# Patient Record
Sex: FEMALE | Race: WHITE | NOT HISPANIC OR LATINO | Employment: OTHER | URBAN - METROPOLITAN AREA
[De-identification: names, ages, dates, MRNs, and addresses within clinical notes are randomized per-mention and may not be internally consistent; named-entity substitution may affect disease eponyms.]

---

## 2017-01-10 ENCOUNTER — HOSPITAL ENCOUNTER (OUTPATIENT)
Dept: RADIOLOGY | Facility: MEDICAL CENTER | Age: 62
End: 2017-01-10
Attending: FAMILY MEDICINE
Payer: COMMERCIAL

## 2017-01-10 DIAGNOSIS — R92.8 ABNORMAL MAMMOGRAM OF LEFT BREAST: ICD-10-CM

## 2017-01-10 PROCEDURE — G0206 DX MAMMO INCL CAD UNI: HCPCS | Mod: LT

## 2017-01-11 ENCOUNTER — OFFICE VISIT (OUTPATIENT)
Dept: OTHER | Facility: IMAGING CENTER | Age: 62
End: 2017-01-11
Payer: COMMERCIAL

## 2017-01-11 DIAGNOSIS — G47.00 INSOMNIA, UNSPECIFIED TYPE: ICD-10-CM

## 2017-01-11 DIAGNOSIS — M54.31 RIGHT SIDED SCIATICA: ICD-10-CM

## 2017-01-11 PROCEDURE — 97811 ACUP 1/> W/O ESTIM EA ADD 15: CPT | Performed by: FAMILY MEDICINE

## 2017-01-11 PROCEDURE — 97813 ACUP 1/> W/ESTIM 1ST 15 MIN: CPT | Performed by: FAMILY MEDICINE

## 2017-01-11 PROCEDURE — 99213 OFFICE O/P EST LOW 20 MIN: CPT | Mod: 25 | Performed by: FAMILY MEDICINE

## 2017-01-11 NOTE — PROGRESS NOTES
Subjective:      Adrianne Martinez is a 60 y.o. female who presents with No chief complaint on file.        [facility] Medical Acupuncture Progress Note  [address] [place] [address]  Dept: [phone]      Patient Name: Adrianne Martinez   MRN: [id number]  YOB: 1955  PCP: Cyrus Mcgowan M.D.  Date of Service: 1/11/2017  9:26 AM    CC Adrianne - Right IT band and hip pain.   Bilateral R>L Cervicalgia (improved)  Insomnia.       HPI L sided breast biopsy is pending for a mass.  She has had a biopsy of the region in the past.  R hip -posterior lateral hip painful    Insomnia.    Has been travelling recently, and this has been tiring to her.   She's seen Dr. Kirk the neurologist recently for her falling / brief LOC at the end of the summer.  No significant diagnoses.           ROS Birthplace: [place], [place], TIme Unknown - ? Born in the early evening per family  Color: Blue  Pain Management - [facility]  Season:Spring and fall for the activities she can do.  Right-handed  Scars: Left breast, L elbow  Has a dog - emotional support dog.  2 dogs.    She had a large weight loss  Doesn't like water  Sees Dr. Monet   [facility] Past Medical History   Diagnosis Date   • Postmenopausal HRT (hormone replacement therapy)    • Insomnia    • Chronic pelvic pain in female    • ENDOMETRIOSIS    • Recurrent UTI    • Family history of colon cancer      Past Surgical History   Procedure Laterality Date   • Abdominal hysterectomy total  1999     for endometriosis   • Lumpectomy  2007     Left - benign   • Other orthopedic surgery  1964     ORIF L elbow compound fx      SH Social History     Social History   • Marital Status:      Spouse Name: N/A   • Number of Children: 2   • Years of Education: N/A     Social History Main Topics   • Smoking status: Never Smoker    • Smokeless tobacco: Never Used   • Alcohol Use: 1.5 oz/week     3 Glasses of wine per week   • Drug Use: No   • Sexual Activity:     Partners: Male      Other Topics Concern   • Not on file     Social History Narrative    Was previously an  - didn't like her job - didn't like conflict.        MEDS Current Outpatient Prescriptions on File Prior to Visit   Medication Sig Dispense Refill   • zolpidem (AMBIEN) 10 MG Tab TAKE 1 TABLET BY MOUTH AT BEDTIME 15 Tab 2   • tizanidine (ZANAFLEX) 4 MG Tab Take 4 mg by mouth at bedtime as needed.     • duloxetine (CYMBALTA) 30 MG Cap DR Particles Take 20 mg by mouth every day.  0   • pregabalin (LYRICA) 50 MG capsule Take 1 Cap by mouth 2 times a day. (Patient taking differently: Take 25 mg by mouth every day.) 90 Cap 11   • hydrocodone-acetaminophen (NORCO) 5-325 MG TABS per tablet Take 1-2 Tabs by mouth every four hours as needed. Indications: Moderate to Moderately Severe Pain     • esterified estrogens-methyltest (ESTRATEST HS) 0.625-1.25 MG TABS Take 1 Tab by mouth every day.       No current facility-administered medications on file prior to visit.      ALLERGIES Allergies   Allergen Reactions   • Contrast Media With Iodine [Iodine]      Malaise, weakness, inability to get up off table, mental status changes   • Cherry Rash   • Oxycontin [Oxycodone Hcl] Vomiting     Able to take vicodin.   • Demerol Vomiting   • Nsaids Rash      PE Tongue and pulse not examined today       Assessment Eastern [facility] - Yang Wood ([place]), ?surrender case  Stagnation Qi to BL/GB, Nasrin sifuentes axis    Western No diagnosis found.               Plan Set 1: RLE Bl59-->+++Bl40 / GB39-->+++GB34 (non-alphastim)  Set 2: Ear Jiménez Men, Yin Parson, GV20, Flori Jiménez [place]  Set 3: Nasrin Sifuentes 3:6  Set 4: LLE SP6-->SP9     15 min face to face time, not including procedure.     David Vieyra MD

## 2017-01-11 NOTE — MR AVS SNAPSHOT
Adrianne Martinez   2017 9:30 AM   Office Visit   MRN: [id number]    Department:  Acupuncture Med   Dept Phone:  [phone]    Description:  Female : 1955   Provider:  David Vieyra M.D.           Allergies as of 2017     Allergen Noted Reactions    Contrast Media With Iodine [Iodine] 2013       Malaise, weakness, inability to get up off table, mental status changes    Cherry 10/29/2013   Rash    Oxycontin [Oxycodone Hcl] 2014   Vomiting    Able to take vicodin.    Demerol 2010   Vomiting    Nsaids 2013   Rash      Vital Signs     Smoking Status                   Never Smoker            Basic Information     Date Of Birth Sex Race Ethnicity Preferred Language    1955 Female  Non- English      Your appointments     2017  1:00 PM   STEREOBIOPSY with [facility] BX 1   [facility] ([address])    [address] [place] [address] [address]  [facility] [id number] [phone]              Problem List              ICD-10-CM Priority Class Noted - Resolved    Postmenopausal HRT (hormone replacement therapy) Z79.890   Unknown - Present    Insomnia G47.00   Unknown - Present    Chronic pelvic pain in female R10.2, G89.29   Unknown - Present    ENDOMETRIOSIS    Unknown - Present    Recurrent UTI N39.0   Unknown - Present    Stress reaction F43.0   2013 - Present    Chronic pain G89.29   2013 - Present    Weight loss R63.4   2013 - Present    Disturbance in sleep behavior G47.9   2013 - Present    Family history of colon cancer Z80.0   Unknown - Present    Right sided sciatica M54.31   2015 - Present    Craving for particular food R63.8   2015 - Present    Irritable bowel syndrome (IBS) K58.9   2016 - Present    Dermatitis L30.9   2016 - Present    Symptomatic menopausal or female climacteric states N95.1   3/8/2016 - Present    Frozen shoulder M75.00   3/8/2016 - Present    Headache syndrome G44.89   2016 -  Present    History of opthalmic migraine Z86.69   7/12/2016 - Present    Spells R68.89   12/14/2016 - Present    Atherosclerosis of right carotid artery, mild I65.21   12/15/2016 - Present      Health Maintenance        Date Due Completion Dates    MAMMOGRAM 1/10/2018 1/10/2017, 12/12/2016, 4/20/2015, 4/9/2014, 3/6/2012, 9/15/2010, 9/14/2009, 9/14/2009, 8/29/2008, 8/29/2008, 12/22/2006, 12/22/2006, 12/22/2006, 12/6/2006, 11/21/2006, 9/8/2005    COLONOSCOPY 12/23/2021 12/23/2016    IMM DTaP/Tdap/Td Vaccine (2 - Td) 10/29/2023 10/29/2013            Current Immunizations     Hep A/HEP B Combined Vaccine (TwinRix) 2/8/2016    Influenza TIV (IM) 10/29/2013    Influenza Vaccine Quad Inj (Pf) 12/21/2015, 11/12/2014    Influenza Vaccine Quad Inj (Preserved) 10/24/2016    SHINGLES VACCINE 7/31/2015    Tdap Vaccine 10/29/2013      Below and/or attached are the medications your provider expects you to take. Review all of your home medications and newly ordered medications with your provider and/or pharmacist. Follow medication instructions as directed by your provider and/or pharmacist. Please keep your medication list with you and share with your provider. Update the information when medications are discontinued, doses are changed, or new medications (including over-the-counter products) are added; and carry medication information at all times in the event of emergency situations     Allergies:  CONTRAST MEDIA WITH IODINE - (reactions not documented)     CHERRY - Rash     OXYCONTIN - Vomiting     DEMEROL - Vomiting     NSAIDS - Rash               Medications  Valid as of: January 11, 2017 - 10:50 AM    Generic Name Brand Name Tablet Size Instructions for use    DULoxetine HCl (Cap DR Particles) CYMBALTA 30 MG Take 20 mg by mouth every day.        Est Estrogens-Methyltest (Tab) ESTRATEST HS 0.625-1.25 MG Take 1 Tab by mouth every day.        Hydrocodone-Acetaminophen (Tab) NORCO 5-325 MG Take 1-2 Tabs by mouth every four hours  as needed. Indications: Moderate to Moderately Severe Pain        Pregabalin (Cap) LYRICA 50 MG Take 1 Cap by mouth 2 times a day.        TiZANidine HCl (Tab) ZANAFLEX 4 MG Take 4 mg by mouth at bedtime as needed.        Zolpidem Tartrate (Tab) AMBIEN 10 MG TAKE 1 TABLET BY MOUTH AT BEDTIME        .                 Medicines prescribed today were sent to:     [facility][id number] - [place], [place] - [address]    [address] [place] [address]    Phone: [phone] Fax: [phone]    Open 24 Hours?: No    [id number] - [place], [place] - [address] AT [address] [facility] [address]    [address] [place] [address]    Phone: [phone] Fax: [phone]    Open 24 Hours?: No      Medication refill instructions:       If your prescription bottle indicates you have medication refills left, it is not necessary to call your provider’s office. Please contact your pharmacy and they will refill your medication.    If your prescription bottle indicates you do not have any refills left, you may request refills at any time through one of the following ways: The online CertiRx system (except Urgent Care), by calling your provider’s office, or by asking your pharmacy to contact your provider’s office with a refill request. Medication refills are processed only during regular business hours and may not be available until the next business day. Your provider may request additional information or to have a follow-up visit with you prior to refilling your medication.   *Please Note: Medication refills are assigned a new Rx number when refilled electronically. Your pharmacy may indicate that no refills were authorized even though a new prescription for the same medication is available at the pharmacy. Please request the medicine by name with the pharmacy before contacting your provider for a refill.        Other Notes About Your Plan     4/14 Labs [facility] scanned    Tdap 2013  Mammogram- 4/14               Colonoscopy-done 2010 (need every 5 years)        Pap-overdue!           MyChart Access Code: Activation code not generated  Current [facility] Status: Active

## 2017-01-11 NOTE — PATIENT INSTRUCTIONS
The side effects of Acupuncture needle insertion include: minor bruising, bleeding, or pain at the site of needle insertion.  If more worrisome symptoms, such as continued bleeding, severe bruising, or continue pain or altered sensation persist, please contact [facility] office @ [phone]

## 2017-01-14 PROBLEM — D12.6 ADENOMATOUS POLYP OF COLON: Status: ACTIVE | Noted: 2017-01-14

## 2017-01-14 PROBLEM — K57.30 DIVERTICULOSIS OF LARGE INTESTINE WITHOUT HEMORRHAGE: Status: ACTIVE | Noted: 2017-01-14

## 2017-01-17 ENCOUNTER — TELEPHONE (OUTPATIENT)
Dept: RADIOLOGY | Facility: MEDICAL CENTER | Age: 62
End: 2017-01-17

## 2017-01-18 ENCOUNTER — HOSPITAL ENCOUNTER (OUTPATIENT)
Dept: RADIOLOGY | Facility: MEDICAL CENTER | Age: 62
End: 2017-01-18
Attending: FAMILY MEDICINE
Payer: COMMERCIAL

## 2017-01-18 DIAGNOSIS — R92.1 BREAST CALCIFICATION, LEFT: ICD-10-CM

## 2017-01-18 PROCEDURE — 88360 TUMOR IMMUNOHISTOCHEM/MANUAL: CPT | Mod: 91

## 2017-01-18 PROCEDURE — 88305 TISSUE EXAM BY PATHOLOGIST: CPT

## 2017-01-18 PROCEDURE — 19081 BX BREAST 1ST LESION STRTCTC: CPT

## 2017-01-18 PROCEDURE — 88342 IMHCHEM/IMCYTCHM 1ST ANTB: CPT

## 2017-01-19 ENCOUNTER — TELEPHONE (OUTPATIENT)
Dept: RADIOLOGY | Facility: MEDICAL CENTER | Age: 62
End: 2017-01-19

## 2017-01-19 DIAGNOSIS — D05.12 DUCTAL CARCINOMA IN SITU (DCIS) OF LEFT BREAST: ICD-10-CM

## 2017-01-19 DIAGNOSIS — C50.912 INFILTRATING DUCTAL CARCINOMA OF LEFT BREAST (HCC): Primary | ICD-10-CM

## 2017-01-19 NOTE — TELEPHONE ENCOUNTER
Adrianne was contacted by phone to receive her biopsy results, then transferred to the radiologist

## 2017-01-30 ENCOUNTER — TELEPHONE (OUTPATIENT)
Dept: RADIATION ONCOLOGY | Facility: MEDICAL CENTER | Age: 62
End: 2017-01-30

## 2017-01-31 ENCOUNTER — TELEPHONE (OUTPATIENT)
Dept: OTHER | Facility: MEDICAL CENTER | Age: 62
End: 2017-01-31

## 2017-01-31 NOTE — TELEPHONE ENCOUNTER
Referral [facility].  FU call to pt re: POC.  Pt is scheduled to have surgery  On 2/8/17.  She is also scheduled to have a consult with a Radiation Oncologist on 1/31/17.    Pt states she has reliable transportation.  Her  will accompany her to surgery and will be available for support post op.  Pt is retired.  Pt had to hang up before contact information could be provided.

## 2017-02-02 ENCOUNTER — HOSPITAL ENCOUNTER (OUTPATIENT)
Dept: RADIATION ONCOLOGY | Facility: MEDICAL CENTER | Age: 62
End: 2017-02-28
Attending: RADIOLOGY
Payer: COMMERCIAL

## 2017-02-02 VITALS
WEIGHT: 107.5 LBS | SYSTOLIC BLOOD PRESSURE: 113 MMHG | DIASTOLIC BLOOD PRESSURE: 56 MMHG | TEMPERATURE: 97.2 F | OXYGEN SATURATION: 97 % | HEIGHT: 62 IN | RESPIRATION RATE: 15 BRPM | BODY MASS INDEX: 19.78 KG/M2

## 2017-02-02 PROCEDURE — 99205 OFFICE O/P NEW HI 60 MIN: CPT | Performed by: RADIOLOGY

## 2017-02-02 PROCEDURE — 99214 OFFICE O/P EST MOD 30 MIN: CPT | Performed by: RADIOLOGY

## 2017-02-02 RX ORDER — CHLORAL HYDRATE 500 MG
1000 CAPSULE ORAL EVERY MORNING
COMMUNITY

## 2017-02-02 RX ORDER — ANTIARTHRITIC COMBINATION NO.2 900 MG
25 TABLET ORAL DAILY
COMMUNITY
End: 2018-02-01

## 2017-02-02 RX ORDER — UBIDECARENONE 75 MG
100 CAPSULE ORAL DAILY
COMMUNITY
End: 2017-05-30

## 2017-02-02 NOTE — PROGRESS NOTES
RADIATION ONCOLOGY CONSULT    DATE OF SERVICE: 2017    IDENTIFICATION: A 61 y.o. female with grade 1, invasive ductal carcinoma, left breast, receptor positive, HER-2 negative, status post stereo core biopsy.  She is here at the kind request of Dr. Baird to discuss adjuvant radiotherapy.      HISTORY OF PRESENT ILLNESS: Underwent routine screening mammography January 10, 2017 that demonstrated new cluster of microcalcifications left breast central aspect. Patient completely asymptomatic. She's had a prior breast biopsy on the left in . Stereo core biopsy of the breast was performed on 2017. Pathology demonstrated grade 1 invasive ductal carcinoma less than 2 mm in size. Receptor positive, Ki-67 less than 10%, and HER-2 negative. She is scheduled for partial mastectomy . She seen Dr. Dutta in medical oncology consultation.    PAST MEDICAL HISTORY:   Past Medical History   Diagnosis Date   • Postmenopausal HRT (hormone replacement therapy)    • Insomnia    • Chronic pelvic pain in female    • ENDOMETRIOSIS    • Recurrent UTI    • Family history of colon cancer    • Breast cancer (CMS-HCC)      left   • Endometriosis    • Colon polyp        PAST SURGICAL HISTORY:  Past Surgical History   Procedure Laterality Date   • Abdominal hysterectomy total       for endometriosis   • Lumpectomy       Left - benign   • Other orthopedic surgery       ORIF L elbow compound fx   • Breast biopsy       left breast   • Breast biopsy       left breast biopsy  (benign)   • Ovarian cystectomy     • Laparoscopy       multiple for endometriosis       PAIN SCALE: 0-10  Pain Assessement: Initial  Pain Location, Orientation and Scale: Hip: Right : Chronic  : 4      GYNECOLOGICAL STATUS:  : 2, Para: 2, Number of Interrupted Pregnancies: 0 and Age at first birth: 32    HORMONE USE:  Contraceptive hormone use 3 years and Post-menopause use 17 years    ECOG PERFORMANCE STATUS:  0= Fully active,  able to carry on all pre-disease performance without restriction.    CURRENT MEDICATIONS:  Current Outpatient Prescriptions   Medication Sig Dispense Refill   • Omega-3 Fatty Acids (FISH OIL) 1000 MG Cap capsule Take 1,000 mg by mouth 3 times a day, with meals.     • CALCIUM & MAGNESIUM CARBONATES PO Take  by mouth.     • Cholecalciferol (VITAMIN D-3 PO) Take  by mouth.     • DHEA 25 MG Tab Take 25 mg by mouth every day.     • cyanocobalamin (VITAMIN B-12) 100 MCG Tab Take 100 mcg by mouth every day.     • Multiple Vitamins-Minerals (WOMENS MULTI PO) Take  by mouth.     • Probiotic Product (PROBIOTIC DAILY PO) Take  by mouth.     • CO ENZYME Q-10 PO Take  by mouth.     • non-formulary med Indications: phystisone     • RESVERATROL PO Take  by mouth.     • zolpidem (AMBIEN) 10 MG Tab TAKE 1 TABLET BY MOUTH AT BEDTIME 15 Tab 2   • tizanidine (ZANAFLEX) 4 MG Tab Take 4 mg by mouth at bedtime as needed.     • duloxetine (CYMBALTA) 30 MG Cap DR Particles Take 20 mg by mouth every day.  0   • pregabalin (LYRICA) 50 MG capsule Take 1 Cap by mouth 2 times a day. (Patient taking differently: Take 25 mg by mouth every day.) 90 Cap 11   • hydrocodone-acetaminophen (NORCO) 5-325 MG TABS per tablet Take 1-2 Tabs by mouth every four hours as needed. Indications: Moderate to Moderately Severe Pain       No current facility-administered medications for this encounter.       ALLERGIES:    Contrast media with iodine; Cherry; Oxycontin; Demerol; and Nsaids    FAMILY HISTORY:    family history includes Cancer in her father and mother; Heart Attack (age of onset: 50) in her brother. Mother diagnosed with breast cancer at 79. Father  at 82 had a history of colon cancer, skin cancer and head and neck cancer. He was a smoker.    SOCIAL HISTORY:     reports that she has never smoked. She has never used smokeless tobacco. She reports that she drinks about 0.6 oz of alcohol per week. She reports that she does not use illicit  "drugs.    SOCIOECONOMIC HISTORY:   lives with  [employer] in [place]. Retired     REVIEW OF SYSTEMS:  Review of systems for today's date of service was reviewed in the electronic medical record.  Positives to follow:  ROS: Constitutional ROS: Positive for fatigue , sweats   Neck ROS: Positive for pain with motion   Musculoskeletal/Extremities ROS: Positive for pain in joints, muscle aches  Skin/Integumentary ROS: Positive for itching  Neurologic ROS: Positive for headaches, seizures is being followed by Dr. Kirk  Psychiatric ROS: Positive for depression    PHYSICAL EXAM:    /56 mmHg  Temp(Src) 36.2 °C (97.2 °F)  Resp 15  Ht 1.575 m (5' 2\")  Wt 48.762 kg (107 lb 8 oz)  BMI 19.66 kg/m2  SpO2 97%  GENERAL: Alert oriented female appearing stated age in no acute distress.  HEENT:  Pupils are equal, round, and reactive to light.  Extraocular muscles   are intact. Sclerae nonicteric.  Conjunctivae pink.  Oral cavity, tongue   protrudes midline.   NECK:  Supple without evidence of thyromegaly.  NODES:  No peripheral adenopathy of the neck, supraclavicular fossa or axillae   bilaterally.  LUNGS:  Clear to ascultation and resonant to percussion.  HEART:  Regular rate and rhythm.  No murmur appreciated  BREAST: Right breast without palpable abnormality, left breast ecchymosis upper aspect. Small stereo core site noted centrally. No palpable abnormality.  ABDOMEN:  Soft. No evidence of hepatosplenomegaly.  Positive bowel sounds.  EXTREMITIES:  Without Edema.  NEUROLOGIC:  Cranial nerves II through XII were intact.  Strength is 5/5 in   lower extremities bilaterally.  DTRs were symmetrical.  There was no focal   sensory deficit appreciated.    LABORATORY DATA:   Lab Results   Component Value Date/Time    SODIUM 135 10/26/2016 08:00 AM    POTASSIUM 3.8 10/26/2016 08:00 AM    CHLORIDE 100 10/26/2016 08:00 AM    CO2 29 10/26/2016 08:00 AM    GLUCOSE 76 10/26/2016 08:00 AM    BUN 15 10/26/2016 " 08:00 AM    CREATININE 0.60 10/26/2016 08:00 AM    BUN-CREATININE RATIO 26* 04/14/2014 03:00 PM     Lab Results   Component Value Date/Time    ALKALINE PHOSPHATASE 42 10/26/2016 08:00 AM    AST(SGOT) 18 10/26/2016 08:00 AM    ALT(SGPT) 13 10/26/2016 08:00 AM    TOTAL BILIRUBIN 0.6 10/26/2016 08:00 AM      Lab Results   Component Value Date/Time    WBC 4.6* 10/26/2016 08:00 AM    RBC 4.40 10/26/2016 08:00 AM    HEMOGLOBIN 13.4 10/26/2016 08:00 AM    HEMATOCRIT 41.7 10/26/2016 08:00 AM    MCV 94.8 10/26/2016 08:00 AM    MCH 30.5 10/26/2016 08:00 AM    MCHC 32.1* 10/26/2016 08:00 AM    MPV 9.8 10/26/2016 08:00 AM    NEUTROPHILS-POLYS 52.10 10/26/2016 08:00 AM    LYMPHOCYTES 32.70 10/26/2016 08:00 AM    MONOCYTES 7.80 10/26/2016 08:00 AM    EOSINOPHILS 6.10 10/26/2016 08:00 AM    BASOPHILS 1.10 10/26/2016 08:00 AM        RADIOLOGY DATA:  MA DIAGNOSTIC DIGITAL MAMMO-UNILAT LEFT  1/10/2017  New indeterminate appearing microcalcifications central left breast. Biopsy is recommended. The calcifications are minimal to stereotactic guided vacuum-assisted percutaneous biopsy. The patient has been given an appointment for stereo guided biopsy procedure. These results were given to the patient at the time of visit. These results were discussed with  by telephone at 1450 hours on 1/10/2017. R4 - Category 4:  Suspicious Abnormality - Biopsy Should Be Considered    SB STEREOTACTIC BIOPSY  1/21/2017  Addendum dictated on 1/19/2017 by Dr. Clark as follows: Addendum: Pathology demonstrates invasive ductal carcinoma. I discussed these findings with the patient. She plans to contact her referring clinician. There are no symptoms referrable to the chest wall.    1/21/2017  Stereotactic guided vacuum assisted needle biopsy of microcalcifications from the central portion of the LEFT breast.      IMPRESSION:  A 61 y.o. with early stage breast cancer status post stereo core biopsy.    RECOMMENDATIONS: Patient is desiring partial  mastectomy which is reasonable considering what appears to be very early stage low-grade breast cancer. We discussed adjuvant radiotherapy post partial mastectomy. Radiotherapy can be given either externally or 3 week course or internally over 1 week given in a twice a day manner. Patient is wanting the internal accelerated partial breast brachytherapy. We discussed the pathologic constraints that need to be met including: Clear margins, and no evidence of lymphadenopathy. After detailed technical aspects benefits risks she would like to proceed with accelerated partial breast irradiation if candidate. Patient is also aware that if she does not meet pathologic constraints that she'll receive external beam therapy adjuvantly.    Spoke with Dr. Baird. She is scheduled to have her partial mastectomy on February 8, a pre-implant scan of the breast will be performed on February 14, implant will be placed on February 15, she'll return on February 16 for planning, and treatment will start on the 17th completed by the 24th.    One hour was spent face-to-face with patient in the office and more than half of that time was spent counseling patient or coordinating care as described above.    Thank you for the opportunity to participate in her care.  If any questions or comments, please do not hesitate in calling.    Shannon PILLAI M.D.  Electronically signed by: Shannon Rincon V, 2/2/2017 10:14 AM  [phone]

## 2017-02-03 DIAGNOSIS — Z01.812 PRE-OPERATIVE LABORATORY EXAMINATION: ICD-10-CM

## 2017-02-03 LAB
ERYTHROCYTE [DISTWIDTH] IN BLOOD BY AUTOMATED COUNT: 41 FL (ref 35.9–50)
HCT VFR BLD AUTO: 42.4 % (ref 37–47)
HGB BLD-MCNC: 13.8 G/DL (ref 12–16)
MCH RBC QN AUTO: 29.9 PG (ref 27–33)
MCHC RBC AUTO-ENTMCNC: 32.5 G/DL (ref 33.6–35)
MCV RBC AUTO: 91.8 FL (ref 81.4–97.8)
PLATELET # BLD AUTO: 268 K/UL (ref 164–446)
PMV BLD AUTO: 9.7 FL (ref 9–12.9)
RBC # BLD AUTO: 4.62 M/UL (ref 4.2–5.4)
WBC # BLD AUTO: 3.9 K/UL (ref 4.8–10.8)

## 2017-02-03 PROCEDURE — 36415 COLL VENOUS BLD VENIPUNCTURE: CPT

## 2017-02-03 PROCEDURE — 85027 COMPLETE CBC AUTOMATED: CPT

## 2017-02-08 ENCOUNTER — APPOINTMENT (OUTPATIENT)
Dept: RADIOLOGY | Facility: MEDICAL CENTER | Age: 62
End: 2017-02-08
Attending: SURGERY
Payer: COMMERCIAL

## 2017-02-08 ENCOUNTER — HOSPITAL ENCOUNTER (OUTPATIENT)
Facility: MEDICAL CENTER | Age: 62
End: 2017-02-08
Attending: SURGERY | Admitting: SURGERY
Payer: COMMERCIAL

## 2017-02-08 ENCOUNTER — HOSPITAL ENCOUNTER (EMERGENCY)
Facility: MEDICAL CENTER | Age: 62
End: 2017-02-08
Payer: COMMERCIAL

## 2017-02-08 VITALS
DIASTOLIC BLOOD PRESSURE: 67 MMHG | OXYGEN SATURATION: 96 % | RESPIRATION RATE: 14 BRPM | SYSTOLIC BLOOD PRESSURE: 92 MMHG | WEIGHT: 110.89 LBS | BODY MASS INDEX: 20.41 KG/M2 | HEART RATE: 88 BPM | HEIGHT: 62 IN | TEMPERATURE: 97.2 F

## 2017-02-08 VITALS
RESPIRATION RATE: 16 BRPM | WEIGHT: 108.69 LBS | HEIGHT: 62 IN | SYSTOLIC BLOOD PRESSURE: 103 MMHG | BODY MASS INDEX: 20 KG/M2 | OXYGEN SATURATION: 94 % | TEMPERATURE: 97.1 F | DIASTOLIC BLOOD PRESSURE: 62 MMHG | HEART RATE: 67 BPM

## 2017-02-08 DIAGNOSIS — C50.112 MALIGNANT NEOPLASM OF CENTRAL PORTION OF LEFT FEMALE BREAST (HCC): ICD-10-CM

## 2017-02-08 PROCEDURE — A4606 OXYGEN PROBE USED W OXIMETER: HCPCS | Performed by: SURGERY

## 2017-02-08 PROCEDURE — 160048 HCHG OR STATISTICAL LEVEL 1-5: Performed by: SURGERY

## 2017-02-08 PROCEDURE — 502240 HCHG MISC OR SUPPLY RC 0272: Performed by: SURGERY

## 2017-02-08 PROCEDURE — 160036 HCHG PACU - EA ADDL 30 MINS PHASE I: Performed by: SURGERY

## 2017-02-08 PROCEDURE — 160029 HCHG SURGERY MINUTES - 1ST 30 MINS LEVEL 4: Performed by: SURGERY

## 2017-02-08 PROCEDURE — 160041 HCHG SURGERY MINUTES - EA ADDL 1 MIN LEVEL 4: Performed by: SURGERY

## 2017-02-08 PROCEDURE — 76098 X-RAY EXAM SURGICAL SPECIMEN: CPT | Mod: LT

## 2017-02-08 PROCEDURE — 110382 HCHG SHELL REV 271: Performed by: SURGERY

## 2017-02-08 PROCEDURE — 19281 PERQ DEVICE BREAST 1ST IMAG: CPT | Mod: LT

## 2017-02-08 PROCEDURE — 500887 HCHG PACK, MAJOR BASIN: Performed by: SURGERY

## 2017-02-08 PROCEDURE — 88307 TISSUE EXAM BY PATHOLOGIST: CPT

## 2017-02-08 PROCEDURE — 501838 HCHG SUTURE GENERAL: Performed by: SURGERY

## 2017-02-08 PROCEDURE — 160047 HCHG PACU  - EA ADDL 30 MINS PHASE II: Performed by: SURGERY

## 2017-02-08 PROCEDURE — 38792 RA TRACER ID OF SENTINL NODE: CPT | Mod: LT

## 2017-02-08 PROCEDURE — 500700 HCHG HEMOCLIP, SMALL (RED): Performed by: SURGERY

## 2017-02-08 PROCEDURE — 700101 HCHG RX REV CODE 250

## 2017-02-08 PROCEDURE — 160002 HCHG RECOVERY MINUTES (STAT): Performed by: SURGERY

## 2017-02-08 PROCEDURE — 160046 HCHG PACU - 1ST 60 MINS PHASE II: Performed by: SURGERY

## 2017-02-08 PROCEDURE — 700111 HCHG RX REV CODE 636 W/ 250 OVERRIDE (IP)

## 2017-02-08 PROCEDURE — 110371 HCHG SHELL REV 272: Performed by: SURGERY

## 2017-02-08 PROCEDURE — 500368 HCHG DRAIN, 7MM FLAT-FLUTED: Performed by: SURGERY

## 2017-02-08 PROCEDURE — 160009 HCHG ANES TIME/MIN: Performed by: SURGERY

## 2017-02-08 PROCEDURE — 700111 HCHG RX REV CODE 636 W/ 250 OVERRIDE (IP): Performed by: SURGERY

## 2017-02-08 PROCEDURE — 160035 HCHG PACU - 1ST 60 MINS PHASE I: Performed by: SURGERY

## 2017-02-08 PROCEDURE — 160025 RECOVERY II MINUTES (STATS): Performed by: SURGERY

## 2017-02-08 PROCEDURE — 302449 STATCHG TRIAGE ONLY (STATISTIC)

## 2017-02-08 PROCEDURE — 500389 HCHG DRAIN, RESERVOIR SUCT JP 100CC: Performed by: SURGERY

## 2017-02-08 PROCEDURE — 500698 HCHG HEMOCLIP, MEDIUM: Performed by: SURGERY

## 2017-02-08 RX ORDER — HYDROCODONE BITARTRATE AND ACETAMINOPHEN 5; 325 MG/1; MG/1
1-2 TABLET ORAL EVERY 4 HOURS PRN
Status: DISCONTINUED | OUTPATIENT
Start: 2017-02-08 | End: 2017-02-08 | Stop reason: HOSPADM

## 2017-02-08 RX ORDER — HYDROCODONE BITARTRATE AND ACETAMINOPHEN 5; 325 MG/1; MG/1
1-2 TABLET ORAL EVERY 4 HOURS PRN
Qty: 30 TAB | Refills: 0 | Status: SHIPPED | OUTPATIENT
Start: 2017-02-08 | End: 2017-02-15

## 2017-02-08 RX ORDER — BUPIVACAINE HYDROCHLORIDE 5 MG/ML
INJECTION, SOLUTION EPIDURAL; INTRACAUDAL
Status: DISCONTINUED | OUTPATIENT
Start: 2017-02-08 | End: 2017-02-08 | Stop reason: HOSPADM

## 2017-02-08 RX ORDER — ZOLPIDEM TARTRATE 10 MG/1
5-10 TABLET ORAL NIGHTLY PRN
COMMUNITY
End: 2017-03-06 | Stop reason: SDUPTHER

## 2017-02-08 RX ORDER — ISOSULFAN BLUE 50 MG/5ML
5 INJECTION, SOLUTION SUBCUTANEOUS ONCE
Status: DISCONTINUED | OUTPATIENT
Start: 2017-02-08 | End: 2017-02-08 | Stop reason: HOSPADM

## 2017-02-08 RX ORDER — DULOXETIN HYDROCHLORIDE 20 MG/1
20 CAPSULE, DELAYED RELEASE ORAL DAILY
COMMUNITY
End: 2017-05-30

## 2017-02-08 RX ORDER — ISOSULFAN BLUE 50 MG/5ML
INJECTION, SOLUTION SUBCUTANEOUS
Status: DISCONTINUED | OUTPATIENT
Start: 2017-02-08 | End: 2017-02-08 | Stop reason: HOSPADM

## 2017-02-08 RX ORDER — SODIUM CHLORIDE, SODIUM LACTATE, POTASSIUM CHLORIDE, CALCIUM CHLORIDE 600; 310; 30; 20 MG/100ML; MG/100ML; MG/100ML; MG/100ML
1000 INJECTION, SOLUTION INTRAVENOUS
Status: COMPLETED | OUTPATIENT
Start: 2017-02-08 | End: 2017-02-08

## 2017-02-08 RX ORDER — MORPHINE SULFATE 4 MG/ML
1-4 INJECTION, SOLUTION INTRAMUSCULAR; INTRAVENOUS
Status: DISCONTINUED | OUTPATIENT
Start: 2017-02-08 | End: 2017-02-08 | Stop reason: HOSPADM

## 2017-02-08 RX ORDER — PREGABALIN 25 MG/1
25 CAPSULE ORAL DAILY
COMMUNITY
End: 2017-05-30

## 2017-02-08 RX ORDER — LIDOCAINE HYDROCHLORIDE 10 MG/ML
INJECTION, SOLUTION INFILTRATION; PERINEURAL
Status: COMPLETED
Start: 2017-02-08 | End: 2017-02-08

## 2017-02-08 RX ORDER — LIDOCAINE AND PRILOCAINE 25; 25 MG/G; MG/G
CREAM TOPICAL
Status: COMPLETED
Start: 2017-02-08 | End: 2017-02-08

## 2017-02-08 RX ORDER — ONDANSETRON 2 MG/ML
2-4 INJECTION INTRAMUSCULAR; INTRAVENOUS
Status: DISCONTINUED | OUTPATIENT
Start: 2017-02-08 | End: 2017-02-08 | Stop reason: HOSPADM

## 2017-02-08 RX ADMIN — LIDOCAINE HYDROCHLORIDE: 10 INJECTION, SOLUTION INFILTRATION; PERINEURAL at 08:00

## 2017-02-08 RX ADMIN — SODIUM CHLORIDE, SODIUM LACTATE, POTASSIUM CHLORIDE, CALCIUM CHLORIDE 1000 ML: 600; 310; 30; 20 INJECTION, SOLUTION INTRAVENOUS at 08:26

## 2017-02-08 RX ADMIN — LIDOCAINE AND PRILOCAINE 2.5 %: 25; 25 CREAM TOPICAL at 08:00

## 2017-02-08 ASSESSMENT — PAIN SCALES - GENERAL
PAINLEVEL_OUTOF10: 4
PAINLEVEL_OUTOF10: 3
PAINLEVEL_OUTOF10: 3
PAINLEVEL_OUTOF10: 4
PAINLEVEL_OUTOF10: 4

## 2017-02-08 ASSESSMENT — PAIN SCALES - WONG BAKER: WONGBAKER_NUMERICALRESPONSE: DOESN'T HURT AT ALL

## 2017-02-08 NOTE — IP AVS SNAPSHOT
" Home Care Instructions                                                                                                                Name:Adrianne Martinez  Medical Record Number:[id number]  CSN: [id number]    YOB: 1955   Age: 61 y.o.  Sex: female  HT:1.575 m (5' 2\") WT: 49.3 kg (108 lb 11 oz)          Admit Date: 2/8/2017     Discharge Date:   Today's Date: 2/8/2017  Attending Doctor:  Adriano Baird M.D.                  Allergies:  Contrast media with iodine; Cherry; Oxycontin; Demerol; Nsaids; and Tape                Discharge Instructions         ACTIVITY: Rest and take it easy for the first 24 hours.  A responsible adult is recommended to remain with you during that time.  It is normal to feel sleepy.  We encourage you to not do anything that requires balance, judgment or coordination.    MILD FLU-LIKE SYMPTOMS ARE NORMAL. YOU MAY EXPERIENCE GENERALIZED MUSCLE ACHES, THROAT IRRITATION, HEADACHE AND/OR SOME NAUSEA.    FOR 24 HOURS DO NOT:  Drive, operate machinery or run household appliances.  Drink beer or alcoholic beverages.   Make important decisions or sign legal documents.    SPECIAL INSTRUCTIONS: *call tomorrow for follow up appointment.   May shower beginning Friday.**  Bulb Drain Home Care  A bulb drain consists of a thin rubber tube and a soft, round bulb that creates a gentle suction. The rubber tube is placed in the area where you had surgery. A bulb is attached to the end of the tube that is outside the body. The bulb drain removes excess fluid that normally builds up in a surgical wound after surgery. The color and amount of fluid will vary. Immediately after surgery, the fluid is bright red and is a little thicker than water. It may gradually change to a yellow or pink color and become more thin and water-like. When the amount decreases to about 1 or 2 tbsp in 24 hours, your health care provider will usually remove it.  DAILY CARE  · Keep the bulb flat (compressed) at all times, except " while emptying it. The flatness creates suction. You can flatten the bulb by squeezing it firmly in the middle and then closing the cap.  · Keep sites where the tube enters the skin dry and covered with a bandage (dressing).  · Secure the tube 1-2 in (2.5-5.1 cm) below the insertion sites to keep it from pulling on your stitches. The tube is stitched in place and will not slip out.  · Secure the bulb as directed by your health care provider.  · For the first 3 days after surgery, there usually is more fluid in the bulb. Empty the bulb whenever it becomes half full because the bulb does not create enough suction if it is too full. The bulb could also overflow. Write down how much fluid you remove each time you empty your drain. Add up the amount removed in 24 hours.  · Empty the bulb at the same time every day once the amount of fluid decreases and you only need to empty it once a day. Write down the amounts and the 24-hour totals to give to your health care provider. This helps your health care provider know when the tubes can be removed.  EMPTYING THE BULB DRAIN  Before emptying the bulb, get a measuring cup, a piece of paper and a pen, and wash your hands.  · Gently run your fingers down the tube (stripping) to empty any drainage from the tubing into the bulb. This may need to be done several times a day to clear the tubing of clots and tissue.  · Open the bulb cap to release suction, which causes it to inflate. Do not touch the inside of the cap.  · Gently run your fingers down the tube (stripping) to empty any drainage from the tubing into the bulb.  · Hold the cap out of the way, and pour fluid into the measuring cup.    · Squeeze the bulb to provide suction.   · Replace the cap.    · Check the tape that holds the tube to your skin. If it is becoming loose, you can remove the loose piece of tape and apply a new one. Then, pin the bulb to your shirt.    · Write down the amount of fluid you emptied out. Write  down the date and each time you emptied your bulb drain. (If there are 2 bulbs, note the amount of drainage from each bulb and keep the totals separate. Your health care provider will want to know the total amounts for each drain and which tube is draining more.)    · Flush the fluid down the toilet and wash your hands.    · Call your health care provider once you have less than 2 tbsp of fluid collecting in the bulb drain every 24 hours.  If there is drainage around the tube site, change dressings and keep the area dry. Cleanse around tube with sterile saline and place dry gauze around site. This gauze should be changed when it is soiled. If it stays clean and unsoiled, it should still be changed daily.   SEEK MEDICAL CARE IF:  · Your drainage has a bad smell or is cloudy.    · You have a fever.    · Your drainage is increasing instead of decreasing.    · Your tube fell out.    · You have redness or swelling around the tube site.    · You have drainage from a surgical wound.    · Your bulb drain will not stay flat after you empty it.      DIET: To avoid nausea, slowly advance diet as tolerated, avoiding spicy or greasy foods for the first day.  Add more substantial food to your diet according to your physician's instructions. INCREASE FLUIDS AND FIBER TO AVOID CONSTIPATION.    SURGICAL DRESSING/BATHING: **no tub baths or soaking of incision area until surgeon gives approval.*    FOLLOW-UP APPOINTMENT:  A follow-up appointment should be arranged with your doctor.  Call to schedule.    You should CALL YOUR PHYSICIAN if you develop:  Fever greater than 101 degrees F.  Pain not relieved by medication, or persistent nausea or vomiting.  Excessive bleeding (blood soaking through dressing) or unexpected drainage from the wound.  Extreme redness or swelling around the incision site, drainage of pus or foul smelling drainage.  Inability to urinate or empty your bladder within 8 hours.  Problems with breathing or chest  pain.    You should call 911 if you develop problems with breathing or chest pain.  If you are unable to contact your doctor or surgical center, you should go to the nearest emergency room or urgent care center.  Physician's telephone #: **Dr. Baird and Dr. Logan*[phone]    If any questions arise, call your doctor.  If your doctor is not available, please feel free to call the Surgical Center at [phone].  The Center is open Monday through Friday from 7AM to 7PM.  You can also call the [facility] HOTLINE open 24 hours/day, 7 days/week and speak to a nurse at [phone], or toll free at [phone].    A registered nurse may call you a few days after your surgery to see how you are doing after your procedure.    MEDICATIONS: Resume taking daily medication.  Take prescribed pain medication with food.  If no medication is prescribed, you may take non-aspirin pain medication if needed.  PAIN MEDICATION CAN BE VERY CONSTIPATING.  Take a stool softener or laxative such as senokot, pericolace, or milk of magnesia if needed.    Prescription given for Norco. No oral pain medication given in recovery.    If your physician has prescribed pain medication that includes Acetaminophen (Tylenol), do not take additional Acetaminophen (Tylenol) while taking the prescribed medication.    Depression / Suicide Risk    As you are discharged from this [facility] facility, it is important to learn how to keep safe from harming yourself.    Recognize the warning signs:  · Abrupt changes in personality, positive or negative- including increase in energy   · Giving away possessions  · Change in eating patterns- significant weight changes-  positive or negative  · Change in sleeping patterns- unable to sleep or sleeping all the time   · Unwillingness or inability to communicate  · Depression  · Unusual sadness, discouragement and loneliness  · Talk of wanting to die  · Neglect of personal appearance   · Rebelliousness-  reckless behavior  · Withdrawal from people/activities they love  · Confusion- inability to concentrate     If you or a loved one observes any of these behaviors or has concerns about self-harm, here's what you can do:  · Talk about it- your feelings and reasons for harming yourself  · Remove any means that you might use to hurt yourself (examples: pills, rope, extension cords, firearm)  · Get professional help from the community (Mental Health, Substance Abuse, psychological counseling)  · Do not be alone:Call your Safe Contact- someone whom you trust who will be there for you.  · Call your local CRISIS HOTLINE 201-8928 or 299-844-3761  · Call your local Children's Mobile Crisis Response Team [place] (244) 893-6866 or www.[employer]  · Call the toll free National Suicide Prevention Hotlines   · National Suicide Prevention Lifeline 569-622-KVAP (4687)  · Wiper Line Network 800-MXKWNSZ (661-7094)       Medication List      CHANGE how you take these medications        Instructions    * hydrocodone-acetaminophen 5-325 MG Tabs per tablet   What changed:  You were already taking a medication with the same name, and this prescription was added. Make sure you understand how and when to take each.   Commonly known as:  NORCO    Take 1-2 Tabs by mouth every four hours as needed.   Dose:  1-2 Tab       * hydrocodone-acetaminophen 5-325 MG Tabs per tablet   What changed:  Another medication with the same name was added. Make sure you understand how and when to take each.   Commonly known as:  NORCO    Take 1-2 Tabs by mouth every four hours as needed. Indications: Moderate to Moderately Severe Pain   Dose:  1-2 Tab       * Notice:  This list has 2 medication(s) that are the same as other medications prescribed for you. Read the directions carefully, and ask your doctor or other care provider to review them with you.      CONTINUE taking these medications        Instructions    AMBIEN 10 MG Tabs   Generic  drug:  zolpidem    Take 5-10 mg by mouth at bedtime as needed for Sleep.   Dose:  5-10 mg       CALCIUM & MAGNESIUM CARBONATES PO    Take 1 Tab by mouth every day.   Dose:  1 Tab       cyanocobalamin 100 MCG Tabs   Commonly known as:  VITAMIN B-12    Take 100 mcg by mouth every day.   Dose:  100 mcg       DHEA 25 MG Tabs    Take 25 mg by mouth every day.   Dose:  25 mg       duloxetine 20 MG Cpep   Commonly known as:  CYMBALTA    Take 20 mg by mouth every day.   Dose:  20 mg       fish oil 1000 MG Caps capsule    Take 1,000 mg by mouth 3 times a day, with meals.   Dose:  1000 mg       pregabalin 25 MG Caps   Commonly known as:  LYRICA    Take 25 mg by mouth every day.   Dose:  25 mg       tizanidine 4 MG Tabs   Commonly known as:  ZANAFLEX    Take 4 mg by mouth at bedtime as needed.   Dose:  4 mg       VITAMIN D-3 PO    Take 1 Tab by mouth every day.   Dose:  1 Tab               Medication Information     Above and/or attached are the medications your physician expects you to take upon discharge. Review all of your home medications and newly ordered medications with your doctor and/or pharmacist. Follow medication instructions as directed by your doctor and/or pharmacist. Please keep your medication list with you and share with your physician. Update the information when medications are discontinued, doses are changed, or new medications (including over-the-counter products) are added; and carry medication information at all times in the event of emergency situations.        Resources     Quit Smoking / Tobacco Use:    I understand the use of any tobacco products increases my chance of suffering from future heart disease or stroke and could cause other illnesses which may shorten my life. Quitting the use of tobacco products is the single most important thing I can do to improve my health. For further information on smoking / tobacco cessation call a Toll Free Quit Line at 1-155.360.9862 (*[employer] [place])  or 1-285.605.7189 (American Lung Association) or you can access the web based program at www.lungusa.org.    [place] Tobacco Users Help Line:  (443) 732-9707       Toll Free: 1-656.619.8030  Quit Tobacco Program [facility] (727)427-3107    Crisis Hotline:    [place] Crisis Hotline:  4-667-RIBBILH or 1-232.953.1835    [place] Crisis Hotline:    1-969.371.6941 or 222-711-3962    Discharge Survey:   Thank you for choosing [facility]. We hope we did everything we could to make your hospital stay a pleasant one. You may be receiving a survey and we would appreciate your time and participation in answering the questions. Your input is very valuable to us in our efforts to improve our service to our patients and their families.            Signatures     My signature on this form indicates that:    1. I acknowledge receipt and understanding of these Home Care Instruction.  2. My questions regarding this information have been answered to my satisfaction.  3. I have formulated a plan with my discharge nurse to obtain my prescribed medications for home.    __________________________________      __________________________________                   Patient Signature                                 Guardian/Responsible Adult Signature      __________________________________                 __________       ________                       Nurse Signature                                               Date                 Time

## 2017-02-08 NOTE — OR SURGEON
Immediate Post-Operative Note      PreOp Diagnosis: left breast cancer    PostOp Diagnosis: left breast cancer    Procedure(s):  MASTECTOMY - PARTIAL PLACEMENT OF CAVITY EVALUATION DEVICE - Wound Class: Clean with Drain  NODE BIOPSY SENTINEL - Wound Class: Clean with Drain    Surgeon(s):  FABIAN Woodson M.D.    Anesthesiologist/Type of Anesthesia:  Anesthesiologist: Amandeep Daugherty M.D./General    Surgical Staff:  Circulator: Huma Coles R.N.; Debra Estrada R.N.  Relief Circulator: Kristen Resendez R.N.  Scrub Person: Abdias Barraza R.N.; Crispin Harrison [place]: Quiana Hernandez R.N.    Specimen: left partial mastectomy and left sentinel node    Estimated Blood Loss: 50 cc    Findings: marker in specimen    Complications: none        2/8/2017 12:50 PM Adriano Baird

## 2017-02-08 NOTE — DISCHARGE INSTRUCTIONS
ACTIVITY: Rest and take it easy for the first 24 hours.  A responsible adult is recommended to remain with you during that time.  It is normal to feel sleepy.  We encourage you to not do anything that requires balance, judgment or coordination.    MILD FLU-LIKE SYMPTOMS ARE NORMAL. YOU MAY EXPERIENCE GENERALIZED MUSCLE ACHES, THROAT IRRITATION, HEADACHE AND/OR SOME NAUSEA.    FOR 24 HOURS DO NOT:  Drive, operate machinery or run household appliances.  Drink beer or alcoholic beverages.   Make important decisions or sign legal documents.    SPECIAL INSTRUCTIONS: *call tomorrow for follow up appointment.   May shower beginning Friday.**  Bulb Drain Home Care  A bulb drain consists of a thin rubber tube and a soft, round bulb that creates a gentle suction. The rubber tube is placed in the area where you had surgery. A bulb is attached to the end of the tube that is outside the body. The bulb drain removes excess fluid that normally builds up in a surgical wound after surgery. The color and amount of fluid will vary. Immediately after surgery, the fluid is bright red and is a little thicker than water. It may gradually change to a yellow or pink color and become more thin and water-like. When the amount decreases to about 1 or 2 tbsp in 24 hours, your health care provider will usually remove it.  DAILY CARE  · Keep the bulb flat (compressed) at all times, except while emptying it. The flatness creates suction. You can flatten the bulb by squeezing it firmly in the middle and then closing the cap.  · Keep sites where the tube enters the skin dry and covered with a bandage (dressing).  · Secure the tube 1-2 in (2.5-5.1 cm) below the insertion sites to keep it from pulling on your stitches. The tube is stitched in place and will not slip out.  · Secure the bulb as directed by your health care provider.  · For the first 3 days after surgery, there usually is more fluid in the bulb. Empty the bulb whenever it becomes half  full because the bulb does not create enough suction if it is too full. The bulb could also overflow. Write down how much fluid you remove each time you empty your drain. Add up the amount removed in 24 hours.  · Empty the bulb at the same time every day once the amount of fluid decreases and you only need to empty it once a day. Write down the amounts and the 24-hour totals to give to your health care provider. This helps your health care provider know when the tubes can be removed.  EMPTYING THE BULB DRAIN  Before emptying the bulb, get a measuring cup, a piece of paper and a pen, and wash your hands.  · Gently run your fingers down the tube (stripping) to empty any drainage from the tubing into the bulb. This may need to be done several times a day to clear the tubing of clots and tissue.  · Open the bulb cap to release suction, which causes it to inflate. Do not touch the inside of the cap.  · Gently run your fingers down the tube (stripping) to empty any drainage from the tubing into the bulb.  · Hold the cap out of the way, and pour fluid into the measuring cup.    · Squeeze the bulb to provide suction.   · Replace the cap.    · Check the tape that holds the tube to your skin. If it is becoming loose, you can remove the loose piece of tape and apply a new one. Then, pin the bulb to your shirt.    · Write down the amount of fluid you emptied out. Write down the date and each time you emptied your bulb drain. (If there are 2 bulbs, note the amount of drainage from each bulb and keep the totals separate. Your health care provider will want to know the total amounts for each drain and which tube is draining more.)    · Flush the fluid down the toilet and wash your hands.    · Call your health care provider once you have less than 2 tbsp of fluid collecting in the bulb drain every 24 hours.  If there is drainage around the tube site, change dressings and keep the area dry. Cleanse around tube with sterile saline  and place dry gauze around site. This gauze should be changed when it is soiled. If it stays clean and unsoiled, it should still be changed daily.   SEEK MEDICAL CARE IF:  · Your drainage has a bad smell or is cloudy.    · You have a fever.    · Your drainage is increasing instead of decreasing.    · Your tube fell out.    · You have redness or swelling around the tube site.    · You have drainage from a surgical wound.    · Your bulb drain will not stay flat after you empty it.      DIET: To avoid nausea, slowly advance diet as tolerated, avoiding spicy or greasy foods for the first day.  Add more substantial food to your diet according to your physician's instructions. INCREASE FLUIDS AND FIBER TO AVOID CONSTIPATION.    SURGICAL DRESSING/BATHING: **no tub baths or soaking of incision area until surgeon gives approval.*    FOLLOW-UP APPOINTMENT:  A follow-up appointment should be arranged with your doctor.  Call to schedule.    You should CALL YOUR PHYSICIAN if you develop:  Fever greater than 101 degrees F.  Pain not relieved by medication, or persistent nausea or vomiting.  Excessive bleeding (blood soaking through dressing) or unexpected drainage from the wound.  Extreme redness or swelling around the incision site, drainage of pus or foul smelling drainage.  Inability to urinate or empty your bladder within 8 hours.  Problems with breathing or chest pain.    You should call 911 if you develop problems with breathing or chest pain.  If you are unable to contact your doctor or surgical center, you should go to the nearest emergency room or urgent care center.  Physician's telephone #: **Dr. Baird and Dr. Logan*[phone]    If any questions arise, call your doctor.  If your doctor is not available, please feel free to call the Surgical Center at [phone].  The Center is open Monday through Friday from 7AM to 7PM.  You can also call the HEALTH HOTLINE open 24 hours/day, 7 days/week and speak to a nurse at  [phone], or toll free at [phone].    A registered nurse may call you a few days after your surgery to see how you are doing after your procedure.    MEDICATIONS: Resume taking daily medication.  Take prescribed pain medication with food.  If no medication is prescribed, you may take non-aspirin pain medication if needed.  PAIN MEDICATION CAN BE VERY CONSTIPATING.  Take a stool softener or laxative such as senokot, pericolace, or milk of magnesia if needed.    Prescription given for Norco. No oral pain medication given in recovery.    If your physician has prescribed pain medication that includes Acetaminophen (Tylenol), do not take additional Acetaminophen (Tylenol) while taking the prescribed medication.    Depression / Suicide Risk    As you are discharged from this [facility] facility, it is important to learn how to keep safe from harming yourself.    Recognize the warning signs:  · Abrupt changes in personality, positive or negative- including increase in energy   · Giving away possessions  · Change in eating patterns- significant weight changes-  positive or negative  · Change in sleeping patterns- unable to sleep or sleeping all the time   · Unwillingness or inability to communicate  · Depression  · Unusual sadness, discouragement and loneliness  · Talk of wanting to die  · Neglect of personal appearance   · Rebelliousness- reckless behavior  · Withdrawal from people/activities they love  · Confusion- inability to concentrate     If you or a loved one observes any of these behaviors or has concerns about self-harm, here's what you can do:  · Talk about it- your feelings and reasons for harming yourself  · Remove any means that you might use to hurt yourself (examples: pills, rope, extension cords, firearm)  · Get professional help from the community (Mental Health, Substance Abuse, psychological counseling)  · Do not be alone:Call your Safe Contact- someone whom you trust who will be there  for you.  · Call your local CRISIS HOTLINE 555-7459 or 675-275-0744  · Call your local Children's Mobile Crisis Response Team [place] (179) 074-4740 or www.[employer]  · Call the toll free National Suicide Prevention Hotlines   · National Suicide Prevention Lifeline 039-158-EZCA (9779)  · National Induct[place] Line Network 800-SUICIDE (569-8233)

## 2017-02-08 NOTE — OP REPORT
DATE OF SERVICE:  02/08/2017    SURGEON:  Adriano Baird MD    ASSISTANT:  Rickey Logan MD    PREOPERATIVE DIAGNOSIS:  Cancer of the left breast.    POSTOPERATIVE DIAGNOSIS:  Cancer of the left breast.    PROCEDURE PERFORMED:  Partial mastectomy of the left breast with wire   localization.  Excision of sentinel lymph node from the left axilla and   placement of a SHANTA space holding device.    ANESTHESIA:  General endotracheal anesthesia.    ANESTHESIOLOGIST:  Amandeep Daugherty MD    INDICATIONS:  A 61-year-old woman with a biopsy proven infiltrating ductal   cancer of the left breast.  Options for definitive management were discussed with   her in detail.  She ultimately chose a partial mastectomy, sentinel lymph node   technique, and brachytherapy.  Arrangements were made.    DESCRIPTION OF PROCEDURE:  Procedure discussed in detail with the patient   including the risk of bleeding, infection, abscess, and hematoma.  The   brachytherapy was discussed with her in detail by both myself and Dr. Rincon   of radiation oncology.  The fact that drain and a space holding device would   be left in place were also discussed.  She underwent wire localization without   incident and was brought to the operating room.  She was placed under   anesthesia by Dr. Daugherty.  Her left breast and axilla were prepped with   chlorhexidine prep and sterile drapes.  A gamma counter was used to identify   the area of the sentinel node.  Incision was made over this area.  Dissection   proceeded and the sentinel node was readily identified and removed.  It was   grossly normal in appearance and was sent for permanent pathology slides.    Hemostasis was assured.  A 7 flat drain was placed and this incision was   closed in 2 layers with 3-0 Vicryl and 4-0 Monocryl.  Attention was placed to   the breast.  Using the previously placed wire localization as a guide, a   periareolar incision was made, but the wire was brought into the operative   field.   Dissection proceeded.  Wire and surrounding tissue was removed.  A   specimen radiograph confirmed that the clip placed at the time of the initial   biopsy was present; however, it was at the margin of the specimen.  I was   concerned that perhaps is believed to positive margins and I excised further   tissue along this margin near the clip.  Once this was complete, hemostasis   was assured.  A SHANTA space shoulder was brought out through a separate stab   incision.  The cavity was closed in 2 layers with 3-0 Vicryls and 4-0   Monocryl.  The space holding device for the SHANTA catheter was then filled with   35 mL of fluid.  Sterile dressings were placed.  Patient tolerated the   procedure well without apparent complication.  Final counts were reported as   correct.      Of note, the patient did have Lymphazurin injected by myself prior to   initiating the procedure and this was used in identifying the sentinel node    as well as the gamma radiation.       ____________________________________     MD DEBRA ACOSTA / ISSA    DD:  02/08/2017 12:55:23  DT:  02/08/2017 15:20:08    D#:  [id number]  Job#:  [id number]    cc: Rickey Logan MD, ANN MENEZES MD

## 2017-02-08 NOTE — IP AVS SNAPSHOT
2/8/2017          Adrianne Nunu Juan  [address] [address]    Dear Adrianne:    [facility] wants to ensure your discharge home is safe and you or your loved ones have had all your questions answered regarding your care after you leave the hospital.    You may receive a telephone call within two days of your discharge.  This call is to make certain you understand your discharge instructions as well as ensure we provided you with the best care possible during your stay with us.     The call will only last approximately 3-5 minutes and will be done by a nurse.    Once again, we want to ensure your discharge home is safe and that you have a clear understanding of any next steps in your care.  If you have any questions or concerns, please do not hesitate to contact us, we are here for you.  Thank you for choosing [facility] for your healthcare needs.    Sincerely,    Todd London    [facility]

## 2017-02-09 NOTE — ED NOTES
Chief Complaint   Patient presents with   • Post-Op Complications     bleeding from surgical site.  had lumpectomy today at 1230.  noticed bleeding 45 min ago.  reports had one dressing change since surgery.     Bleeding appears controlled at this time.  Triage process explained to patient.  Pt back to waiting room.  Pt instructed to inform RN if any changes or questions arise.

## 2017-02-15 DIAGNOSIS — Z01.812 PRE-PROCEDURAL LABORATORY EXAMINATION: ICD-10-CM

## 2017-02-15 LAB
ERYTHROCYTE [DISTWIDTH] IN BLOOD BY AUTOMATED COUNT: 40.1 FL (ref 35.9–50)
HCT VFR BLD AUTO: 40.5 % (ref 37–47)
HGB BLD-MCNC: 13.4 G/DL (ref 12–16)
MCH RBC QN AUTO: 30.2 PG (ref 27–33)
MCHC RBC AUTO-ENTMCNC: 33.1 G/DL (ref 33.6–35)
MCV RBC AUTO: 91.2 FL (ref 81.4–97.8)
PLATELET # BLD AUTO: 258 K/UL (ref 164–446)
PMV BLD AUTO: 9.5 FL (ref 9–12.9)
RBC # BLD AUTO: 4.44 M/UL (ref 4.2–5.4)
WBC # BLD AUTO: 4.4 K/UL (ref 4.8–10.8)

## 2017-02-15 PROCEDURE — 36415 COLL VENOUS BLD VENIPUNCTURE: CPT

## 2017-02-15 PROCEDURE — 85027 COMPLETE CBC AUTOMATED: CPT

## 2017-02-15 RX ORDER — CEPHALEXIN 250 MG/1
250 CAPSULE ORAL 3 TIMES DAILY
COMMUNITY
Start: 2017-02-10 | End: 2017-05-05

## 2017-02-16 ENCOUNTER — HOSPITAL ENCOUNTER (OUTPATIENT)
Facility: MEDICAL CENTER | Age: 62
End: 2017-02-16
Attending: SURGERY | Admitting: SURGERY
Payer: COMMERCIAL

## 2017-02-16 VITALS
SYSTOLIC BLOOD PRESSURE: 109 MMHG | WEIGHT: 106.04 LBS | DIASTOLIC BLOOD PRESSURE: 82 MMHG | OXYGEN SATURATION: 97 % | RESPIRATION RATE: 16 BRPM | HEIGHT: 62 IN | TEMPERATURE: 97.7 F | BODY MASS INDEX: 19.51 KG/M2 | HEART RATE: 78 BPM

## 2017-02-16 PROBLEM — Z85.3 HISTORY OF LEFT BREAST CANCER: Status: ACTIVE | Noted: 2017-02-16

## 2017-02-16 PROCEDURE — 160036 HCHG PACU - EA ADDL 30 MINS PHASE I: Performed by: SURGERY

## 2017-02-16 PROCEDURE — 700102 HCHG RX REV CODE 250 W/ 637 OVERRIDE(OP)

## 2017-02-16 PROCEDURE — 700111 HCHG RX REV CODE 636 W/ 250 OVERRIDE (IP)

## 2017-02-16 PROCEDURE — 110371 HCHG SHELL REV 272: Performed by: SURGERY

## 2017-02-16 PROCEDURE — A9270 NON-COVERED ITEM OR SERVICE: HCPCS

## 2017-02-16 PROCEDURE — 160048 HCHG OR STATISTICAL LEVEL 1-5: Performed by: SURGERY

## 2017-02-16 PROCEDURE — 160041 HCHG SURGERY MINUTES - EA ADDL 1 MIN LEVEL 4: Performed by: SURGERY

## 2017-02-16 PROCEDURE — 160047 HCHG PACU  - EA ADDL 30 MINS PHASE II: Performed by: SURGERY

## 2017-02-16 PROCEDURE — 110382 HCHG SHELL REV 271: Performed by: SURGERY

## 2017-02-16 PROCEDURE — 160002 HCHG RECOVERY MINUTES (STAT): Performed by: SURGERY

## 2017-02-16 PROCEDURE — 160025 RECOVERY II MINUTES (STATS): Performed by: SURGERY

## 2017-02-16 PROCEDURE — 88307 TISSUE EXAM BY PATHOLOGIST: CPT

## 2017-02-16 PROCEDURE — 160046 HCHG PACU - 1ST 60 MINS PHASE II: Performed by: SURGERY

## 2017-02-16 PROCEDURE — 500888 HCHG PACK, MINOR BASIN: Performed by: SURGERY

## 2017-02-16 PROCEDURE — 160029 HCHG SURGERY MINUTES - 1ST 30 MINS LEVEL 4: Performed by: SURGERY

## 2017-02-16 PROCEDURE — A4606 OXYGEN PROBE USED W OXIMETER: HCPCS | Performed by: SURGERY

## 2017-02-16 PROCEDURE — 502240 HCHG MISC OR SUPPLY RC 0272: Performed by: SURGERY

## 2017-02-16 PROCEDURE — 160035 HCHG PACU - 1ST 60 MINS PHASE I: Performed by: SURGERY

## 2017-02-16 PROCEDURE — 700101 HCHG RX REV CODE 250

## 2017-02-16 PROCEDURE — 501838 HCHG SUTURE GENERAL: Performed by: SURGERY

## 2017-02-16 PROCEDURE — 160009 HCHG ANES TIME/MIN: Performed by: SURGERY

## 2017-02-16 PROCEDURE — C1728 CATH, BRACHYTX SEED ADM: HCPCS | Performed by: SURGERY

## 2017-02-16 DEVICE — IMPLANTABLE DEVICE: Type: IMPLANTABLE DEVICE | Status: FUNCTIONAL

## 2017-02-16 RX ORDER — DIPHENHYDRAMINE HYDROCHLORIDE 50 MG/ML
INJECTION INTRAMUSCULAR; INTRAVENOUS
Status: COMPLETED
Start: 2017-02-16 | End: 2017-02-16

## 2017-02-16 RX ORDER — CHOLECALCIFEROL (VITAMIN D3) 125 MCG
5000 CAPSULE ORAL
COMMUNITY
End: 2018-03-31

## 2017-02-16 RX ORDER — HYDROCODONE BITARTRATE AND ACETAMINOPHEN 5; 325 MG/1; MG/1
1-2 TABLET ORAL EVERY 4 HOURS PRN
Qty: 20 TAB | Refills: 0 | Status: SHIPPED | OUTPATIENT
Start: 2017-02-16 | End: 2017-05-30

## 2017-02-16 RX ORDER — ONDANSETRON 2 MG/ML
2-4 INJECTION INTRAMUSCULAR; INTRAVENOUS
Status: DISCONTINUED | OUTPATIENT
Start: 2017-02-16 | End: 2017-02-16 | Stop reason: HOSPADM

## 2017-02-16 RX ORDER — SODIUM CHLORIDE, SODIUM LACTATE, POTASSIUM CHLORIDE, CALCIUM CHLORIDE 600; 310; 30; 20 MG/100ML; MG/100ML; MG/100ML; MG/100ML
1000 INJECTION, SOLUTION INTRAVENOUS
Status: DISCONTINUED | OUTPATIENT
Start: 2017-02-16 | End: 2017-02-16 | Stop reason: HOSPADM

## 2017-02-16 RX ORDER — BUPIVACAINE HYDROCHLORIDE AND EPINEPHRINE 5; 5 MG/ML; UG/ML
INJECTION, SOLUTION EPIDURAL; INTRACAUDAL; PERINEURAL
Status: DISCONTINUED | OUTPATIENT
Start: 2017-02-16 | End: 2017-02-16 | Stop reason: HOSPADM

## 2017-02-16 RX ORDER — HYDROCODONE BITARTRATE AND ACETAMINOPHEN 5; 325 MG/1; MG/1
1-2 TABLET ORAL EVERY 4 HOURS PRN
Status: DISCONTINUED | OUTPATIENT
Start: 2017-02-16 | End: 2017-02-16 | Stop reason: HOSPADM

## 2017-02-16 RX ADMIN — FENTANYL CITRATE 50 MCG: 50 INJECTION, SOLUTION INTRAMUSCULAR; INTRAVENOUS at 13:35

## 2017-02-16 RX ADMIN — HYDROCODONE BITARTRATE AND ACETAMINOPHEN 30 ML: 2.5; 108 SOLUTION ORAL at 13:45

## 2017-02-16 RX ADMIN — DIPHENHYDRAMINE HYDROCHLORIDE 12.5 MG: 50 INJECTION INTRAMUSCULAR; INTRAVENOUS at 14:15

## 2017-02-16 RX ADMIN — FENTANYL CITRATE 50 MCG: 50 INJECTION, SOLUTION INTRAMUSCULAR; INTRAVENOUS at 14:00

## 2017-02-16 ASSESSMENT — PAIN SCALES - GENERAL
PAINLEVEL_OUTOF10: 2
PAINLEVEL_OUTOF10: 3
PAINLEVEL_OUTOF10: 5
PAINLEVEL_OUTOF10: 5
PAINLEVEL_OUTOF10: 4

## 2017-02-16 NOTE — IP AVS SNAPSHOT
" Home Care Instructions                                                                                                                Name:Adrianne Martinez  Medical Record Number:[id number]  CSN: [id number]    YOB: 1955   Age: 61 y.o.  Sex: female  HT:1.575 m (5' 2\") WT: 48.1 kg (106 lb 0.7 oz)          Admit Date: 2/16/2017     Discharge Date:   Today's Date: 2/16/2017  Attending Doctor:  Adriano Baird M.D.                  Allergies:  Contrast media with iodine; Cherry; Latex; Oxycontin; Demerol; Nsaids; and Tape                Discharge Instructions       ExitCare® Patient Information ©2014 [employer].    ACTIVITY: Rest and take it easy for the first 24 hours.  A responsible adult is recommended to remain with you during that time.  It is normal to feel sleepy.  We encourage you to not do anything that requires balance, judgment or coordination.    MILD FLU-LIKE SYMPTOMS ARE NORMAL. YOU MAY EXPERIENCE GENERALIZED MUSCLE ACHES, THROAT IRRITATION, HEADACHE AND/OR SOME NAUSEA.    FOR 24 HOURS DO NOT:  Drive, operate machinery or run household appliances.  Drink beer or alcoholic beverages.   Make important decisions or sign legal documents.    SPECIAL INSTRUCTIONS:     Biopsy  Care After  These instructions give you information on caring for yourself after your procedure. Your doctor may also give you more specific instructions. Call your doctor if you have any problems or questions after your procedure.  HOME CARE   · Return to your normal diet and activities as told by your doctor.  · Change your bandages (dressings) as told by your doctor. If skin glue (adhesive) was used, it will peel off in 7 days.  · Only take medicines as told by your doctor.  · Ask your doctor when you can bathe and get your wound wet.  GET HELP RIGHT AWAY IF:  · You see more than a small spot of blood coming from the wound.  · You have redness, puffiness (swelling), or pain.  · You see yellowish-white fluid (pus) coming from " the wound.  · You have a fever.  · You notice a bad smell coming from the wound or bandage.  · You have a rash, trouble breathing, or any allergy problems.  MAKE SURE YOU:   · Understand these instructions.  · Will watch your condition.  · Will get help right away if you are not doing well or get worse.    DIET: To avoid nausea, slowly advance diet as tolerated, avoiding spicy or greasy foods for the first day.  Add more substantial food to your diet according to your physician's instructions.  Babies can be fed formula or breast milk as soon as they are hungry.  INCREASE FLUIDS AND FIBER TO AVOID CONSTIPATION.    SURGICAL DRESSING/BATHING: *no tub baths or soaking of incision area until your surgeon gives approval.  Follow surgeon instructions regarding dressing removal.    FOLLOW-UP APPOINTMENT:  A follow-up appointment should be arranged with your doctor.  Please call to make an appointment tomorrow, Friday.    You should CALL YOUR PHYSICIAN if you develop:  Fever greater than 101 degrees F.  Pain not relieved by medication, or persistent nausea or vomiting.  Excessive bleeding (blood soaking through dressing) or unexpected drainage from the wound.  Extreme redness or swelling around the incision site, drainage of pus or foul smelling drainage.  Inability to urinate or empty your bladder within 8 hours.  Problems with breathing or chest pain.    You should call 911 if you develop problems with breathing or chest pain.  If you are unable to contact your doctor or surgical center, you should go to the nearest emergency room or urgent care center.  Physician's telephone #: **Dr. Baird [phone]    If any questions arise, call your doctor.  If your doctor is not available, please feel free to call the Surgical Center at [phone].  The Center is open Monday through Friday from 7AM to 7PM.  You can also call the "[employer]" HOTLINE open 24 hours/day, 7 days/week and speak to a nurse at [phone], or toll free  at [phone].    A registered nurse may call you a few days after your surgery to see how you are doing after your procedure.    MEDICATIONS: Resume taking daily medication.  Take prescribed pain medication with food.  If no medication is prescribed, you may take non-aspirin pain medication if needed.  PAIN MEDICATION CAN BE VERY CONSTIPATING.  Take a stool softener or laxative such as senokot, pericolace, or milk of magnesia if needed.    Prescription given to  [facility].  Last pain medication given at 1345.    If your physician has prescribed pain medication that includes Acetaminophen (Tylenol), do not take additional Acetaminophen (Tylenol) while taking the prescribed medication.    Depression / Suicide Risk    As you are discharged from this [facility] facility, it is important to learn how to keep safe from harming yourself.    Recognize the warning signs:  · Abrupt changes in personality, positive or negative- including increase in energy   · Giving away possessions  · Change in eating patterns- significant weight changes-  positive or negative  · Change in sleeping patterns- unable to sleep or sleeping all the time   · Unwillingness or inability to communicate  · Depression  · Unusual sadness, discouragement and loneliness  · Talk of wanting to die  · Neglect of personal appearance   · Rebelliousness- reckless behavior  · Withdrawal from people/activities they love  · Confusion- inability to concentrate     If you or a loved one observes any of these behaviors or has concerns about self-harm, here's what you can do:  · Talk about it- your feelings and reasons for harming yourself  · Remove any means that you might use to hurt yourself (examples: pills, rope, extension cords, firearm)  · Get professional help from the community (Mental Health, Substance Abuse, psychological counseling)  · Do not be alone:Call your Safe Contact- someone whom you trust who will be there for you.  · Call your local  CRISIS HOTLINE 978-8341 or 630-414-2990  · Call your local Children's Mobile Crisis Response Team [place] (071) 444-5016 or www.[employer]  · Call the toll free National Suicide Prevention Hotlines   · National Suicide Prevention Lifeline 739-112-BXEG (1926)  · [facility] Line Network 800-SUICIDE (193-4610)       Medication List      CHANGE how you take these medications        Instructions    * hydrocodone-acetaminophen 5-325 MG Tabs per tablet   What changed:  You were already taking a medication with the same name, and this prescription was added. Make sure you understand how and when to take each.   Commonly known as:  NORCO    Take 1-2 Tabs by mouth every four hours as needed.   Dose:  1-2 Tab       * hydrocodone-acetaminophen 5-325 MG Tabs per tablet   What changed:  Another medication with the same name was added. Make sure you understand how and when to take each.   Commonly known as:  NORCO    Take 1-2 Tabs by mouth every four hours as needed. Indications: Moderate to Moderately Severe Pain   Dose:  1-2 Tab       * Notice:  This list has 2 medication(s) that are the same as other medications prescribed for you. Read the directions carefully, and ask your doctor or other care provider to review them with you.      CONTINUE taking these medications        Instructions    AMBIEN 10 MG Tabs   Generic drug:  zolpidem    Take 5-10 mg by mouth at bedtime as needed for Sleep.   Dose:  5-10 mg       CALCIUM & MAGNESIUM CARBONATES PO    Take 1 Tab by mouth every day.   Dose:  1 Tab       cephALEXin 250 MG Caps   Commonly known as:  KEFLEX    Take 250 mg by mouth 3 times a day. 14 day course started 2/8/17   Dose:  250 mg       cyanocobalamin 100 MCG Tabs   Commonly known as:  VITAMIN B-12    Take 100 mcg by mouth every day.   Dose:  100 mcg       DHEA 25 MG Tabs    Take 25 mg by mouth every day.   Dose:  25 mg       duloxetine 20 MG Cpep   Commonly known as:  CYMBALTA    Take 20 mg by mouth every day.     Dose:  20 mg       fish oil 1000 MG Caps capsule    Take 1,000 mg by mouth every day.   Dose:  1000 mg       pregabalin 25 MG Caps   Commonly known as:  LYRICA    Take 25 mg by mouth every day.   Dose:  25 mg       tizanidine 4 MG Tabs   Commonly known as:  ZANAFLEX    Take 4 mg by mouth at bedtime as needed.   Dose:  4 mg       Vitamin D3 2000 UNITS Tabs    Take 1 Tab by mouth 2 Times a Day.   Dose:  1 Tab               Medication Information     Above and/or attached are the medications your physician expects you to take upon discharge. Review all of your home medications and newly ordered medications with your doctor and/or pharmacist. Follow medication instructions as directed by your doctor and/or pharmacist. Please keep your medication list with you and share with your physician. Update the information when medications are discontinued, doses are changed, or new medications (including over-the-counter products) are added; and carry medication information at all times in the event of emergency situations.        Resources     Quit Smoking / Tobacco Use:    I understand the use of any tobacco products increases my chance of suffering from future heart disease or stroke and could cause other illnesses which may shorten my life. Quitting the use of tobacco products is the single most important thing I can do to improve my health. For further information on smoking / tobacco cessation call a Toll Free Quit Line at 1-363.165.8630 (*[employer] [place]) or 1-431.599.4570 (American Lung Association) or you can access the web based program at www.lungusa.org.    [place] Tobacco Users Help Line:  (480) 161-9911       Toll Free: 1-203.985.5695  Quit Tobacco Program [facility] (667)866-9285    Crisis Hotline:    National Crisis Hotline:  9-132-OYMFSGF or 1-814.607.3030    [place] Crisis Hotline:    1-308.157.9458 or 784-604-6062    Discharge Survey:   Thank you for choosing [facility]. We  hope we did everything we could to make your hospital stay a pleasant one. You may be receiving a survey and we would appreciate your time and participation in answering the questions. Your input is very valuable to us in our efforts to improve our service to our patients and their families.            Signatures     My signature on this form indicates that:    1. I acknowledge receipt and understanding of these Home Care Instruction.  2. My questions regarding this information have been answered to my satisfaction.  3. I have formulated a plan with my discharge nurse to obtain my prescribed medications for home.    __________________________________      __________________________________                   Patient Signature                                 Guardian/Responsible Adult Signature      __________________________________                 __________       ________                       Nurse Signature                                               Date                 Time

## 2017-02-16 NOTE — OP REPORT
DATE OF SERVICE:  02/16/2017    SURGEON:  Adriano Baird MD    Assist: Korin WILLIAMSON    PREOPERATIVE DIAGNOSIS:  Positive margins after partial mastectomy.    POSTOPERATIVE DIAGNOSIS:  Positive margins after partial mastectomy.    PROCEDURE PERFORMED:  Reexcision for margins.    ANESTHESIA:  General LMA anesthesia.    ANESTHESIOLOGIST:  Amandeep Summers MD    INDICATIONS:  A 61-year-old woman, who underwent a partial mastectomy,   sentinel lymph node biopsy for an infiltrating cancer.  Unfortunately, she did   have a positive margin reexcision was indicated.    DESCRIPTION OF PROCEDURE:  Procedure was discussed in detail with the patient,   including the risk of bleeding, infection, and hematoma.  She understood all   the above and wished to proceed.  She was placed under anesthesia by Dr. Summers.  Left breast was prepped with chlorhexidine prep sterile drapes.    After a timeout, the previous incision was opened.  The entire biopsy cavity   was reexcised.  The new margins were marked with stitches and hemostasis was   assured.  A Mammosite space holding device was placed as there was not a   Savory device available and filled with 30 mL of fluid.  Sterile dressings   were placed.  The patient tolerated the procedure well without apparent   complication.  Final counts reported as correct.       ____________________________________     MD DEBRA ACOSTA / NTS    DD:  02/16/2017 13:14:48  DT:  02/16/2017 15:40:01    D#:  [id number]  Job#:  [id number]

## 2017-02-16 NOTE — OR NURSING
Discharge instructions discussed with both pt and  [facility].both verbalized understanding.  Dressing was re taped for safety. Pt was able to ambulate to the restroom and void without difficulty. Pt was dressed. IV removed and dressed. Pt taken to car via wheelchair.

## 2017-02-16 NOTE — DISCHARGE INSTRUCTIONS
ExitCare® Patient Information ©2014 [employer].    ACTIVITY: Rest and take it easy for the first 24 hours.  A responsible adult is recommended to remain with you during that time.  It is normal to feel sleepy.  We encourage you to not do anything that requires balance, judgment or coordination.    MILD FLU-LIKE SYMPTOMS ARE NORMAL. YOU MAY EXPERIENCE GENERALIZED MUSCLE ACHES, THROAT IRRITATION, HEADACHE AND/OR SOME NAUSEA.    FOR 24 HOURS DO NOT:  Drive, operate machinery or run household appliances.  Drink beer or alcoholic beverages.   Make important decisions or sign legal documents.    SPECIAL INSTRUCTIONS:     Biopsy  Care After  These instructions give you information on caring for yourself after your procedure. Your doctor may also give you more specific instructions. Call your doctor if you have any problems or questions after your procedure.  HOME CARE   · Return to your normal diet and activities as told by your doctor.  · Change your bandages (dressings) as told by your doctor. If skin glue (adhesive) was used, it will peel off in 7 days.  · Only take medicines as told by your doctor.  · Ask your doctor when you can bathe and get your wound wet.  GET HELP RIGHT AWAY IF:  · You see more than a small spot of blood coming from the wound.  · You have redness, puffiness (swelling), or pain.  · You see yellowish-white fluid (pus) coming from the wound.  · You have a fever.  · You notice a bad smell coming from the wound or bandage.  · You have a rash, trouble breathing, or any allergy problems.  MAKE SURE YOU:   · Understand these instructions.  · Will watch your condition.  · Will get help right away if you are not doing well or get worse.    DIET: To avoid nausea, slowly advance diet as tolerated, avoiding spicy or greasy foods for the first day.  Add more substantial food to your diet according to your physician's instructions.  Babies can be fed formula or breast milk as soon as they are hungry.   INCREASE FLUIDS AND FIBER TO AVOID CONSTIPATION.    SURGICAL DRESSING/BATHING: *no tub baths or soaking of incision area until your surgeon gives approval.  Follow surgeon instructions regarding dressing removal.    FOLLOW-UP APPOINTMENT:  A follow-up appointment should be arranged with your doctor.  Please call to make an appointment tomorrow, Friday.    You should CALL YOUR PHYSICIAN if you develop:  Fever greater than 101 degrees F.  Pain not relieved by medication, or persistent nausea or vomiting.  Excessive bleeding (blood soaking through dressing) or unexpected drainage from the wound.  Extreme redness or swelling around the incision site, drainage of pus or foul smelling drainage.  Inability to urinate or empty your bladder within 8 hours.  Problems with breathing or chest pain.    You should call 911 if you develop problems with breathing or chest pain.  If you are unable to contact your doctor or surgical center, you should go to the nearest emergency room or urgent care center.  Physician's telephone #: **Dr. Baird [phone]    If any questions arise, call your doctor.  If your doctor is not available, please feel free to call the Surgical Center at [phone].  The Center is open Monday through Friday from 7AM to 7PM.  You can also call the [employer] HOTLINE open 24 hours/day, 7 days/week and speak to a nurse at [phone], or toll free at [phone].    A registered nurse may call you a few days after your surgery to see how you are doing after your procedure.    MEDICATIONS: Resume taking daily medication.  Take prescribed pain medication with food.  If no medication is prescribed, you may take non-aspirin pain medication if needed.  PAIN MEDICATION CAN BE VERY CONSTIPATING.  Take a stool softener or laxative such as senokot, pericolace, or milk of magnesia if needed.    Prescription given to  [facility].  Last pain medication given at 1345.    If your physician has prescribed pain  medication that includes Acetaminophen (Tylenol), do not take additional Acetaminophen (Tylenol) while taking the prescribed medication.    Depression / Suicide Risk    As you are discharged from this [facility] facility, it is important to learn how to keep safe from harming yourself.    Recognize the warning signs:  · Abrupt changes in personality, positive or negative- including increase in energy   · Giving away possessions  · Change in eating patterns- significant weight changes-  positive or negative  · Change in sleeping patterns- unable to sleep or sleeping all the time   · Unwillingness or inability to communicate  · Depression  · Unusual sadness, discouragement and loneliness  · Talk of wanting to die  · Neglect of personal appearance   · Rebelliousness- reckless behavior  · Withdrawal from people/activities they love  · Confusion- inability to concentrate     If you or a loved one observes any of these behaviors or has concerns about self-harm, here's what you can do:  · Talk about it- your feelings and reasons for harming yourself  · Remove any means that you might use to hurt yourself (examples: pills, rope, extension cords, firearm)  · Get professional help from the community (Mental Health, Substance Abuse, psychological counseling)  · Do not be alone:Call your Safe Contact- someone whom you trust who will be there for you.  · Call your local CRISIS HOTLINE 725-1687 or 550-707-6053  · Call your local Children's Mobile Crisis Response Team [place] (140) 863-5374 or www.[employer]  · Call the toll free National Suicide Prevention Hotlines   · National Suicide Prevention Lifeline 862-254-ZRTK (3364)  · National Hope Line Network 800-SUICIDE (126-0325)

## 2017-02-16 NOTE — IP AVS SNAPSHOT
2/16/2017          Adriannematthew Eddy Juan  [address] [address]    Dear Adrianne:    [facility] wants to ensure your discharge home is safe and you or your loved ones have had all your questions answered regarding your care after you leave the hospital.    You may receive a telephone call within two days of your discharge.  This call is to make certain you understand your discharge instructions as well as ensure we provided you with the best care possible during your stay with us.     The call will only last approximately 3-5 minutes and will be done by a nurse.    Once again, we want to ensure your discharge home is safe and that you have a clear understanding of any next steps in your care.  If you have any questions or concerns, please do not hesitate to contact us, we are here for you.  Thank you for choosing [facility] for your healthcare needs.    Sincerely,    Todd London    [facility]

## 2017-02-16 NOTE — PROGRESS NOTES
The Medication Reconciliation has been completed per patient  Allergies have been reviewed  Antibiotic use in 30 days - On a 10 day keflex started 2/10/17    Pharmacy:  [facility]

## 2017-02-21 PROCEDURE — 77334 RADIATION TREATMENT AID(S): CPT | Performed by: RADIOLOGY

## 2017-02-21 PROCEDURE — 77263 THER RADIOLOGY TX PLNG CPLX: CPT | Performed by: RADIOLOGY

## 2017-02-21 PROCEDURE — 77334 RADIATION TREATMENT AID(S): CPT | Mod: 26 | Performed by: RADIOLOGY

## 2017-02-21 PROCEDURE — 77470 SPECIAL RADIATION TREATMENT: CPT | Mod: 26 | Performed by: RADIOLOGY

## 2017-02-21 PROCEDURE — 77290 THER RAD SIMULAJ FIELD CPLX: CPT | Mod: 26 | Performed by: RADIOLOGY

## 2017-02-21 PROCEDURE — 77280 THER RAD SIMULAJ FIELD SMPL: CPT | Performed by: RADIOLOGY

## 2017-02-23 PROCEDURE — 77290 THER RAD SIMULAJ FIELD CPLX: CPT | Performed by: RADIOLOGY

## 2017-02-23 PROCEDURE — 77470 SPECIAL RADIATION TREATMENT: CPT | Performed by: RADIOLOGY

## 2017-02-24 ENCOUNTER — HOSPITAL ENCOUNTER (OUTPATIENT)
Dept: RADIATION ONCOLOGY | Facility: MEDICAL CENTER | Age: 62
End: 2017-02-24

## 2017-02-24 PROCEDURE — C1717 BRACHYTX, NON-STR,HDR IR-192: HCPCS | Performed by: RADIOLOGY

## 2017-02-24 PROCEDURE — 77771 HDR RDNCL NTRSTL/ICAV BRCHTX: CPT | Performed by: RADIOLOGY

## 2017-02-24 PROCEDURE — 77370 RADIATION PHYSICS CONSULT: CPT | Performed by: RADIOLOGY

## 2017-02-24 PROCEDURE — 77295 3-D RADIOTHERAPY PLAN: CPT | Mod: 26 | Performed by: RADIOLOGY

## 2017-02-24 PROCEDURE — 77295 3-D RADIOTHERAPY PLAN: CPT | Performed by: RADIOLOGY

## 2017-02-24 PROCEDURE — 77331 SPECIAL RADIATION DOSIMETRY: CPT | Mod: 26,XE | Performed by: RADIOLOGY

## 2017-02-24 PROCEDURE — 77771 HDR RDNCL NTRSTL/ICAV BRCHTX: CPT | Mod: 26 | Performed by: RADIOLOGY

## 2017-02-24 PROCEDURE — 77331 SPECIAL RADIATION DOSIMETRY: CPT | Performed by: RADIOLOGY

## 2017-02-24 PROCEDURE — 77300 RADIATION THERAPY DOSE PLAN: CPT | Performed by: RADIOLOGY

## 2017-02-27 ENCOUNTER — HOSPITAL ENCOUNTER (OUTPATIENT)
Dept: RADIATION ONCOLOGY | Facility: MEDICAL CENTER | Age: 62
End: 2017-02-27

## 2017-02-27 PROCEDURE — 77295 3-D RADIOTHERAPY PLAN: CPT | Mod: 26 | Performed by: RADIOLOGY

## 2017-02-27 PROCEDURE — 77771 HDR RDNCL NTRSTL/ICAV BRCHTX: CPT | Mod: 26,XE | Performed by: RADIOLOGY

## 2017-02-27 PROCEDURE — C1717 BRACHYTX, NON-STR,HDR IR-192: HCPCS | Performed by: RADIOLOGY

## 2017-02-27 PROCEDURE — 77280 THER RAD SIMULAJ FIELD SMPL: CPT | Performed by: RADIOLOGY

## 2017-02-27 PROCEDURE — 77295 3-D RADIOTHERAPY PLAN: CPT | Performed by: RADIOLOGY

## 2017-02-27 PROCEDURE — 77771 HDR RDNCL NTRSTL/ICAV BRCHTX: CPT | Performed by: RADIOLOGY

## 2017-02-27 PROCEDURE — 77336 RADIATION PHYSICS CONSULT: CPT | Performed by: RADIOLOGY

## 2017-02-27 PROCEDURE — 77300 RADIATION THERAPY DOSE PLAN: CPT | Performed by: RADIOLOGY

## 2017-02-28 PROCEDURE — C1717 BRACHYTX, NON-STR,HDR IR-192: HCPCS | Performed by: RADIOLOGY

## 2017-02-28 PROCEDURE — 77771 HDR RDNCL NTRSTL/ICAV BRCHTX: CPT | Mod: 26,XE | Performed by: RADIOLOGY

## 2017-02-28 PROCEDURE — 77280 THER RAD SIMULAJ FIELD SMPL: CPT | Performed by: RADIOLOGY

## 2017-02-28 PROCEDURE — 77280 THER RAD SIMULAJ FIELD SMPL: CPT | Mod: 26,XE | Performed by: RADIOLOGY

## 2017-02-28 PROCEDURE — 77771 HDR RDNCL NTRSTL/ICAV BRCHTX: CPT | Performed by: RADIOLOGY

## 2017-03-01 ENCOUNTER — HOSPITAL ENCOUNTER (OUTPATIENT)
Dept: RADIATION ONCOLOGY | Facility: MEDICAL CENTER | Age: 62
End: 2017-03-01

## 2017-03-01 ENCOUNTER — HOSPITAL ENCOUNTER (OUTPATIENT)
Dept: RADIATION ONCOLOGY | Facility: MEDICAL CENTER | Age: 62
End: 2017-03-31
Attending: RADIOLOGY
Payer: COMMERCIAL

## 2017-03-01 PROCEDURE — C1717 BRACHYTX, NON-STR,HDR IR-192: HCPCS | Performed by: RADIOLOGY

## 2017-03-01 PROCEDURE — 77771 HDR RDNCL NTRSTL/ICAV BRCHTX: CPT | Performed by: RADIOLOGY

## 2017-03-01 PROCEDURE — 77771 HDR RDNCL NTRSTL/ICAV BRCHTX: CPT | Mod: 26,XE | Performed by: RADIOLOGY

## 2017-03-01 PROCEDURE — 77280 THER RAD SIMULAJ FIELD SMPL: CPT | Performed by: RADIOLOGY

## 2017-03-01 PROCEDURE — 77280 THER RAD SIMULAJ FIELD SMPL: CPT | Mod: 26,XE | Performed by: RADIOLOGY

## 2017-03-01 PROCEDURE — 77336 RADIATION PHYSICS CONSULT: CPT | Performed by: RADIOLOGY

## 2017-03-02 ENCOUNTER — HOSPITAL ENCOUNTER (OUTPATIENT)
Dept: RADIATION ONCOLOGY | Facility: MEDICAL CENTER | Age: 62
End: 2017-03-02

## 2017-03-02 PROCEDURE — 77280 THER RAD SIMULAJ FIELD SMPL: CPT | Mod: 26,XE | Performed by: RADIOLOGY

## 2017-03-02 PROCEDURE — C1717 BRACHYTX, NON-STR,HDR IR-192: HCPCS | Performed by: RADIOLOGY

## 2017-03-02 PROCEDURE — 77280 THER RAD SIMULAJ FIELD SMPL: CPT | Performed by: RADIOLOGY

## 2017-03-02 PROCEDURE — 77771 HDR RDNCL NTRSTL/ICAV BRCHTX: CPT | Mod: 26,XE | Performed by: RADIOLOGY

## 2017-03-02 PROCEDURE — 77771 HDR RDNCL NTRSTL/ICAV BRCHTX: CPT | Mod: XE | Performed by: RADIOLOGY

## 2017-03-07 RX ORDER — ZOLPIDEM TARTRATE 10 MG/1
10 TABLET ORAL NIGHTLY PRN
Qty: 15 TAB | Refills: 2 | Status: SHIPPED | OUTPATIENT
Start: 2017-03-07 | End: 2017-05-16 | Stop reason: SDUPTHER

## 2017-03-20 ENCOUNTER — HOSPITAL ENCOUNTER (OUTPATIENT)
Dept: RADIOLOGY | Facility: MEDICAL CENTER | Age: 62
End: 2017-03-20
Attending: INTERNAL MEDICINE
Payer: COMMERCIAL

## 2017-03-20 DIAGNOSIS — D24.2 BENIGN NEOPLASM OF LEFT BREAST: ICD-10-CM

## 2017-03-20 PROCEDURE — 77080 DXA BONE DENSITY AXIAL: CPT

## 2017-04-08 ENCOUNTER — OFFICE VISIT (OUTPATIENT)
Dept: URGENT CARE | Facility: CLINIC | Age: 62
End: 2017-04-08
Payer: COMMERCIAL

## 2017-04-08 VITALS
OXYGEN SATURATION: 99 % | TEMPERATURE: 97.3 F | SYSTOLIC BLOOD PRESSURE: 102 MMHG | HEART RATE: 78 BPM | RESPIRATION RATE: 16 BRPM | DIASTOLIC BLOOD PRESSURE: 64 MMHG

## 2017-04-08 DIAGNOSIS — R21 RASH: ICD-10-CM

## 2017-04-08 PROCEDURE — 99214 OFFICE O/P EST MOD 30 MIN: CPT | Performed by: NURSE PRACTITIONER

## 2017-04-08 RX ORDER — PREDNISONE 20 MG/1
20 TABLET ORAL 2 TIMES DAILY
Qty: 10 TAB | Refills: 0 | Status: SHIPPED | OUTPATIENT
Start: 2017-04-08 | End: 2017-04-13

## 2017-04-08 RX ORDER — HYDROXYZINE HYDROCHLORIDE 25 MG/1
25 TABLET, FILM COATED ORAL 3 TIMES DAILY PRN
Qty: 15 TAB | Refills: 0 | Status: SHIPPED | OUTPATIENT
Start: 2017-04-08 | End: 2017-04-13

## 2017-04-08 NOTE — MR AVS SNAPSHOT
Adrianne Eddy Juan   2017 1:00 PM   Office Visit   MRN: [id number]    Department:  [facility]   Dept Phone:  [phone]    Description:  Female : 1955   Provider:  THUY Patterson           Allergies as of 2017     Allergen Noted Reactions    Contrast Media With Iodine [Iodine] 2013       Malaise, weakness, inability to get up off table, mental status changes    Cherry 10/29/2013   Rash    Latex 2017       rash    Oxycontin [Oxycodone Hcl] 2014   Vomiting    Able to take vicodin.    Demerol 2010   Vomiting    Nsaids 2013   Rash    Tape 2017       Rash, blister       You were diagnosed with     Rash   [[id number]]         Vital Signs     Blood Pressure Pulse Temperature Respirations Oxygen Saturation Smoking Status    102/64 mmHg 78 36.3 °C (97.3 °F) 16 99% Never Smoker       Basic Information     Date Of Birth Sex Race Ethnicity Preferred Language    1955 Female  Non- English      Your appointments     May 09, 2017  2:30 PM   Follow Up with Shannon PILLAI M.D.   [facility] Radiation Therapy (--)    [address] [place] [id number]   [phone]              Problem List              ICD-10-CM Priority Class Noted - Resolved    Postmenopausal HRT (hormone replacement therapy) Z79.890   Unknown - Present    Insomnia G47.00   Unknown - Present    Chronic pelvic pain in female R10.2, G89.29   Unknown - Present    ENDOMETRIOSIS    Unknown - Present    Recurrent UTI N39.0   Unknown - Present    Stress reaction F43.0   2013 - Present    Chronic pain G89.29   2013 - Present    Weight loss R63.4   2013 - Present    Disturbance in sleep behavior G47.9   2013 - Present    Family history of colon cancer Z80.0   Unknown - Present    Right sided sciatica M54.31   2015 - Present    Craving for particular food R63.8   2015 - Present    Irritable bowel syndrome (IBS) K58.9   2016 - Present    Dermatitis L30.9    1/19/2016 - Present    Symptomatic menopausal or female climacteric states N95.1   3/8/2016 - Present    Frozen shoulder M75.00   3/8/2016 - Present    Headache syndrome G44.89   7/12/2016 - Present    History of opthalmic migraine Z86.69   7/12/2016 - Present    Spells R68.89   12/14/2016 - Present    Atherosclerosis of right carotid artery, mild I65.21   12/15/2016 - Present    Diverticulosis of large intestine without hemorrhage, per 12/23/16 COLONOSCOPY K57.30   1/14/2017 - Present    Adenomatous polyp of colon, per 12/23/16 COLONOSCOPY D12.6   1/14/2017 - Present    Infiltrating ductal carcinoma of left breast (CMS-HCC) C50.912   1/19/2017 - Present    Ductal carcinoma in situ (DCIS) of left breast, low grade D05.12   1/19/2017 - Present    Malignant neoplasm of central portion of left female breast (CMS-HCC) C50.112   2/8/2017 - Present    History of left breast cancer Z85.3   2/16/2017 - Present      Health Maintenance        Date Due Completion Dates    MAMMOGRAM 1/10/2018 1/10/2017, 12/12/2016, 4/20/2015, 4/9/2014, 3/6/2012, 9/15/2010, 9/14/2009, 9/14/2009, 8/29/2008, 8/29/2008, 12/6/2006, 11/21/2006, 9/8/2005    COLONOSCOPY 12/23/2021 12/23/2016    IMM DTaP/Tdap/Td Vaccine (2 - Td) 10/29/2023 10/29/2013            Current Immunizations     Hep A/HEP B Combined Vaccine (TwinRix) 2/8/2016    Influenza TIV (IM) 10/29/2013    Influenza Vaccine Quad Inj (Pf) 12/21/2015, 11/12/2014    Influenza Vaccine Quad Inj (Preserved) 10/24/2016    SHINGLES VACCINE 7/31/2015    Tdap Vaccine 10/29/2013      Below and/or attached are the medications your provider expects you to take. Review all of your home medications and newly ordered medications with your provider and/or pharmacist. Follow medication instructions as directed by your provider and/or pharmacist. Please keep your medication list with you and share with your provider. Update the information when medications are discontinued, doses are changed, or new  medications (including over-the-counter products) are added; and carry medication information at all times in the event of emergency situations     Allergies:  CONTRAST MEDIA WITH IODINE - (reactions not documented)     CHERRY - Rash     LATEX - (reactions not documented)     OXYCONTIN - Vomiting     DEMEROL - Vomiting     NSAIDS - Rash     TAPE - (reactions not documented)               Medications  Valid as of: April 08, 2017 -  2:42 PM    Generic Name Brand Name Tablet Size Instructions for use    Calcium & Magnesium Carbonates   Take 1 Tab by mouth every day.        Cephalexin (Cap) KEFLEX 250 MG Take 250 mg by mouth 3 times a day. 14 day course started 2/8/17        Cholecalciferol (Tab) Vitamin D3 2000 UNITS Take 1 Tab by mouth 2 Times a Day.        Cyanocobalamin (Tab) VITAMIN B-12 100 MCG Take 100 mcg by mouth every day.        DULoxetine HCl (Cap DR Particles) CYMBALTA 20 MG Take 20 mg by mouth every day.        Hydrocodone-Acetaminophen (Tab) NORCO 5-325 MG Take 1-2 Tabs by mouth every four hours as needed. Indications: Moderate to Moderately Severe Pain        Hydrocodone-Acetaminophen (Tab) NORCO 5-325 MG Take 1-2 Tabs by mouth every four hours as needed.        HydrOXYzine HCl (Tab) ATARAX 25 MG Take 1 Tab by mouth 3 times a day as needed for Itching for up to 5 days.        Omega-3 Fatty Acids (Cap) fish oil 1000 MG Take 1,000 mg by mouth every day.        Prasterone (DHEA) (Tab) DHEA 25 MG Take 25 mg by mouth every day.        PredniSONE (Tab) DELTASONE 20 MG Take 1 Tab by mouth 2 times a day for 5 days.        Pregabalin (Cap) LYRICA 25 MG Take 25 mg by mouth every day.        TiZANidine HCl (Tab) ZANAFLEX 4 MG Take 4 mg by mouth at bedtime as needed.        Zolpidem Tartrate (Tab) AMBIEN 10 MG Take 1 Tab by mouth at bedtime as needed for Sleep.        .                 Medicines prescribed today were sent to:     [id number] [place] [facility] [address]    [address]  [address]    Phone: [phone] Fax: [phone]    Open 24 Hours?: No    [id number] - [place], [place] - [address] AT [facility]    [address] [place] [address]    Phone: [phone] Fax: [phone]    Open 24 Hours?: No      Medication refill instructions:       If your prescription bottle indicates you have medication refills left, it is not necessary to call your provider’s office. Please contact your pharmacy and they will refill your medication.    If your prescription bottle indicates you do not have any refills left, you may request refills at any time through one of the following ways: The online [employer] system (except Urgent Care), by calling your provider’s office, or by asking your pharmacy to contact your provider’s office with a refill request. Medication refills are processed only during regular business hours and may not be available until the next business day. Your provider may request additional information or to have a follow-up visit with you prior to refilling your medication.   *Please Note: Medication refills are assigned a new Rx number when refilled electronically. Your pharmacy may indicate that no refills were authorized even though a new prescription for the same medication is available at the pharmacy. Please request the medicine by name with the pharmacy before contacting your provider for a refill.        Other Notes About Your Plan     4/14 Labs [facility] scanned    Tdap 2013  Mammogram- 4/14              Colonoscopy-done 2010 (need every 5 years)        Pap-overdue!           [email or web address] Access Code: Activation code not generated  Current [employer] Status: Active

## 2017-04-11 ASSESSMENT — ENCOUNTER SYMPTOMS
DIAPHORESIS: 0
FEVER: 0
WEAKNESS: 0
MYALGIAS: 0
CHILLS: 0
SHORTNESS OF BREATH: 0

## 2017-04-11 NOTE — PROGRESS NOTES
Subjective:      Adrianne Martinez is a 61 y.o. female who presents with No chief complaint on file.            HPI  Patient comes in today with a pruritic rash on her posterior neck and back for the past 4 days.  She has taken Benadryl with minimal relief of symptoms.  She denies any new soaps, lotions, or detergents.  No suspected exposure to new foods.  No angioedema or difficulty breathing.  She reports the itching is particularly bothersome at all times and interferes with her sleep.    Review of Systems   Constitutional: Negative for fever, chills, malaise/fatigue and diaphoresis.   Respiratory: Negative for shortness of breath.    Cardiovascular: Negative for chest pain.   Musculoskeletal: Negative for myalgias.   Skin: Positive for itching and rash.   Neurological: Negative for weakness.     Medications, Allergies, and current problem list reviewed today in Epic     Objective:     /64 mmHg  Pulse 78  Temp(Src) 36.3 °C (97.3 °F)  Resp 16  SpO2 99%     Physical Exam   Constitutional: She is oriented to person, place, and time. She appears well-developed and well-nourished. No distress.   Cardiovascular: Normal rate, regular rhythm and normal heart sounds.    Pulmonary/Chest: Effort normal and breath sounds normal. No respiratory distress. She has no wheezes. She has no rales. She exhibits no tenderness.   Neurological: She is alert and oriented to person, place, and time.   Skin: Rash noted. Rash is macular. She is not diaphoretic.        Lightly erythematous macular rash on the posterior neck and back.  No vesicles or papules.  No purulence, crusting, or weeping.  No evidence of secondary skin infection.   Vitals reviewed.              Assessment/Plan:     1. Rash  - predniSONE (DELTASONE) 20 MG Tab; Take 1 Tab by mouth 2 times a day for 5 days.  Dispense: 10 Tab; Refill: 0  - hydrOXYzine (ATARAX) 25 MG Tab; Take 1 Tab by mouth 3 times a day as needed for Itching for up to 5 days.  Dispense: 15 Tab;  Refill: 0    Discussed exam findings with patient.  Use only dye-free, perfume-free soaps, lotions, and detergents.  Follow up in 1 week if symptoms persist, sooner if worse.  Patient verbalized understanding of and agreed with plan of care.

## 2017-05-05 ENCOUNTER — OFFICE VISIT (OUTPATIENT)
Dept: INTERNAL MEDICINE | Facility: IMAGING CENTER | Age: 62
End: 2017-05-05
Payer: COMMERCIAL

## 2017-05-05 VITALS
HEART RATE: 80 BPM | DIASTOLIC BLOOD PRESSURE: 60 MMHG | WEIGHT: 105 LBS | OXYGEN SATURATION: 94 % | SYSTOLIC BLOOD PRESSURE: 90 MMHG | HEIGHT: 62 IN | RESPIRATION RATE: 14 BRPM | TEMPERATURE: 99 F | BODY MASS INDEX: 19.32 KG/M2

## 2017-05-05 DIAGNOSIS — G89.29 OTHER CHRONIC PAIN: ICD-10-CM

## 2017-05-05 DIAGNOSIS — G44.209 ACUTE NON INTRACTABLE TENSION-TYPE HEADACHE: ICD-10-CM

## 2017-05-05 DIAGNOSIS — R21 SKIN RASH: ICD-10-CM

## 2017-05-05 DIAGNOSIS — C50.912 INFILTRATING DUCTAL CARCINOMA OF LEFT BREAST (HCC): ICD-10-CM

## 2017-05-05 DIAGNOSIS — F11.20 CHRONIC NARCOTIC DEPENDENCE (HCC): ICD-10-CM

## 2017-05-05 PROBLEM — D05.12 DUCTAL CARCINOMA IN SITU (DCIS) OF LEFT BREAST: Status: RESOLVED | Noted: 2017-01-19 | Resolved: 2017-05-05

## 2017-05-05 PROBLEM — C50.112 MALIGNANT NEOPLASM OF CENTRAL PORTION OF LEFT FEMALE BREAST (HCC): Status: RESOLVED | Noted: 2017-02-08 | Resolved: 2017-05-05

## 2017-05-05 PROBLEM — Z85.3 HISTORY OF LEFT BREAST CANCER: Status: RESOLVED | Noted: 2017-02-16 | Resolved: 2017-05-05

## 2017-05-05 PROCEDURE — 99214 OFFICE O/P EST MOD 30 MIN: CPT | Performed by: FAMILY MEDICINE

## 2017-05-05 RX ORDER — ANASTROZOLE 1 MG/1
1 TABLET ORAL DAILY
COMMUNITY
End: 2017-05-30

## 2017-05-05 RX ORDER — TRIAMCINOLONE ACETONIDE 0.25 MG/G
OINTMENT TOPICAL
Qty: 1 TUBE | Refills: 0 | Status: SHIPPED | OUTPATIENT
Start: 2017-05-05 | End: 2017-05-09

## 2017-05-05 NOTE — PROGRESS NOTES
Chief Complaint   Patient presents with   • Headache     left sided headache x 9 days   • Rash       HPI:  Patient is a 61 y.o. female established patient who presents today for evaluation of a new, nine day left sided headache that has not resolved. Pt reports that the headache is not affected by her chronic narcotics/ denies associated visual changes/ no N/V/D and she chronically has tight shoulder muscles. She denies trauma or other inciting factors. She and her  traveled to [facility] and had a 36 hour travel time back to [place] with very little sleep. While on vacation, pt had an outbreak of a skin rash on her back that was treated with 60mg prednisone daily/H2 blocker/anti-itch medication - made her have diarrhea. Pt subsequently went to a local UC upon arrival back to [place] and further treatment oral steroid treatment provided. The pt reports that the visible rash has resolved but she feels that the rash may be reappearing on the tops of her ears and left side of her forehead. She recently started 5 year treatment course for anastrazole for history of L breast CA and finished radiation treatment in March.     Patient Active Problem List    Diagnosis Date Noted   • Infiltrating ductal carcinoma of left breast (CMS-HCC) 01/19/2017   • Diverticulosis of large intestine without hemorrhage, per 12/23/16 COLONOSCOPY 01/14/2017   • Adenomatous polyp of colon, per 12/23/16 COLONOSCOPY 01/14/2017   • Atherosclerosis of right carotid artery, mild 12/15/2016   • Spells 12/14/2016   • Headache syndrome 07/12/2016   • History of opthalmic migraine 07/12/2016   • Symptomatic menopausal or female climacteric states 03/08/2016   • Frozen shoulder 03/08/2016   • Irritable bowel syndrome (IBS) 01/19/2016   • Dermatitis 01/19/2016   • Craving for particular food 12/09/2015   • Right sided sciatica 08/21/2015   • Family history of colon cancer    • Stress reaction 08/13/2013   • Chronic pain 08/13/2013   • Weight loss  "08/13/2013   • Postmenopausal HRT (hormone replacement therapy)    • Insomnia    • Chronic pelvic pain in female    • ENDOMETRIOSIS    • Recurrent UTI      Past medical, surgical, family, and social history was reviewed and updated in Epic chart by me today.     Medications and allergies reviewed and updated in Epic chart by me today.     ROS:  Pertinent positives listed above in HPI. All other systems have been reviewed and are negative.    PE:   BP 90/60 mmHg  Pulse 80  Temp(Src) 37.2 °C (99 °F)  Resp 14  Ht 1.575 m (5' 2.01\")  Wt 47.628 kg (105 lb)  BMI 19.20 kg/m2  SpO2 94%  Vital signs reviewed with patient.     Gen: Well developed; well nourished; no acute distress; age appropriate appearance   HEENT: Normocephalic; atraumatic; PEERLA b/l; sclera clear b/l; b/l external auditory canals WNL; b/l TM WNL; oropharynx clear; oral mucosa moist; tongue midline; dentition adequate   Neck: No adenopathy; no thyromegaly; b/l SCM/trapezius muscle spasms noted with palpation and chin turning side to side/ no central spinal canal pain  CV: Regular rate and rhythm; S1/ S2 present; no murmur, gallop or rub noted  Pulm: No respiratory distress; clear to ascultation b/l; no wheezing or stridor noted b/l  Abd: Adequate bowel sounds noted; soft and nontender; no rebound, rigidity, nor distention; no hepatosplenogmegaly   Extremities: No peripheral edema b/l LE extremities/ no clubbing nor cyanosis noted  Skin: Warm and dry; very minor pinpoint raised bumps noted on L forehead area/ tops of ears  Neuro: No focal deficits noted   Psych: AAOx4; mood and affect are appropriate but flat    A/P:  1. Acute non intractable tension-type headache  Recommend supportive care/ pt can increase her tizanidine to TID PRN for spasm control/ continue heating pad PRN/ gentle stretching and massage. Pt does not want CT head.     2. Skin rash  Unclear etiology at this time. Will try kenalog ointment and follow response  - triamcinolone " (KENALOG) 0.025 % ointment; Apply small amount of ointment twice per day to affected skin areas until resolved.  Dispense: 1 Tube; Refill: 0    3. Infiltrating ductal carcinoma of left breast (CMS-HCC)  Stable/ pt recently started arimidex daily/ she is to continue f/u with Dr. Dutta as scheduled.    4. Other chronic pain/ chronic narcotic dependence (CMS-HCC)  Stable/ pt obtains her chronic narcotics and tizanidine from Dr. Mckay    Pt is to f/u with me in the fall for her annual exam/ PRN sooner if her current condition changes.

## 2017-05-05 NOTE — MR AVS SNAPSHOT
"        Adrianne Martinez   2017 8:00 AM   Office Visit   MRN: [id number]    Department:  [facility]   Dept Phone:  [phone]    Description:  Female : 1955   Provider:  Danielle Blas M.D.           Reason for Visit     Headache left sided headache x 9 days    Rash           Allergies as of 2017     Allergen Noted Reactions    Contrast Media With Iodine [Iodine] 2013       Malaise, weakness, inability to get up off table, mental status changes    Cherry 10/29/2013   Rash    Latex 2017       rash    Oxycontin [Oxycodone Hcl] 2014   Vomiting    Able to take vicodin.    Demerol 2010   Vomiting    Nsaids 2013   Rash    Tape 2017       Rash, blister       You were diagnosed with     Acute non intractable tension-type headache   [[id number]]       Skin rash   [[id number]]       Infiltrating ductal carcinoma of left breast (CMS-HCC)   [0841596]       Other chronic pain   [338.29.ICD-9-CM]         Vital Signs     Blood Pressure Pulse Temperature Respirations Height Weight    90/60 mmHg 80 37.2 °C (99 °F) 14 1.575 m (5' 2.01\") 47.628 kg (105 lb)    Body Mass Index Oxygen Saturation Smoking Status             19.20 kg/m2 94% Never Smoker          Basic Information     Date Of Birth Sex Race Ethnicity Preferred Language    1955 Female  Non- English      Your appointments     May 08, 2017  9:30 AM   ACUPUNCTURE 30 with David Vieyra M.D.   [facility] (--)    [address] [facility]  [facility] [id number] [phone]            May 09, 2017  2:30 PM   Follow Up with Shannon PILLAI M.D.   [facility] Radiation Therapy (--)    [address] [place] [address] [phone]              Problem List              ICD-10-CM Priority Class Noted - Resolved    Postmenopausal HRT (hormone replacement therapy) Z79.890   Unknown - Present    Insomnia G47.00   Unknown - Present    Chronic pelvic pain in female R10.2, " G89.29   Unknown - Present    ENDOMETRIOSIS    Unknown - Present    Recurrent UTI N39.0   Unknown - Present    Stress reaction F43.0   8/13/2013 - Present    Chronic pain G89.29   8/13/2013 - Present    Weight loss R63.4   8/13/2013 - Present    Family history of colon cancer Z80.0   Unknown - Present    Right sided sciatica M54.31   8/21/2015 - Present    Craving for particular food R63.8   12/9/2015 - Present    Irritable bowel syndrome (IBS) K58.9   1/19/2016 - Present    Dermatitis L30.9   1/19/2016 - Present    Symptomatic menopausal or female climacteric states N95.1   3/8/2016 - Present    Frozen shoulder M75.00   3/8/2016 - Present    Headache syndrome G44.89   7/12/2016 - Present    History of opthalmic migraine Z86.69   7/12/2016 - Present    Spells [id number]   12/14/2016 - Present    Atherosclerosis of right carotid artery, mild I65.21   12/15/2016 - Present    Diverticulosis of large intestine without hemorrhage, per 12/23/16 COLONOSCOPY K57.30   1/14/2017 - Present    Adenomatous polyp of colon, per 12/23/16 COLONOSCOPY D12.6   1/14/2017 - Present    Infiltrating ductal carcinoma of left breast (CMS-HCC) C50.912   1/19/2017 - Present      Health Maintenance        Date Due Completion Dates    MAMMOGRAM 1/10/2018 1/10/2017, 12/12/2016, 4/20/2015, 4/9/2014, 3/6/2012, 9/15/2010, 9/14/2009, 9/14/2009, 8/29/2008, 8/29/2008, 12/6/2006, 11/21/2006, 9/8/2005    COLONOSCOPY 12/23/2021 12/23/2016    IMM DTaP/Tdap/Td Vaccine (2 - Td) 10/29/2023 10/29/2013            Current Immunizations     Hep A/HEP B Combined Vaccine (TwinRix) 2/8/2016    Influenza TIV (IM) 10/29/2013    Influenza Vaccine Quad Inj (Pf) 12/21/2015, 11/12/2014    Influenza Vaccine Quad Inj (Preserved) 10/24/2016    SHINGLES VACCINE 7/31/2015    Tdap Vaccine 10/29/2013      Below and/or attached are the medications your provider expects you to take. Review all of your home medications and newly ordered medications with your provider and/or  pharmacist. Follow medication instructions as directed by your provider and/or pharmacist. Please keep your medication list with you and share with your provider. Update the information when medications are discontinued, doses are changed, or new medications (including over-the-counter products) are added; and carry medication information at all times in the event of emergency situations     Allergies:  CONTRAST MEDIA WITH IODINE - (reactions not documented)     CHERRY - Rash     LATEX - (reactions not documented)     OXYCONTIN - Vomiting     DEMEROL - Vomiting     NSAIDS - Rash     TAPE - (reactions not documented)               Medications  Valid as of: May 05, 2017 -  9:30 AM    Generic Name Brand Name Tablet Size Instructions for use    Anastrozole (Tab) ARIMIDEX 1 MG Take 1 mg by mouth every day.        Calcium & Magnesium Carbonates   Take 1 Tab by mouth every day.        Cholecalciferol (Tab) Vitamin D3 2000 UNITS Take 1 Tab by mouth 2 Times a Day.        Cyanocobalamin (Tab) VITAMIN B-12 100 MCG Take 100 mcg by mouth every day.        DULoxetine HCl (Cap DR Particles) CYMBALTA 20 MG Take 20 mg by mouth every day.        Hydrocodone-Acetaminophen (Tab) NORCO 5-325 MG Take 1-2 Tabs by mouth every four hours as needed.        Omega-3 Fatty Acids (Cap) fish oil 1000 MG Take 1,000 mg by mouth every day.        Prasterone (DHEA) (Tab) DHEA 25 MG Take 25 mg by mouth every day.        Pregabalin (Cap) LYRICA 25 MG Take 25 mg by mouth every day.        TiZANidine HCl (Tab) ZANAFLEX 4 MG Take 4 mg by mouth at bedtime as needed.        Triamcinolone Acetonide (Ointment) KENALOG 0.025 % Apply small amount of ointment twice per day to affected skin areas until resolved.        Zolpidem Tartrate (Tab) AMBIEN 10 MG Take 1 Tab by mouth at bedtime as needed for Sleep.        .                 Medicines prescribed today were sent to:     [id number] - [place], [place] - [address]    [address] [place] [address]      Phone: [phone] Fax: [phone]    Open 24 Hours?: No    [id number] - [place] - [address] [facility] [address]    [address] [facility] [place] [address]    Phone: [phone] Fax: [phone]    Open 24 Hours?: No      Medication refill instructions:       If your prescription bottle indicates you have medication refills left, it is not necessary to call your provider’s office. Please contact your pharmacy and they will refill your medication.    If your prescription bottle indicates you do not have any refills left, you may request refills at any time through one of the following ways: The online [email or web address] system (except Urgent Care), by calling your provider’s office, or by asking your pharmacy to contact your provider’s office with a refill request. Medication refills are processed only during regular business hours and may not be available until the next business day. Your provider may request additional information or to have a follow-up visit with you prior to refilling your medication.   *Please Note: Medication refills are assigned a new Rx number when refilled electronically. Your pharmacy may indicate that no refills were authorized even though a new prescription for the same medication is available at the pharmacy. Please request the medicine by name with the pharmacy before contacting your provider for a refill.        Other Notes About Your Plan     [facility]  Pain Management: Dr. Woody Gill Onc: Wan  Onc: Dr. Tra Haq Access Code: Activation code not generated  Current [email or web address] Status: Active

## 2017-05-08 ENCOUNTER — OFFICE VISIT (OUTPATIENT)
Dept: OTHER | Facility: IMAGING CENTER | Age: 62
End: 2017-05-08
Payer: COMMERCIAL

## 2017-05-08 DIAGNOSIS — M54.2 CERVICALGIA: ICD-10-CM

## 2017-05-08 DIAGNOSIS — M54.50 ACUTE LEFT-SIDED LOW BACK PAIN WITHOUT SCIATICA: ICD-10-CM

## 2017-05-08 DIAGNOSIS — F51.01 PRIMARY INSOMNIA: ICD-10-CM

## 2017-05-08 DIAGNOSIS — Z85.3 HISTORY OF BREAST CANCER: ICD-10-CM

## 2017-05-08 DIAGNOSIS — M54.31 RIGHT SIDED SCIATICA: ICD-10-CM

## 2017-05-08 PROCEDURE — 97813 ACUP 1/> W/ESTIM 1ST 15 MIN: CPT | Performed by: FAMILY MEDICINE

## 2017-05-08 PROCEDURE — 97811 ACUP 1/> W/O ESTIM EA ADD 15: CPT | Performed by: FAMILY MEDICINE

## 2017-05-08 PROCEDURE — 99213 OFFICE O/P EST LOW 20 MIN: CPT | Mod: 25 | Performed by: FAMILY MEDICINE

## 2017-05-08 NOTE — PATIENT INSTRUCTIONS
The side effects of Acupuncture needle insertion include: minor bruising, bleeding, or pain at the site of needle insertion.  If more worrisome symptoms, such as continued bleeding, severe bruising, or continue pain or altered sensation persist, please contact [facility] office @ [phone]

## 2017-05-08 NOTE — PROGRESS NOTES
"Subjective:      Adrianne Martinez is a 60 y.o. female who presents with No chief complaint on file.        [facility] Medical Acupuncture Progress Note  [address] [address]  Dept: [phone]      Patient Name: Adrianne Martinez   MRN: [id number]  YOB: 1955  PCP: Cyrus Mcgowan M.D.  Date of Service: 5/8/2017  9:33 AM    CC Adrianne - h/o L breast cancer.  Right IT band and hip pain.   L Lumbago.  Insomnia.    Cervicalgia R.     HPI She had a L breat lumpectomy and short course Radiation in March.  She's on an estrogen blocker.   No pain.      She's been travelling, just back from [facility] - she was there for 15 days.  She feels like the the R hip and R neck aren't doing well.  She had an allergic reaction to recent hair dye / shampoo     Whole neck and shoulder.  L lower back is tight as well.  R hip is bothering her.      Poor sleeping.    ROS Birthplace: [place], [place], TIme Unknown - ? Born in the early evening per family  Color: Blue  Pain Management - [employer]  Season:Spring and fall for the activities she can do.  Right-handed  Scars: Left breast, L elbow  Has a dog - emotional support dog.  2 dogs.    She had a large weight loss  Doesn't like water  Sees Dr. Monet   [facility] Past Medical History   Diagnosis Date   • Postmenopausal HRT (hormone replacement therapy)    • Insomnia    • Chronic pelvic pain in female    • ENDOMETRIOSIS    • Recurrent UTI    • Family history of colon cancer    • Breast cancer (CMS-HCC)      left   • Endometriosis    • Colon polyp    • Gynecological disorder    • Heart burn    • Indigestion    • Snoring    • Jaundice 1967      r/t hepatitis    • Bowel habit changes      constipation    • Pneumonia 2006   • Cataract      bilat IOL   • Pain      right hip, neck, back    • Psychiatric problem      depression    • Anesthesia      \"heart stopped during colonoscopy\"    • Urine frequency    • Seizure (CMS-HCC)      last seizure 8/2016 (only had 2 seizures did " "not follow up with neuro, no meds)    • Cancer (CMS-HCC) 2017     left breast   • Hepatitis      as a child, does not know what kind \"was very sick, then I recovered\"      Past Surgical History   Procedure Laterality Date   • Lumpectomy  2007     Left - benign   • Other orthopedic surgery  1964     ORIF L elbow compound fx   • Breast biopsy       left breast   • Breast biopsy       left breast biopsy 2008 (benign)   • Laparoscopy  [id number]     multiple for endometriosis   • Cataract extraction with iol Bilateral    • Abdominal hysterectomy total  1999     for endometriosis   • Ovarian cystectomy  1985   • Mastectomy Left 2/8/2017     Procedure: MASTECTOMY - PARTIAL PLACEMENT OF CAVITY EVALUATION DEVICE;  Surgeon: Adriano Baird M.D.;  Location: SURGERY [facility];  Service:    • Node biopsy sentinel  2/8/2017     Procedure: NODE BIOPSY SENTINEL;  Surgeon: Adriano Baird M.D.;  Location: SURGERY [facility];  Service:    • Breast biopsy Left 2/16/2017     Procedure: BREAST BIOPSY-RE EXCISION FOR MARGINS ;  Surgeon: Adriano Baird M.D.;  Location: SURGERY [facility];  Service:        Social History     Social History   • Marital Status:      Spouse Name: N/A   • Number of Children: 2   • Years of Education: N/A     Social History Main Topics   • Smoking status: Never Smoker    • Smokeless tobacco: Never Used   • Alcohol Use: 0.6 oz/week     1 Glasses of wine per week      Comment: 1-3 per week    • Drug Use: No   • Sexual Activity:     Partners: Male     Other Topics Concern   • Not on file     Social History Narrative    Was previously an  - didn't like her job - didn't like conflict.        MEDS Current Outpatient Prescriptions on File Prior to Visit   Medication Sig Dispense Refill   • anastrozole (ARIMIDEX) 1 MG Tab Take 1 mg by mouth every day.     • triamcinolone (KENALOG) 0.025 % ointment Apply small amount of ointment twice per day to affected skin areas until " resolved. 1 Tube 0   • zolpidem (AMBIEN) 10 MG Tab Take 1 Tab by mouth at bedtime as needed for Sleep. 15 Tab 2   • Cholecalciferol (VITAMIN D3) 2000 UNITS Tab Take 1 Tab by mouth 2 Times a Day.     • hydrocodone-acetaminophen (NORCO) 5-325 MG Tab per tablet Take 1-2 Tabs by mouth every four hours as needed. 20 Tab 0   • duloxetine (CYMBALTA) 20 MG Cap DR Particles Take 20 mg by mouth every day.     • pregabalin (LYRICA) 25 MG Cap Take 25 mg by mouth every day.     • Omega-3 Fatty Acids (FISH OIL) 1000 MG Cap capsule Take 1,000 mg by mouth every day.     • CALCIUM & MAGNESIUM CARBONATES PO Take 1 Tab by mouth every day.     • DHEA 25 MG Tab Take 25 mg by mouth every day.     • cyanocobalamin (VITAMIN B-12) 100 MCG Tab Take 100 mcg by mouth every day.     • tizanidine (ZANAFLEX) 4 MG Tab Take 4 mg by mouth at bedtime as needed.       No current facility-administered medications on file prior to visit.      ALLERGIES Allergies   Allergen Reactions   • Contrast Media With Iodine [Iodine]      Malaise, weakness, inability to get up off table, mental status changes   • Cherry Rash   • Latex      rash   • Oxycontin [Oxycodone Hcl] Vomiting     Able to take vicodin.   • Demerol Vomiting   • Nsaids Rash   • Tape      Rash, blister       PE Tongue and pulse not examined today       Assessment [facility]ia  Yang Hubbard ([place]), ?surrender case  Stagnation Qi to BL/GB/ST, Jue Yin Jacobo yang axis    Western Encounter Diagnoses   Name Primary?   • Right sided sciatica    • Acute left-sided low back pain without sciatica    • Cervicalgia    • Primary insomnia    • History of breast cancer                   Plan Set 1: RLE Bl59-->+++Bl40 / GB39-->+++GB34 (non-alphastim)  Set 2: LLE KI8-->+++Ki10 / Yin ankle x 4  Set 2: Ear Jiménez Men, Yin Parson, GV20, Flori Jiménez [place]  Set 3: Nasrin White 3:6     >15 min spent face to face, not including procedure.  >50% of the face to face time was spent in counseling and coordination. Topics  discussed included:  concequences of hormonal blockade, and whether or not there is evidence about non-pharmaceutical methods of hormonal blockade.  Benefits and detriments of hormonal blockade of ER+ breast cancer  Total acupuncture treatment time = 45 minutes      David Vieyra MD

## 2017-05-08 NOTE — MR AVS SNAPSHOT
Adrianne Martinez   2017 9:30 AM   Office Visit   MRN: [id number]    Department:  Acupuncture Med   Dept Phone:  [phone]    Description:  Female : 1955   Provider:  David Vieyra M.D.           Allergies as of 2017     Allergen Noted Reactions    Contrast Media With Iodine [Iodine] 2013       Malaise, weakness, inability to get up off table, mental status changes    Cherry 10/29/2013   Rash    Latex 2017       rash    Oxycontin [Oxycodone Hcl] 2014   Vomiting    Able to take vicodin.    Demerol 2010   Vomiting    Nsaids 2013   Rash    Tape 2017       Rash, blister       Vital Signs     Smoking Status                   Never Smoker            Basic Information     Date Of Birth Sex Race Ethnicity Preferred Language    1955 Female  Non- English      Your appointments     May 09, 2017  2:30 PM   Follow Up with Shannon PILLAI M.D.   [facility] Radiation Therapy (--)    [address]  [employer] [place] [address]   [phone]            May 15, 2017 11:00 AM   ACUPUNCTURE 30 with David Vieyra M.D.   [facility] Acupuncture and Integrative Medicine (--)    [address]  [place] [address]   [phone]            May 22, 2017  9:30 AM   ACUPUNCTURE 30 with David Vieyra M.D.   [facility]upuncture [facility] Integr[facility] (--)    [address]  [place] [address]   [phone]            May 30, 2017 11:00 AM   ACUPUNCTURE 30 with David Vieyra M.D.   [facility]upuncture [facility] (--)    [address]  [place] [address]   [phone]              Problem List              ICD-10-CM Priority Class Noted - Resolved    Postmenopausal HRT (hormone replacement therapy) Z79.890   Unknown - Present    Insomnia G47.00   Unknown - Present    Chronic pelvic pain in female R10.2, G89.29   Unknown - Present    ENDOMETRIOSIS    Unknown - Present    Recurrent UTI N39.0   Unknown - Present    Stress  reaction F43.0   8/13/2013 - Present    Chronic pain G89.29   8/13/2013 - Present    Weight loss R63.4   8/13/2013 - Present    Family history of colon cancer Z80.0   Unknown - Present    Right sided sciatica M54.31   8/21/2015 - Present    Irritable bowel syndrome (IBS) K58.9   1/19/2016 - Present    Dermatitis L30.9   1/19/2016 - Present    Symptomatic menopausal or female climacteric states N95.1   3/8/2016 - Present    Frozen shoulder M75.00   3/8/2016 - Present    Headache syndrome G44.89   7/12/2016 - Present    History of opthalmic migraine Z86.69   7/12/2016 - Present    Spells [id number]   12/14/2016 - Present    Atherosclerosis of right carotid artery, mild I65.21   12/15/2016 - Present    Diverticulosis of large intestine without hemorrhage, per 12/23/16 COLONOSCOPY K57.30   1/14/2017 - Present    Adenomatous polyp of colon, per 12/23/16 COLONOSCOPY D12.6   1/14/2017 - Present    Infiltrating ductal carcinoma of left breast (CMS-HCC) C50.912   1/19/2017 - Present    Chronic narcotic dependence (CMS-HCC) F11.20   5/5/2017 - Present      Health Maintenance        Date Due Completion Dates    MAMMOGRAM 1/10/2018 1/10/2017, 12/12/2016, 4/20/2015, 4/9/2014, 3/6/2012, 9/15/2010, 9/14/2009, 9/14/2009, 8/29/2008, 8/29/2008, 12/6/2006, 11/21/2006, 9/8/2005    COLONOSCOPY 12/23/2021 12/23/2016    IMM DTaP/Tdap/Td Vaccine (2 - Td) 10/29/2023 10/29/2013            Current Immunizations     Hep A/HEP B Combined Vaccine (TwinRix) 2/8/2016    Influenza TIV (IM) 10/29/2013    Influenza Vaccine Quad Inj (Pf) 12/21/2015, 11/12/2014    Influenza Vaccine Quad Inj (Preserved) 10/24/2016    SHINGLES VACCINE 7/31/2015    Tdap Vaccine 10/29/2013      Below and/or attached are the medications your provider expects you to take. Review all of your home medications and newly ordered medications with your provider and/or pharmacist. Follow medication instructions as directed by your provider and/or pharmacist. Please keep your  medication list with you and share with your provider. Update the information when medications are discontinued, doses are changed, or new medications (including over-the-counter products) are added; and carry medication information at all times in the event of emergency situations     Allergies:  CONTRAST MEDIA WITH IODINE - (reactions not documented)     CHERRY - Rash     LATEX - (reactions not documented)     OXYCONTIN - Vomiting     DEMEROL - Vomiting     NSAIDS - Rash     TAPE - (reactions not documented)               Medications  Valid as of: May 08, 2017 - 10:43 AM    Generic Name Brand Name Tablet Size Instructions for use    Anastrozole (Tab) ARIMIDEX 1 MG Take 1 mg by mouth every day.        Calcium & Magnesium Carbonates   Take 1 Tab by mouth every day.        Cholecalciferol (Tab) Vitamin D3 2000 UNITS Take 1 Tab by mouth 2 Times a Day.        Cyanocobalamin (Tab) VITAMIN B-12 100 MCG Take 100 mcg by mouth every day.        DULoxetine HCl (Cap DR Particles) CYMBALTA 20 MG Take 20 mg by mouth every day.        Hydrocodone-Acetaminophen (Tab) NORCO 5-325 MG Take 1-2 Tabs by mouth every four hours as needed.        Omega-3 Fatty Acids (Cap) fish oil 1000 MG Take 1,000 mg by mouth every day.        Prasterone (DHEA) (Tab) DHEA 25 MG Take 25 mg by mouth every day.        Pregabalin (Cap) LYRICA 25 MG Take 25 mg by mouth every day.        TiZANidine HCl (Tab) ZANAFLEX 4 MG Take 4 mg by mouth at bedtime as needed.        Triamcinolone Acetonide (Ointment) KENALOG 0.025 % Apply small amount of ointment twice per day to affected skin areas until resolved.        Zolpidem Tartrate (Tab) AMBIEN 10 MG Take 1 Tab by mouth at bedtime as needed for Sleep.        .                 Medicines prescribed today were sent to:     [facility]/PHARMACY #[id number] - [place] - [address]    [address] [place] [address] [address] [address]    Phone: [phone] Fax: [phone]    Open 24 Hours?: No    [facility] - [place] -  [address] [facility]    [address] [place] [address]    Phone: [phone] Fax: [phone]    Open 24 Hours?: No      Medication refill instructions:       If your prescription bottle indicates you have medication refills left, it is not necessary to call your provider’s office. Please contact your pharmacy and they will refill your medication.    If your prescription bottle indicates you do not have any refills left, you may request refills at any time through one of the following ways: The online [employer] system (except Urgent Care), by calling your provider’s office, or by asking your pharmacy to contact your provider’s office with a refill request. Medication refills are processed only during regular business hours and may not be available until the next business day. Your provider may request additional information or to have a follow-up visit with you prior to refilling your medication.   *Please Note: Medication refills are assigned a new Rx number when refilled electronically. Your pharmacy may indicate that no refills were authorized even though a new prescription for the same medication is available at the pharmacy. Please request the medicine by name with the pharmacy before contacting your provider for a refill.        Other Notes About Your Plan     [facility]  Pain Management: Dr. Woody Gill Onc: Wan  Onc: Dr. Tra Haq Access Code: Activation code not generated  Current [employer] Status: Active

## 2017-05-09 ENCOUNTER — HOSPITAL ENCOUNTER (OUTPATIENT)
Dept: RADIATION ONCOLOGY | Facility: MEDICAL CENTER | Age: 62
End: 2017-05-31
Attending: RADIOLOGY
Payer: COMMERCIAL

## 2017-05-09 VITALS — OXYGEN SATURATION: 93 % | SYSTOLIC BLOOD PRESSURE: 112 MMHG | TEMPERATURE: 97.5 F | DIASTOLIC BLOOD PRESSURE: 71 MMHG

## 2017-05-09 DIAGNOSIS — Z85.3 HISTORY OF BREAST CANCER: ICD-10-CM

## 2017-05-09 PROCEDURE — 99212 OFFICE O/P EST SF 10 MIN: CPT | Performed by: RADIOLOGY

## 2017-05-10 ENCOUNTER — PATIENT OUTREACH (OUTPATIENT)
Dept: OTHER | Facility: MEDICAL CENTER | Age: 62
End: 2017-05-10

## 2017-05-10 NOTE — PROGRESS NOTES
Met with patient to review breast cancer treatment summary and survivorship care plan.     Late or long term effects noted at this visit: rash, she stated she discussed with Dr. Rincon    NCCN Survivorship Assessment:   Cardiac:pt did not receive anthracycline therapy. Pt denied SOB at rest or persistent leg swelling.   Anxiety/depression:Pt stated she has been anxious but this is unrelated to her cancer or treatment, but rather related to family issues. Daughter attempted suicide recently. Referred to SW.    changes in memory or concentration: pt denied  Fatigue: pt denied  Pain: pt denied  Physical function: pt denied problems  Sexual function:not addressed   Fertility: not addressed  Sleep disorder:not addressed   Financial barriers: pt denied. She did request information on how to pay her bill, referred to .   Weight changes: pt denied     General [facility] breast cancer follow up guidelines reviewed with patient. Patient referred to treating physician for individualized follow up schedule and recommendations / questions.    Scanned to EPIC

## 2017-05-15 ENCOUNTER — OFFICE VISIT (OUTPATIENT)
Dept: OTHER | Facility: IMAGING CENTER | Age: 62
End: 2017-05-15
Payer: COMMERCIAL

## 2017-05-15 DIAGNOSIS — F51.01 PRIMARY INSOMNIA: ICD-10-CM

## 2017-05-15 DIAGNOSIS — R52 BODY ACHES: ICD-10-CM

## 2017-05-15 DIAGNOSIS — Z85.3 HISTORY OF BREAST CANCER: ICD-10-CM

## 2017-05-15 DIAGNOSIS — M54.31 RIGHT SIDED SCIATICA: ICD-10-CM

## 2017-05-15 DIAGNOSIS — E28.39 ESTROGEN DEFICIENCY: ICD-10-CM

## 2017-05-15 PROCEDURE — 97811 ACUP 1/> W/O ESTIM EA ADD 15: CPT | Performed by: FAMILY MEDICINE

## 2017-05-15 PROCEDURE — 99213 OFFICE O/P EST LOW 20 MIN: CPT | Mod: 25 | Performed by: FAMILY MEDICINE

## 2017-05-15 PROCEDURE — 97813 ACUP 1/> W/ESTIM 1ST 15 MIN: CPT | Performed by: FAMILY MEDICINE

## 2017-05-15 NOTE — MR AVS SNAPSHOT
Adrianne Martinez   5/15/2017 11:00 AM   Office Visit   MRN: [id number]    Department:  Acupuncture Med   Dept Phone:  [phone]    Description:  Female : 1955   Provider:  David Vieyra M.D.           Allergies as of 5/15/2017     Allergen Noted Reactions    Contrast Media With Iodine [Iodine] 2013       Malaise, weakness, inability to get up off table, mental status changes    Cherry 10/29/2013   Rash    Latex 2017       rash    Oxycontin [Oxycodone Hcl] 2014   Vomiting    Able to take vicodin.    Demerol 2010   Vomiting    Nsaids 2013   Rash    Tape 2017       Rash, blister       You were diagnosed with     Body aches   [378840]       History of breast cancer   [231494]       Primary insomnia   [454535]       Right sided sciatica   [253942]       Estrogen deficiency   [482831]         Vital Signs     Smoking Status                   Never Smoker            Basic Information     Date Of Birth Sex Race Ethnicity Preferred Language    1955 Female  Non- English      Your appointments     May 22, 2017  9:30 AM   ACUPUNCTURE 30 with David Vieyra M.D.   [facility] Acupuncture and Integrative Medicine (--)    [address]  [place] [address]   [phone]            May 30, 2017 11:00 AM   ACUPUNCTURE 30 with David Vieyra M.D.   [facility] Acupuncture and Integrative Medicine (--)    [address]  [place] [address]   [phone]            Sep 11, 2017 10:30 AM   MA DX30 with [facility] MG 1   [facility] ([address])    [address] [place] [address] [address]  [facility] [address]   [phone]              Problem List              ICD-10-CM Priority Class Noted - Resolved    Postmenopausal HRT (hormone replacement therapy) Z79.890   Unknown - Present    Insomnia G47.00   Unknown - Present    Chronic pelvic pain in female R10.2, G89.29   Unknown - Present    ENDOMETRIOSIS    Unknown - Present    Recurrent UTI  N39.0   Unknown - Present    Stress reaction F43.0   8/13/2013 - Present    Chronic pain G89.29   8/13/2013 - Present    Weight loss R63.4   8/13/2013 - Present    Family history of colon cancer Z80.0   Unknown - Present    Right sided sciatica M54.31   8/21/2015 - Present    Irritable bowel syndrome (IBS) K58.9   1/19/2016 - Present    Dermatitis L30.9   1/19/2016 - Present    Symptomatic menopausal or female climacteric states N95.1   3/8/2016 - Present    Frozen shoulder M75.00   3/8/2016 - Present    Headache syndrome G44.89   7/12/2016 - Present    History of opthalmic migraine Z86.69   7/12/2016 - Present    Spells [id number]   12/14/2016 - Present    Atherosclerosis of right carotid artery, mild I65.21   12/15/2016 - Present    Diverticulosis of large intestine without hemorrhage, per 12/23/16 COLONOSCOPY K57.30   1/14/2017 - Present    Adenomatous polyp of colon, per 12/23/16 COLONOSCOPY D12.6   1/14/2017 - Present    Infiltrating ductal carcinoma of left breast (CMS-HCC) C50.912   1/19/2017 - Present    Chronic narcotic dependence (CMS-HCC) F11.20   5/5/2017 - Present    History of breast cancer Z85.3   5/8/2017 - Present      Health Maintenance        Date Due Completion Dates    MAMMOGRAM 1/10/2018 1/10/2017, 12/12/2016, 4/20/2015, 4/9/2014, 3/6/2012, 9/15/2010, 9/14/2009, 9/14/2009, 8/29/2008, 8/29/2008, 12/6/2006, 11/21/2006, 9/8/2005    COLONOSCOPY 12/23/2021 12/23/2016    IMM DTaP/Tdap/Td Vaccine (2 - Td) 10/29/2023 10/29/2013            Current Immunizations     Hep A/HEP B Combined Vaccine (TwinRix) 2/8/2016    Influenza TIV (IM) 10/29/2013    Influenza Vaccine Quad Inj (Pf) 12/21/2015, 11/12/2014    Influenza Vaccine Quad Inj (Preserved) 10/24/2016    SHINGLES VACCINE 7/31/2015    Tdap Vaccine 10/29/2013      Below and/or attached are the medications your provider expects you to take. Review all of your home medications and newly ordered medications with your provider and/or pharmacist. Follow  medication instructions as directed by your provider and/or pharmacist. Please keep your medication list with you and share with your provider. Update the information when medications are discontinued, doses are changed, or new medications (including over-the-counter products) are added; and carry medication information at all times in the event of emergency situations     Allergies:  CONTRAST MEDIA WITH IODINE - (reactions not documented)     CHERRY - Rash     LATEX - (reactions not documented)     OXYCONTIN - Vomiting     DEMEROL - Vomiting     NSAIDS - Rash     TAPE - (reactions not documented)               Medications  Valid as of: May 15, 2017 - 12:12 PM    Generic Name Brand Name Tablet Size Instructions for use    Anastrozole (Tab) ARIMIDEX 1 MG Take 1 mg by mouth every day.        Calcium & Magnesium Carbonates   Take 1 Tab by mouth every day.        Cholecalciferol (Tab) Vitamin D3 2000 UNITS Take 1 Tab by mouth 2 Times a Day.        Cyanocobalamin (Tab) VITAMIN B-12 100 MCG Take 100 mcg by mouth every day.        DULoxetine HCl (Cap DR Particles) CYMBALTA 20 MG Take 20 mg by mouth every day.        Hydrocodone-Acetaminophen (Tab) NORCO 5-325 MG Take 1-2 Tabs by mouth every four hours as needed.        Omega-3 Fatty Acids (Cap) fish oil 1000 MG Take 1,000 mg by mouth every day.        Prasterone (DHEA) (Tab) DHEA 25 MG Take 25 mg by mouth every day.        Pregabalin (Cap) LYRICA 25 MG Take 25 mg by mouth every day.        TiZANidine HCl (Tab) ZANAFLEX 4 MG Take 4 mg by mouth at bedtime as needed.        Zolpidem Tartrate (Tab) AMBIEN 10 MG Take 1 Tab by mouth at bedtime as needed for Sleep.        .                 Medicines prescribed today were sent to:     [facility]/PHARMACY #[id number] - [place] - [address]    [address] [place] [address]    Phone: [phone] Fax: [phone]    Open 24 Hours?: No    [id number] - [place] - [address] [address] AT [facility] & [facility]    [address] [place] [address]    Phone: [phone] Fax: [phone]    Open 24 Hours?: No      Medication refill instructions:       If your prescription bottle indicates you have medication refills left, it is not necessary to call your provider’s office. Please contact your pharmacy and they will refill your medication.    If your prescription bottle indicates you do not have any refills left, you may request refills at any time through one of the following ways: The online [employer] system (except Urgent Care), by calling your provider’s office, or by asking your pharmacy to contact your provider’s office with a refill request. Medication refills are processed only during regular business hours and may not be available until the next business day. Your provider may request additional information or to have a follow-up visit with you prior to refilling your medication.   *Please Note: Medication refills are assigned a new Rx number when refilled electronically. Your pharmacy may indicate that no refills were authorized even though a new prescription for the same medication is available at the pharmacy. Please request the medicine by name with the pharmacy before contacting your provider for a refill.        Instructions    The side effects of Acupuncture needle insertion include: minor bruising, bleeding, or pain at the site of needle insertion.  If more worrisome symptoms, such as continued bleeding, severe bruising, or continue pain or altered sensation persist, please contact [facility] office @ [phone]         Other Notes About Your Plan     [facility]  Pain Management: Dr. Woody Gill Onc: Wan Rincon  Onc: Dr. Tra Haq Access Code: Activation code not generated  Current [employer] Status: Active

## 2017-05-15 NOTE — PATIENT INSTRUCTIONS
The side effects of Acupuncture needle insertion include: minor bruising, bleeding, or pain at the site of needle insertion.  If more worrisome symptoms, such as continued bleeding, severe bruising, or continue pain or altered sensation persist, please contact [facility] office @ [phone]

## 2017-05-15 NOTE — PROGRESS NOTES
"Subjective:      Adrianne Martinez is a 60 y.o. female who presents with No chief complaint on file.        [facility] Acupuncture Progress Note  [address] [address]  Dept: [phone]      Patient Name: Adrianne Martinez   MRN: [id number]  YOB: 1955  PCP: Cyrus Mcgowan M.D.  Date of Service: 5/15/2017 11:06 AM    CC Adrianne - h/o L breast cancer.  Right IT band and hip pain.   L Lumbago.  Insomnia.    Cervicalgia R.  Body aches   HPI She's achy throughout - She thinks that it's due to the estrogen blocker - Arimidex.  R hip hurts more than usual.      She'd like to speak to Dr. Dutta about whether the arimidex is causing body aches.      She slept better after acupuncture last week.      Neck and shoulders.  R hip.  Low back is achy as well.     R neck rash.  She's off the shampoo that was causing the issue in the first place.  Itchy.       ROS Birthplace: [place], [place], TIme Unknown - ? Born in the early evening per family  Color: Blue  Pain Management - [employer] [facility]  Season:Spring and fall for the activities she can do.  Right-handed  Scars: Left breast, L elbow  Has a dog - emotional support dog.  2 dogs.    She had a large weight loss  Doesn't like water  Sees Dr. Monet   [facility] Past Medical History   Diagnosis Date   • Postmenopausal HRT (hormone replacement therapy)    • Insomnia    • Chronic pelvic pain in female    • ENDOMETRIOSIS    • Recurrent UTI    • Family history of colon cancer    • Breast cancer (CMS-HCC)      left   • Endometriosis    • Colon polyp    • Gynecological disorder    • Heart burn    • Indigestion    • Snoring    • Jaundice 1967      r/t hepatitis    • Bowel habit changes      constipation    • Pneumonia 2006   • Cataract      bilat IOL   • Pain      right hip, neck, back    • Psychiatric problem      depression    • Anesthesia      \"heart stopped during colonoscopy\"    • Urine frequency    • Seizure (CMS-HCC)      last seizure 8/2016 (only had 2 " "seizures did not follow up with neuro, no meds)    • Cancer (CMS-HCC) 2017     left breast   • Hepatitis      as a child, does not know what kind \"was very sick, then I recovered\"      Past Surgical History   Procedure Laterality Date   • Lumpectomy  2007     Left - benign   • Other orthopedic surgery  1964     ORIF L elbow compound fx   • Breast biopsy       left breast   • Breast biopsy       left breast biopsy 2008 (benign)   • Laparoscopy  [id number]     multiple for endometriosis   • Cataract extraction with iol Bilateral    • Abdominal hysterectomy total  1999     for endometriosis   • Ovarian cystectomy  1985   • Mastectomy Left 2/8/2017     Procedure: MASTECTOMY - PARTIAL PLACEMENT OF CAVITY EVALUATION DEVICE;  Surgeon: Adriano Baird M.D.;  Location: SURGERY [facility];  Service:    • Node biopsy sentinel  2/8/2017     Procedure: NODE BIOPSY SENTINEL;  Surgeon: Adriano Baird M.D.;  Location: SURGERY [facility];  Service:    • Breast biopsy Left 2/16/2017     Procedure: BREAST BIOPSY-RE EXCISION FOR MARGINS ;  Surgeon: Adriano Baird M.D.;  Location: SURGERY [facility];  Service:       SH Social History     Social History   • Marital Status:      Spouse Name: N/A   • Number of Children: 2   • Years of Education: N/A     Social History Main Topics   • Smoking status: Never Smoker    • Smokeless tobacco: Never Used   • Alcohol Use: 0.6 oz/week     1 Glasses of wine per week      Comment: 1-3 per week    • Drug Use: No   • Sexual Activity:     Partners: Male     Other Topics Concern   • Not on file     Social History Narrative    Was previously an  - didn't like her job - didn't like conflict.        MEDS Current Outpatient Prescriptions on File Prior to Visit   Medication Sig Dispense Refill   • anastrozole (ARIMIDEX) 1 MG Tab Take 1 mg by mouth every day.     • zolpidem (AMBIEN) 10 MG Tab Take 1 Tab by mouth at bedtime as needed for Sleep. 15 Tab 2   • Cholecalciferol " (VITAMIN D3) 2000 UNITS Tab Take 1 Tab by mouth 2 Times a Day.     • hydrocodone-acetaminophen (NORCO) 5-325 MG Tab per tablet Take 1-2 Tabs by mouth every four hours as needed. 20 Tab 0   • duloxetine (CYMBALTA) 20 MG Cap DR Particles Take 20 mg by mouth every day.     • pregabalin (LYRICA) 25 MG Cap Take 25 mg by mouth every day.     • Omega-3 Fatty Acids (FISH OIL) 1000 MG Cap capsule Take 1,000 mg by mouth every day.     • CALCIUM & MAGNESIUM CARBONATES PO Take 1 Tab by mouth every day.     • DHEA 25 MG Tab Take 25 mg by mouth every day.     • cyanocobalamin (VITAMIN B-12) 100 MCG Tab Take 100 mcg by mouth every day.     • tizanidine (ZANAFLEX) 4 MG Tab Take 4 mg by mouth at bedtime as needed.       No current facility-administered medications on file prior to visit.      ALLERGIES Allergies   Allergen Reactions   • Contrast Media With Iodine [Iodine]      Malaise, weakness, inability to get up off table, mental status changes   • Cherry Rash   • Latex      rash   • Oxycontin [Oxycodone Hcl] Vomiting     Able to take vicodin.   • Demerol Vomiting   • Nsaids Rash   • Tape      Rash, blister       PE Tongue and pulse not examined today       Assessment [facility] Wood ([place]), ?surrender case  Stagnation Qi to BL/GB/ST, Jue Yin Jacobo yang axis    Western Encounter Diagnoses   Name Primary?   • Body aches    • History of breast cancer    • Primary insomnia    • Right sided sciatica    • Estrogen deficiency                   Plan Set 1: RLE Bl59-->+++Bl40 / GB39-->+++GB34 (non-alphastim)  Set 2: Ear Jiménez Men, Yin Parson, GV20,   Set 3: Nasrin White 3:6  Set 4: Hormone Tx 3:6     >15 min spent face to face, not including procedure.  >50% of the face to face time was spent in counseling and coordination. Topics discussed included:  Body ache consequences of Estrogen Receptor blockade - spoke to patient about the pros and cons of ER blockade.    Total acupuncture treatment time = 45 minutes      David Vieyra  MD

## 2017-05-21 ENCOUNTER — APPOINTMENT (OUTPATIENT)
Dept: RADIOLOGY | Facility: MEDICAL CENTER | Age: 62
DRG: 552 | End: 2017-05-21
Attending: EMERGENCY MEDICINE
Payer: COMMERCIAL

## 2017-05-21 ENCOUNTER — HOSPITAL ENCOUNTER (INPATIENT)
Facility: MEDICAL CENTER | Age: 62
LOS: 1 days | DRG: 552 | End: 2017-05-22
Attending: EMERGENCY MEDICINE | Admitting: SURGERY
Payer: COMMERCIAL

## 2017-05-21 ENCOUNTER — APPOINTMENT (OUTPATIENT)
Dept: RADIOLOGY | Facility: MEDICAL CENTER | Age: 62
DRG: 552 | End: 2017-05-21
Attending: NURSE PRACTITIONER
Payer: COMMERCIAL

## 2017-05-21 ENCOUNTER — APPOINTMENT (OUTPATIENT)
Dept: RADIOLOGY | Facility: MEDICAL CENTER | Age: 62
DRG: 552 | End: 2017-05-21
Attending: NEUROLOGICAL SURGERY
Payer: COMMERCIAL

## 2017-05-21 DIAGNOSIS — S32.001A LUMBAR BURST FRACTURE, CLOSED, INITIAL ENCOUNTER (HCC): ICD-10-CM

## 2017-05-21 DIAGNOSIS — J93.9 PNEUMOTHORAX, RIGHT: ICD-10-CM

## 2017-05-21 PROBLEM — G89.29 CHRONIC RIGHT HIP PAIN: Status: ACTIVE | Noted: 2017-05-21

## 2017-05-21 PROBLEM — M25.551 CHRONIC RIGHT HIP PAIN: Status: ACTIVE | Noted: 2017-05-21

## 2017-05-21 PROBLEM — S32.021A CLOSED STABLE BURST FRACTURE OF SECOND LUMBAR VERTEBRA (HCC): Status: ACTIVE | Noted: 2017-05-21

## 2017-05-21 LAB
ABO GROUP BLD: NORMAL
ALBUMIN SERPL BCP-MCNC: 4.2 G/DL (ref 3.2–4.9)
ALBUMIN/GLOB SERPL: 1.4 G/DL
ALP SERPL-CCNC: 65 U/L (ref 30–99)
ALT SERPL-CCNC: 25 U/L (ref 2–50)
ANION GAP SERPL CALC-SCNC: 8 MMOL/L (ref 0–11.9)
APTT PPP: 33.9 SEC (ref 24.7–36)
AST SERPL-CCNC: 41 U/L (ref 12–45)
BILIRUB SERPL-MCNC: 0.4 MG/DL (ref 0.1–1.5)
BLD GP AB SCN SERPL QL: NORMAL
BUN SERPL-MCNC: 12 MG/DL (ref 8–22)
CALCIUM SERPL-MCNC: 8.9 MG/DL (ref 8.5–10.5)
CHLORIDE SERPL-SCNC: 104 MMOL/L (ref 96–112)
CO2 SERPL-SCNC: 27 MMOL/L (ref 20–33)
CREAT SERPL-MCNC: 0.64 MG/DL (ref 0.5–1.4)
ERYTHROCYTE [DISTWIDTH] IN BLOOD BY AUTOMATED COUNT: 41.8 FL (ref 35.9–50)
ETHANOL BLD-MCNC: 0 G/DL
GFR SERPL CREATININE-BSD FRML MDRD: >60 ML/MIN/1.73 M 2
GLOBULIN SER CALC-MCNC: 3.1 G/DL (ref 1.9–3.5)
GLUCOSE BLD-MCNC: 114 MG/DL (ref 65–99)
GLUCOSE SERPL-MCNC: 107 MG/DL (ref 65–99)
HCG SERPL QL: NEGATIVE
HCT VFR BLD AUTO: 39.3 % (ref 37–47)
HGB BLD-MCNC: 12.9 G/DL (ref 12–16)
INR PPP: 0.92 (ref 0.87–1.13)
MCH RBC QN AUTO: 29.9 PG (ref 27–33)
MCHC RBC AUTO-ENTMCNC: 32.8 G/DL (ref 33.6–35)
MCV RBC AUTO: 91 FL (ref 81.4–97.8)
PLATELET # BLD AUTO: 244 K/UL (ref 164–446)
PMV BLD AUTO: 8.8 FL (ref 9–12.9)
POTASSIUM SERPL-SCNC: 4 MMOL/L (ref 3.6–5.5)
PROT SERPL-MCNC: 7.3 G/DL (ref 6–8.2)
PROTHROMBIN TIME: 12.6 SEC (ref 12–14.6)
RBC # BLD AUTO: 4.32 M/UL (ref 4.2–5.4)
RH BLD: NORMAL
SODIUM SERPL-SCNC: 139 MMOL/L (ref 135–145)
WBC # BLD AUTO: 5.2 K/UL (ref 4.8–10.8)

## 2017-05-21 PROCEDURE — 85027 COMPLETE CBC AUTOMATED: CPT

## 2017-05-21 PROCEDURE — 72100 X-RAY EXAM L-S SPINE 2/3 VWS: CPT

## 2017-05-21 PROCEDURE — 80053 COMPREHEN METABOLIC PANEL: CPT

## 2017-05-21 PROCEDURE — 86901 BLOOD TYPING SEROLOGIC RH(D): CPT

## 2017-05-21 PROCEDURE — 71010 DX-CHEST-PORTABLE (1 VIEW): CPT

## 2017-05-21 PROCEDURE — 700112 HCHG RX REV CODE 229: Performed by: NURSE PRACTITIONER

## 2017-05-21 PROCEDURE — 99285 EMERGENCY DEPT VISIT HI MDM: CPT

## 2017-05-21 PROCEDURE — 82962 GLUCOSE BLOOD TEST: CPT

## 2017-05-21 PROCEDURE — 72125 CT NECK SPINE W/O DYE: CPT

## 2017-05-21 PROCEDURE — 72128 CT CHEST SPINE W/O DYE: CPT

## 2017-05-21 PROCEDURE — A9270 NON-COVERED ITEM OR SERVICE: HCPCS | Performed by: NURSE PRACTITIONER

## 2017-05-21 PROCEDURE — 85730 THROMBOPLASTIN TIME PARTIAL: CPT

## 2017-05-21 PROCEDURE — 700105 HCHG RX REV CODE 258: Performed by: NURSE PRACTITIONER

## 2017-05-21 PROCEDURE — L0464 TLSO 4MOD SACRO-SCAP PRE: HCPCS

## 2017-05-21 PROCEDURE — 305948 HCHG GREEN TRAUMA ACT PRE-NOTIFY NO CC

## 2017-05-21 PROCEDURE — 700111 HCHG RX REV CODE 636 W/ 250 OVERRIDE (IP): Performed by: NURSE PRACTITIONER

## 2017-05-21 PROCEDURE — 71010 DX-CHEST-LIMITED (1 VIEW): CPT

## 2017-05-21 PROCEDURE — 700102 HCHG RX REV CODE 250 W/ 637 OVERRIDE(OP): Performed by: NURSE PRACTITIONER

## 2017-05-21 PROCEDURE — 700111 HCHG RX REV CODE 636 W/ 250 OVERRIDE (IP): Performed by: EMERGENCY MEDICINE

## 2017-05-21 PROCEDURE — 84703 CHORIONIC GONADOTROPIN ASSAY: CPT

## 2017-05-21 PROCEDURE — 70450 CT HEAD/BRAIN W/O DYE: CPT

## 2017-05-21 PROCEDURE — 80307 DRUG TEST PRSMV CHEM ANLYZR: CPT

## 2017-05-21 PROCEDURE — 86850 RBC ANTIBODY SCREEN: CPT

## 2017-05-21 PROCEDURE — L0458 TLSO 2MOD SYMPHIS-XIPHO PRE: HCPCS

## 2017-05-21 PROCEDURE — 86900 BLOOD TYPING SEROLOGIC ABO: CPT

## 2017-05-21 PROCEDURE — 72131 CT LUMBAR SPINE W/O DYE: CPT

## 2017-05-21 PROCEDURE — 96374 THER/PROPH/DIAG INJ IV PUSH: CPT

## 2017-05-21 PROCEDURE — 85610 PROTHROMBIN TIME: CPT

## 2017-05-21 PROCEDURE — 74176 CT ABD & PELVIS W/O CONTRAST: CPT

## 2017-05-21 PROCEDURE — 770001 HCHG ROOM/CARE - MED/SURG/GYN PRIV*

## 2017-05-21 RX ORDER — FAMOTIDINE 20 MG/1
20 TABLET, FILM COATED ORAL 2 TIMES DAILY
Status: DISCONTINUED | OUTPATIENT
Start: 2017-05-21 | End: 2017-05-22 | Stop reason: HOSPADM

## 2017-05-21 RX ORDER — HYDROCODONE BITARTRATE AND ACETAMINOPHEN 5; 325 MG/1; MG/1
1 TABLET ORAL 3 TIMES DAILY PRN
Status: ON HOLD | COMMUNITY
End: 2017-05-22

## 2017-05-21 RX ORDER — ZOLPIDEM TARTRATE 10 MG/1
10 TABLET ORAL NIGHTLY PRN
Status: ON HOLD | COMMUNITY
End: 2017-05-22

## 2017-05-21 RX ORDER — DEXTROSE MONOHYDRATE 25 G/50ML
25 INJECTION, SOLUTION INTRAVENOUS
Status: DISCONTINUED | OUTPATIENT
Start: 2017-05-21 | End: 2017-05-22 | Stop reason: HOSPADM

## 2017-05-21 RX ORDER — AMOXICILLIN 250 MG
1 CAPSULE ORAL NIGHTLY
Status: DISCONTINUED | OUTPATIENT
Start: 2017-05-21 | End: 2017-05-22 | Stop reason: HOSPADM

## 2017-05-21 RX ORDER — TIZANIDINE 4 MG/1
4 TABLET ORAL
Status: ON HOLD | COMMUNITY
End: 2017-05-22

## 2017-05-21 RX ORDER — PREGABALIN 25 MG/1
25 CAPSULE ORAL EVERY MORNING
Status: DISCONTINUED | OUTPATIENT
Start: 2017-05-22 | End: 2017-05-22 | Stop reason: HOSPADM

## 2017-05-21 RX ORDER — BISACODYL 10 MG
10 SUPPOSITORY, RECTAL RECTAL
Status: DISCONTINUED | OUTPATIENT
Start: 2017-05-21 | End: 2017-05-22 | Stop reason: HOSPADM

## 2017-05-21 RX ORDER — POLYETHYLENE GLYCOL 3350 17 G/17G
1 POWDER, FOR SOLUTION ORAL 2 TIMES DAILY
Status: DISCONTINUED | OUTPATIENT
Start: 2017-05-21 | End: 2017-05-22 | Stop reason: HOSPADM

## 2017-05-21 RX ORDER — DULOXETIN HYDROCHLORIDE 30 MG/1
30 CAPSULE, DELAYED RELEASE ORAL
Status: DISCONTINUED | OUTPATIENT
Start: 2017-05-21 | End: 2017-05-22 | Stop reason: HOSPADM

## 2017-05-21 RX ORDER — CYANOCOBALAMIN (VITAMIN B-12) 5000 MCG
5000 TABLET,DISINTEGRATING ORAL EVERY MORNING
COMMUNITY
End: 2021-06-28

## 2017-05-21 RX ORDER — AMOXICILLIN 250 MG
1 CAPSULE ORAL
Status: DISCONTINUED | OUTPATIENT
Start: 2017-05-21 | End: 2017-05-22 | Stop reason: HOSPADM

## 2017-05-21 RX ORDER — OXYCODONE HYDROCHLORIDE 10 MG/1
10 TABLET ORAL
Status: DISCONTINUED | OUTPATIENT
Start: 2017-05-21 | End: 2017-05-22 | Stop reason: HOSPADM

## 2017-05-21 RX ORDER — ACETAMINOPHEN 650 MG/1
650 SUPPOSITORY RECTAL EVERY 4 HOURS PRN
Status: DISCONTINUED | OUTPATIENT
Start: 2017-05-21 | End: 2017-05-22 | Stop reason: HOSPADM

## 2017-05-21 RX ORDER — SODIUM CHLORIDE 9 MG/ML
INJECTION, SOLUTION INTRAVENOUS CONTINUOUS
Status: DISCONTINUED | OUTPATIENT
Start: 2017-05-21 | End: 2017-05-22

## 2017-05-21 RX ORDER — M-VIT,TX,IRON,MINS/CALC/FOLIC 27MG-0.4MG
1 TABLET ORAL DAILY
Status: ON HOLD | COMMUNITY
End: 2017-05-22

## 2017-05-21 RX ORDER — MORPHINE SULFATE 4 MG/ML
1-4 INJECTION, SOLUTION INTRAMUSCULAR; INTRAVENOUS
Status: DISCONTINUED | OUTPATIENT
Start: 2017-05-21 | End: 2017-05-22 | Stop reason: HOSPADM

## 2017-05-21 RX ORDER — MULTIVIT WITH MINERALS/LUTEIN
1000 TABLET ORAL EVERY EVENING
COMMUNITY

## 2017-05-21 RX ORDER — MORPHINE SULFATE 4 MG/ML
4 INJECTION, SOLUTION INTRAMUSCULAR; INTRAVENOUS ONCE
Status: COMPLETED | OUTPATIENT
Start: 2017-05-21 | End: 2017-05-21

## 2017-05-21 RX ORDER — DOCUSATE SODIUM 100 MG/1
100 CAPSULE, LIQUID FILLED ORAL 2 TIMES DAILY
Status: DISCONTINUED | OUTPATIENT
Start: 2017-05-21 | End: 2017-05-22 | Stop reason: HOSPADM

## 2017-05-21 RX ORDER — DULOXETIN HYDROCHLORIDE 30 MG/1
30 CAPSULE, DELAYED RELEASE ORAL
COMMUNITY
End: 2021-06-25

## 2017-05-21 RX ORDER — OXYCODONE HYDROCHLORIDE 10 MG/1
5 TABLET ORAL
Status: DISCONTINUED | OUTPATIENT
Start: 2017-05-21 | End: 2017-05-22 | Stop reason: HOSPADM

## 2017-05-21 RX ORDER — ENEMA 19; 7 G/133ML; G/133ML
1 ENEMA RECTAL
Status: DISCONTINUED | OUTPATIENT
Start: 2017-05-21 | End: 2017-05-22 | Stop reason: HOSPADM

## 2017-05-21 RX ORDER — ONDANSETRON 2 MG/ML
4 INJECTION INTRAMUSCULAR; INTRAVENOUS EVERY 4 HOURS PRN
Status: DISCONTINUED | OUTPATIENT
Start: 2017-05-21 | End: 2017-05-22 | Stop reason: HOSPADM

## 2017-05-21 RX ORDER — ACETAMINOPHEN 325 MG/1
650 TABLET ORAL EVERY 4 HOURS PRN
Status: DISCONTINUED | OUTPATIENT
Start: 2017-05-21 | End: 2017-05-22 | Stop reason: HOSPADM

## 2017-05-21 RX ORDER — ANASTROZOLE 1 MG/1
1 TABLET ORAL DAILY
COMMUNITY
End: 2018-02-01

## 2017-05-21 RX ORDER — CHLORHEXIDINE GLUCONATE ORAL RINSE 1.2 MG/ML
15 SOLUTION DENTAL EVERY 12 HOURS
Status: DISCONTINUED | OUTPATIENT
Start: 2017-05-21 | End: 2017-05-21

## 2017-05-21 RX ORDER — PREGABALIN 25 MG/1
50 CAPSULE ORAL
COMMUNITY

## 2017-05-21 RX ORDER — CHLORAL HYDRATE 500 MG
1000 CAPSULE ORAL DAILY
Status: ON HOLD | COMMUNITY
End: 2017-05-22

## 2017-05-21 RX ADMIN — MORPHINE SULFATE 4 MG: 4 INJECTION INTRAVENOUS at 21:41

## 2017-05-21 RX ADMIN — DOCUSATE SODIUM 100 MG: 100 CAPSULE ORAL at 21:00

## 2017-05-21 RX ADMIN — STANDARDIZED SENNA CONCENTRATE AND DOCUSATE SODIUM 1 TABLET: 8.6; 5 TABLET, FILM COATED ORAL at 21:41

## 2017-05-21 RX ADMIN — SODIUM CHLORIDE: 9 INJECTION, SOLUTION INTRAVENOUS at 20:30

## 2017-05-21 RX ADMIN — FAMOTIDINE 20 MG: 20 TABLET, FILM COATED ORAL at 21:41

## 2017-05-21 RX ADMIN — DULOXETINE HYDROCHLORIDE 30 MG: 30 CAPSULE, DELAYED RELEASE ORAL at 21:41

## 2017-05-21 RX ADMIN — POLYETHYLENE GLYCOL 3350 1 PACKET: 17 POWDER, FOR SOLUTION ORAL at 21:42

## 2017-05-21 RX ADMIN — MORPHINE SULFATE 4 MG: 4 INJECTION INTRAVENOUS at 18:41

## 2017-05-21 RX ADMIN — OXYCODONE HYDROCHLORIDE 10 MG: 10 TABLET ORAL at 21:22

## 2017-05-21 ASSESSMENT — PAIN SCALES - GENERAL
PAINLEVEL_OUTOF10: 6
PAINLEVEL_OUTOF10: 6
PAINLEVEL_OUTOF10: 5

## 2017-05-21 NOTE — IP AVS SNAPSHOT
" <p align=\"LEFT\"><IMG SRC=\"//[email or web address]\" alt=\"Image\" WIDTH=\"50%\" HEIGHT=\"200\" BORDER=\"\"></p>                   Name:Adrianne Martinez  Medical Record Number:[id number]  CSN: [id number]    YOB: 1955   Age: 61 y.o.  Sex: female  HT:1.575 m (5' 2\") WT: 48.3 kg (106 lb 7.7 oz)          Admit Date: 5/21/2017     Discharge Date:   Today's Date: 5/22/2017  Attending Doctor:  Amandeep Hoff M.D.                  Allergies:  Cherry; Contrast media with iodine; Meperidine; and Tape          Follow-up Information     1. Follow up with Scott Mireles M.D.. Schedule an appointment as soon as possible for a visit in 3 months.    Specialty:  Neurosurgery    Why:  For follow up, With a CT scan of your lumbar without contrast.    Contact information    [address]  [address]  [facility] [id number] [phone]           Medication List      Take these Medications        Instructions    anastrozole 1 MG Tabs   Commonly known as:  ARIMIDEX    Take 1 mg by mouth every day.   Dose:  1 mg       ascorbic acid 500 MG Tabs   Commonly known as:  ascorbic acid    Take 500 mg by mouth every day.   Dose:  500 mg       cyanocobalamin 500 MCG Tabs   Commonly known as:  VITAMIN B-12    Take 500 mcg by mouth every day.   Dose:  500 mcg       duloxetine 30 MG Cpep   What changed:  Another medication with the same name was removed. Continue taking this medication, and follow the directions you see here.   Commonly known as:  CYMBALTA    Take 30 mg by mouth every bedtime.   Dose:  30 mg       oxycodone immediate-release 5 MG Tabs   Commonly known as:  ROXICODONE    Take 1 Tab by mouth every 3 hours as needed for Moderate Pain ((NRS Pain Scale 4-6; CPOT Pain Scale 3-5)).   Dose:  5 mg       pregabalin 25 MG Caps   Commonly known as:  LYRICA    Take 25 mg by mouth every morning.   Dose:  25 mg       vitamin D 1000 UNIT Tabs   Commonly known as:  cholecalciferol    Take 1,000 Units by mouth every day.   Dose:  1000 " Units

## 2017-05-21 NOTE — IP AVS SNAPSHOT
5/22/2017    Adrianne Martinez  [address]  [place] [address]    Dear Adrianne:    [facility] wants to ensure your discharge home is safe and you or your loved ones have had all of your questions answered regarding your care after you leave the hospital.    Below is a list of resources and contact information should you have any questions regarding your hospital stay, follow-up instructions, or active medical symptoms.    Questions or Concerns Regarding… Contact   Medical Questions Related to Your Discharge  (7 days a week, 8am-5pm) Contact a Nurse Care Coordinator   [phone]   Medical Questions Not Related to Your Discharge  (24 hours a day / 7 days a week)  Contact the Nurse Health Line   [phone]    Medications or Discharge Instructions Refer to your discharge packet   or contact your [facility] Primary Care Provider   [phone]   Follow-up Appointment(s) Schedule your appointment via [employer]   or contact Scheduling [phone]   Billing Review your statement via [employer]  or contact Billing [phone]   Medical Records Review your records via [employer]   or contact Medical Records [phone]     You may receive a telephone call within two days of discharge. This call is to make certain you understand your discharge instructions and have the opportunity to have any questions answered. You can also easily access your medical information, test results and upcoming appointments via the [employer] free online health management tool. You can learn more and sign up at [email or web address]. For assistance setting up your [employer] account, please call [phone].    Once again, we want to ensure your discharge home is safe and that you have a clear understanding of any next steps in your care. If you have any questions or concerns, please do not hesitate to contact us, we are here for you. Thank you for choosing [facility] for your healthcare needs.    Sincerely,    Your [facility] Healthcare Team

## 2017-05-21 NOTE — IP AVS SNAPSHOT
[employer] Access Code: [email or web address]  Expires: 6/21/2017  6:20 PM    [employer]  A secure, online tool to manage your health information     [employer]’s [employer]® is a secure, online tool that connects you to your personalized health information from the privacy of your home -- day or night - making it very easy for you to manage your healthcare. Once the activation process is completed, you can even access your medical information using the [employer] giancarlo, which is available for free in the Apple Giancarlo store or Google Play store.     [employer] provides the following levels of access (as shown below):   My Chart Features   [facility] Primary Care Doctor [facility]  Specialists [facility]  Urgent  Care Non-[facility]  Primary Care  Doctor   Email your healthcare team securely and privately 24/7 X X X X   Manage appointments: schedule your next appointment; view details of past/upcoming appointments X      Request prescription refills. X      View recent personal medical records, including lab and immunizations X X X X   View health record, including health history, allergies, medications X X X X   Read reports about your outpatient visits, procedures, consult and ER notes X X X X   See your discharge summary, which is a recap of your hospital and/or ER visit that includes your diagnosis, lab results, and care plan. X X       How to register for [employer]:  1. Go to  [email or web address].  2. Click on the Sign Up Now box, which takes you to the New Member Sign Up page. You will need to provide the following information:  a. Enter your [employer] Access Code exactly as it appears at the top of this page. (You will not need to use this code after you’ve completed the sign-up process. If you do not sign up before the expiration date, you must request a new code.)   b. Enter your date of birth.   c. Enter your home email address.   d. Click Submit, and follow the next screen’s instructions.  3. Create a [employer] ID. This will be your [employer]  login ID and cannot be changed, so think of one that is secure and easy to remember.  4. Create a [employer] password. You can change your password at any time.  5. Enter your Password Reset Question and Answer. This can be used at a later time if you forget your password.   6. Enter your e-mail address. This allows you to receive e-mail notifications when new information is available in [employer].  7. Click Sign Up. You can now view your health information.    For assistance activating your [employer] account, call [phone]

## 2017-05-21 NOTE — IP AVS SNAPSHOT
" Home Care Instructions                                                                                                                  Name:Adrianne Martinez  Medical Record Number:[id number]  CSN: [id number]    YOB: 1955   Age: 61 y.o.  Sex: female  HT:1.575 m (5' 2\") WT: 48.3 kg (106 lb 7.7 oz)          Admit Date: 5/21/2017     Discharge Date:   Today's Date: 5/22/2017  Attending Doctor:  Amandeep Hoff M.D.                  Allergies:  Cherry; Contrast media with iodine; Meperidine; and Tape            Discharge Instructions       Discharge Instructions    Discharged to home by car with relative. Discharged via wheelchair, hospital escort: Yes.  Special equipment needed: TLSO    Be sure to schedule a follow-up appointment with your primary care doctor or any specialists as instructed.     Discharge Plan:     Follow PT and OT instructions on restrictions.  Wear TLSO brace as directed.  Call Dr. Mireles's office to set up an appointment for 3 months with a CT scan of the lumbar with no contrast. [phone].    Diet Plan: Discussed  Activity Level: Discussed  Confirmed Follow up Appointment: Patient to Call and Schedule Appointment  Confirmed Symptoms Management: Discussed  Medication Reconciliation Updated: Yes  Influenza Vaccine Indication: Not indicated: Previously immunized this influenza season and > 8 years of age    I understand that a diet low in cholesterol, fat, and sodium is recommended for good health. Unless I have been given specific instructions below for another diet, I accept this instruction as my diet prescription.   Other diet: Regular    Special Instructions: None    · Is patient discharged on Warfarin / Coumadin?   No     · Is patient Post Blood Transfusion?  No    Depression / Suicide Risk    As you are discharged from this [facility] facility, it is important to learn how to keep safe from harming yourself.    Recognize the warning signs:  · Abrupt changes in personality, " positive or negative- including increase in energy   · Giving away possessions  · Change in eating patterns- significant weight changes-  positive or negative  · Change in sleeping patterns- unable to sleep or sleeping all the time   · Unwillingness or inability to communicate  · Depression  · Unusual sadness, discouragement and loneliness  · Talk of wanting to die  · Neglect of personal appearance   · Rebelliousness- reckless behavior  · Withdrawal from people/activities they love  · Confusion- inability to concentrate     If you or a loved one observes any of these behaviors or has concerns about self-harm, here's what you can do:  · Talk about it- your feelings and reasons for harming yourself  · Remove any means that you might use to hurt yourself (examples: pills, rope, extension cords, firearm)  · Get professional help from the community (Mental Health, Substance Abuse, psychological counseling)  · Do not be alone:Call your Safe Contact- someone whom you trust who will be there for you.  · Call your local CRISIS HOTLINE 544-8968 or 796-680-5414  · Call your local Children's Mobile Crisis Response Team [place] (912) 288-7833 or wwwL[employer]igent Technologies  · Call the toll free National Suicide Prevention Hotlines   · National Suicide Prevention Lifeline 665-560-UCLU (1545)  · National Hope Line Network 800-SUICIDE (885-3074)        Follow-up Information     1. Follow up with Scott Mireles M.D.. Schedule an appointment as soon as possible for a visit in 3 months.    Specialty:  Neurosurgery    Why:  For follow up, With a CT scan of your lumbar without contrast.    Contact information    [address] [facility]  [address]  [facility] [id number] [phone]           Discharge Medication Instructions:    Below are the medications your physician expects you to take upon discharge:    Review all your home medications and newly ordered medications with your doctor and/or pharmacist. Follow medication instructions as  directed by your doctor and/or pharmacist.    Please keep your medication list with you and share with your physician.               Medication List      START taking these medications        Instructions    Morning Afternoon Evening Bedtime    oxycodone immediate-release 5 MG Tabs   Last time this was given:  10 mg on 5/22/2017  2:28 PM   Commonly known as:  ROXICODONE        Take 1 Tab by mouth every 3 hours as needed for Moderate Pain ((NRS Pain Scale 4-6; CPOT Pain Scale 3-5)).   Dose:  5 mg                          CHANGE how you take these medications        Instructions    Morning Afternoon Evening Bedtime    duloxetine 30 MG Cpep   What changed:  Another medication with the same name was removed. Continue taking this medication, and follow the directions you see here.   Last time this was given:  30 mg on 5/21/2017  9:41 PM   Commonly known as:  CYMBALTA        Take 30 mg by mouth every bedtime.   Dose:  30 mg                          CONTINUE taking these medications        Instructions    Morning Afternoon Evening Bedtime    anastrozole 1 MG Tabs   Commonly known as:  ARIMIDEX        Take 1 mg by mouth every day.   Dose:  1 mg                        ascorbic acid 500 MG Tabs   Commonly known as:  ascorbic acid        Take 500 mg by mouth every day.   Dose:  500 mg                        cyanocobalamin 500 MCG Tabs   Commonly known as:  VITAMIN B-12        Take 500 mcg by mouth every day.   Dose:  500 mcg                        pregabalin 25 MG Caps   Last time this was given:  25 mg on 5/22/2017  8:48 AM   Commonly known as:  LYRICA        Take 25 mg by mouth every morning.   Dose:  25 mg                        vitamin D 1000 UNIT Tabs   Commonly known as:  cholecalciferol        Take 1,000 Units by mouth every day.   Dose:  1000 Units                          STOP taking these medications     fish oil 1000 MG Caps capsule               hydrocodone-acetaminophen 5-325 MG Tabs per tablet   Commonly known  as:  NORCO               therapeutic multivitamin-minerals Tabs               tizanidine 4 MG Tabs   Commonly known as:  ZANAFLEX               zolpidem 10 MG Tabs   Commonly known as:  AMBIEN                    Where to Get Your Medications      Information about where to get these medications is not yet available     ! Ask your nurse or doctor about these medications    - oxycodone immediate-release 5 MG Tabs            Instructions           Diet / Nutrition:    Follow any diet instructions given to you by your doctor or the dietician, including how much salt (sodium) you are allowed each day.    If you are overweight, talk to your doctor about a weight reduction plan.    Activity:    Remain physically active following your doctor's instructions about exercise and activity.    Rest often.     Any time you become even a little tired or short of breath, SIT DOWN and rest.    Worsening Symptoms:    Report any of the following signs and symptoms to the doctor's office immediately:    *Pain of jaw, arm, or neck  *Chest pain not relieved by medication                               *Dizziness or loss of consciousness  *Difficulty breathing even when at rest   *More tired than usual                                       *Bleeding drainage or swelling of surgical site  *Swelling of feet, ankles, legs or stomach                 *Fever (>100ºF)  *Pink or blood tinged sputum  *Weight gain (3lbs/day or 5lbs /week)           *Shock from internal defibrillator (if applicable)  *Palpitations or irregular heartbeats                *Cool and/or numb extremities    Stroke Awareness    Common Risk Factors for Stroke include:    Age  Atrial Fibrillation  Carotid Artery Stenosis  Diabetes Mellitus  Excessive alcohol consumption  High blood pressure  Overweight   Physical inactivity  Smoking    Warning signs and symptoms of a stroke include:    *Sudden numbness or weakness of the face, arm or leg (especially on one side of the  body).  *Sudden confusion, trouble speaking or understanding.  *Sudden trouble seeing in one or both eyes.  *Sudden trouble walking, dizziness, loss of balance or coordination.Sudden severe headache with no known cause.    It is very important to get treatment quickly when a stroke occurs. If you experience any of the above warning signs, call 911 immediately.                   Disclaimer         Quit Smoking / Tobacco Use:    I understand the use of any tobacco products increases my chance of suffering from future heart disease or stroke and could cause other illnesses which may shorten my life. Quitting the use of tobacco products is the single most important thing I can do to improve my health. For further information on smoking / tobacco cessation call a Toll Free Quit Line at 1-491.553.1015 (*National Cancer [place]) or 1-784.267.4085 (American Lung Association) or you can access the web based program at www.lungusa.org.    [place] Tobacco Users Help Line:  (743) 606-7864       Toll Free: 1-576.909.3518  Quit Tobacco Program [facility] (319)523-4451    Crisis Hotline:    [place] Crisis Hotline:  4-172-RYMZSPK or 1-987.238.8576    [place] Crisis Hotline:    1-517.717.5636 or 288-083-7355    Discharge Survey:   Thank you for choosing [facility]. We hope we did everything we could to make your hospital stay a pleasant one. You may be receiving a phone survey and we would appreciate your time and participation in answering the questions. Your input is very valuable to us in our efforts to improve our service to our patients and their families.        My signature on this form indicates that:    1. I have reviewed and understand the above information.  2. My questions regarding this information have been answered to my satisfaction.  3. I have formulated a plan with my discharge nurse to obtain my prescribed medications for home.                  Disclaimer          __________________________________                     __________       ________                       Patient Signature                                                 Date                    Time

## 2017-05-22 ENCOUNTER — APPOINTMENT (OUTPATIENT)
Dept: RADIOLOGY | Facility: MEDICAL CENTER | Age: 62
DRG: 552 | End: 2017-05-22
Attending: NEUROLOGICAL SURGERY
Payer: COMMERCIAL

## 2017-05-22 ENCOUNTER — APPOINTMENT (OUTPATIENT)
Dept: RADIOLOGY | Facility: MEDICAL CENTER | Age: 62
DRG: 552 | End: 2017-05-22
Attending: SURGERY
Payer: COMMERCIAL

## 2017-05-22 ENCOUNTER — APPOINTMENT (OUTPATIENT)
Dept: RADIOLOGY | Facility: MEDICAL CENTER | Age: 62
DRG: 552 | End: 2017-05-22
Attending: PHYSICIAN ASSISTANT
Payer: COMMERCIAL

## 2017-05-22 ENCOUNTER — APPOINTMENT (OUTPATIENT)
Dept: RADIOLOGY | Facility: MEDICAL CENTER | Age: 62
DRG: 552 | End: 2017-05-22
Attending: NURSE PRACTITIONER
Payer: COMMERCIAL

## 2017-05-22 VITALS
WEIGHT: 106.48 LBS | TEMPERATURE: 97.8 F | HEART RATE: 56 BPM | HEIGHT: 62 IN | DIASTOLIC BLOOD PRESSURE: 95 MMHG | SYSTOLIC BLOOD PRESSURE: 148 MMHG | RESPIRATION RATE: 16 BRPM | OXYGEN SATURATION: 98 % | BODY MASS INDEX: 19.6 KG/M2

## 2017-05-22 LAB
ABO GROUP BLD: NORMAL
GLUCOSE BLD-MCNC: 101 MG/DL (ref 65–99)
GLUCOSE BLD-MCNC: 110 MG/DL (ref 65–99)
GLUCOSE BLD-MCNC: 122 MG/DL (ref 65–99)

## 2017-05-22 PROCEDURE — G8988 SELF CARE GOAL STATUS: HCPCS | Mod: CI

## 2017-05-22 PROCEDURE — 94760 N-INVAS EAR/PLS OXIMETRY 1: CPT

## 2017-05-22 PROCEDURE — 72070 X-RAY EXAM THORAC SPINE 2VWS: CPT

## 2017-05-22 PROCEDURE — 51798 US URINE CAPACITY MEASURE: CPT

## 2017-05-22 PROCEDURE — 700111 HCHG RX REV CODE 636 W/ 250 OVERRIDE (IP): Performed by: NURSE PRACTITIONER

## 2017-05-22 PROCEDURE — G8978 MOBILITY CURRENT STATUS: HCPCS | Mod: CJ

## 2017-05-22 PROCEDURE — 700102 HCHG RX REV CODE 250 W/ 637 OVERRIDE(OP): Performed by: NURSE PRACTITIONER

## 2017-05-22 PROCEDURE — 97166 OT EVAL MOD COMPLEX 45 MIN: CPT

## 2017-05-22 PROCEDURE — 36415 COLL VENOUS BLD VENIPUNCTURE: CPT

## 2017-05-22 PROCEDURE — 97162 PT EVAL MOD COMPLEX 30 MIN: CPT

## 2017-05-22 PROCEDURE — 72100 X-RAY EXAM L-S SPINE 2/3 VWS: CPT

## 2017-05-22 PROCEDURE — G8979 MOBILITY GOAL STATUS: HCPCS | Mod: CI

## 2017-05-22 PROCEDURE — G8987 SELF CARE CURRENT STATUS: HCPCS | Mod: CJ

## 2017-05-22 PROCEDURE — 700112 HCHG RX REV CODE 229: Performed by: NURSE PRACTITIONER

## 2017-05-22 PROCEDURE — 82962 GLUCOSE BLOOD TEST: CPT

## 2017-05-22 PROCEDURE — 71010 DX-CHEST-PORTABLE (1 VIEW): CPT

## 2017-05-22 PROCEDURE — A9270 NON-COVERED ITEM OR SERVICE: HCPCS | Performed by: NURSE PRACTITIONER

## 2017-05-22 RX ORDER — OXYCODONE HYDROCHLORIDE 5 MG/1
5 TABLET ORAL
Qty: 30 TAB | Refills: 0 | Status: SHIPPED | OUTPATIENT
Start: 2017-05-22 | End: 2018-03-30

## 2017-05-22 RX ADMIN — OXYCODONE HYDROCHLORIDE 10 MG: 10 TABLET ORAL at 08:53

## 2017-05-22 RX ADMIN — OXYCODONE HYDROCHLORIDE 10 MG: 10 TABLET ORAL at 14:28

## 2017-05-22 RX ADMIN — DOCUSATE SODIUM 100 MG: 100 CAPSULE ORAL at 08:48

## 2017-05-22 RX ADMIN — ONDANSETRON 4 MG: 2 INJECTION INTRAMUSCULAR; INTRAVENOUS at 14:58

## 2017-05-22 RX ADMIN — ONDANSETRON 4 MG: 2 INJECTION INTRAMUSCULAR; INTRAVENOUS at 06:03

## 2017-05-22 RX ADMIN — MORPHINE SULFATE 4 MG: 4 INJECTION INTRAVENOUS at 06:03

## 2017-05-22 RX ADMIN — PREGABALIN 25 MG: 25 CAPSULE ORAL at 08:48

## 2017-05-22 RX ADMIN — FAMOTIDINE 20 MG: 20 TABLET, FILM COATED ORAL at 08:48

## 2017-05-22 RX ADMIN — POLYETHYLENE GLYCOL 3350 1 PACKET: 17 POWDER, FOR SOLUTION ORAL at 08:47

## 2017-05-22 RX ADMIN — OXYCODONE HYDROCHLORIDE 10 MG: 10 TABLET ORAL at 00:49

## 2017-05-22 RX ADMIN — MAGNESIUM HYDROXIDE 30 ML: 400 SUSPENSION ORAL at 08:47

## 2017-05-22 ASSESSMENT — LIFESTYLE VARIABLES
EVER_SMOKED: NEVER
EVER_SMOKED: NEVER
DO YOU DRINK ALCOHOL: NO

## 2017-05-22 ASSESSMENT — ENCOUNTER SYMPTOMS
BLURRED VISION: 0
HEADACHES: 0
DOUBLE VISION: 0
MYALGIAS: 1
COUGH: 0
SHORTNESS OF BREATH: 0
CHILLS: 0
ABDOMINAL PAIN: 0
FEVER: 0
NAUSEA: 0
TINGLING: 0
SENSORY CHANGE: 0
FOCAL WEAKNESS: 0
DIZZINESS: 0
VOMITING: 0
NECK PAIN: 1
BACK PAIN: 1

## 2017-05-22 ASSESSMENT — PAIN SCALES - GENERAL
PAINLEVEL_OUTOF10: 4

## 2017-05-22 ASSESSMENT — COPD QUESTIONNAIRES
COPD SCREENING SCORE: 0
DO YOU EVER COUGH UP ANY MUCUS OR PHLEGM?: NO/ONLY WITH OCCASIONAL COLDS OR INFECTIONS
DURING THE PAST 4 WEEKS HOW MUCH DID YOU FEEL SHORT OF BREATH: NONE/LITTLE OF THE TIME
HAVE YOU SMOKED AT LEAST 100 CIGARETTES IN YOUR ENTIRE LIFE: NO/DON'T KNOW

## 2017-05-22 ASSESSMENT — COGNITIVE AND FUNCTIONAL STATUS - GENERAL
DRESSING REGULAR UPPER BODY CLOTHING: A LITTLE
TURNING FROM BACK TO SIDE WHILE IN FLAT BAD: A LITTLE
STANDING UP FROM CHAIR USING ARMS: A LITTLE
DAILY ACTIVITIY SCORE: 21
DRESSING REGULAR LOWER BODY CLOTHING: A LITTLE
SUGGESTED CMS G CODE MODIFIER DAILY ACTIVITY: CJ
SUGGESTED CMS G CODE MODIFIER MOBILITY: CJ
CLIMB 3 TO 5 STEPS WITH RAILING: A LITTLE
MOBILITY SCORE: 20
MOVING TO AND FROM BED TO CHAIR: A LITTLE
HELP NEEDED FOR BATHING: A LITTLE

## 2017-05-22 ASSESSMENT — ACTIVITIES OF DAILY LIVING (ADL): TOILETING: INDEPENDENT

## 2017-05-22 ASSESSMENT — GAIT ASSESSMENTS
DISTANCE (FEET): 250
DEVIATION: BRADYKINETIC
GAIT LEVEL OF ASSIST: SUPERVISED

## 2017-05-22 NOTE — CARE PLAN
Problem: Communication  Goal: The ability to communicate needs accurately and effectively will improve  Outcome: MET Date Met:  05/22/17

## 2017-05-22 NOTE — THERAPY
"Physical Therapy Evaluation completed.   Bed Mobility:  Supine to Sit: Contact Guard Assist (heavy use of railing)  Transfers: Sit to Stand: Supervised  Gait: Level Of Assist: Supervised with No Equipment Needed       Plan of Care: Will benefit from Physical Therapy 2 times per week  Discharge Recommendations: Equipment: No Equipment Needed unless identified by OT; discussed obtaining a pulse oximeter   Pt presents with impaired activity tolerance, pain, nausea and hypoxia s/p L2 burst fracture and pneuomo. Pt is most limited by pain and nausea at this session but functionally mobilizing at a level okay for home d/c when medically appropriate to do so. Pt was hypoxic via peripheral read 86-90%, did report SOB but also has dark fingernail polish on, reads have been good on chart review. Will need 's assist for bed mobility. Discussed walking program/circulatory exercises. May benefit from output PT when appropriate in stage of recovery to return to higher level exercise. Will continue to follow while in house.   See \"Rehab Therapy-Acute\" Patient Summary Report for complete documentation.     "

## 2017-05-22 NOTE — ED NOTES
Med rec updated and  Complete  Allergies reviewed.  Met with pt at bedside and dicussed current medications  And last doses taken.  Pt recently started on ANASTROZOLE . Pt thought she was   Having a reaction to it and was told to stop the medication   For 2 weeks by her  Doctor.  Pt last took anastrozole on Friday.
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
## 2017-05-22 NOTE — PROGRESS NOTES
"  Trauma/Surgical Progress Note    Author: Ashwini Graham Date & Time created: 5/22/2017   10:22 AM     Interval Events:    New admit to neuro floor, L2 fracture and PTX  Neuro recs reviewed  Repeat standing x-rays ordered  PT/OT eval  Tertiary survey completed  Counseled    Review of Systems   Constitutional: Negative for fever and chills.   Eyes: Negative for blurred vision and double vision.   Respiratory: Negative for cough and shortness of breath.    Cardiovascular: Negative for chest pain.   Gastrointestinal: Negative for nausea, vomiting and abdominal pain.   Genitourinary:        Voiding    Musculoskeletal: Positive for myalgias, back pain and neck pain.   Skin: Negative for rash.   Neurological: Negative for dizziness, tingling, sensory change, focal weakness and headaches.     Hemodynamics:  Blood pressure 128/74, pulse 78, temperature 36.9 °C (98.5 °F), resp. rate 16, height 1.575 m (5' 2\"), weight 48.3 kg (106 lb 7.7 oz), SpO2 92 %, not currently breastfeeding.     Respiratory:    Respiration: 16, Pulse Oximetry: 92 %, O2 Daily Delivery Respiratory : Room Air with O2 Available     Work Of Breathing / Effort: Mild  RUL Breath Sounds: Clear, RML Breath Sounds: Clear, RLL Breath Sounds: Diminished, ELIZ Breath Sounds: Clear, LLL Breath Sounds: Diminished  Fluids:    Intake/Output Summary (Last 24 hours) at 05/22/17 1022  Last data filed at 05/22/17 0551   Gross per 24 hour   Intake      0 ml   Output      0 ml   Net      0 ml     Admit Weight: 47.628 kg (105 lb)  Current Weight: 48.3 kg (106 lb 7.7 oz)    Physical Exam   Constitutional: She is oriented to person, place, and time. She appears well-developed. No distress.   HENT:   Head: Normocephalic.   Eyes: Conjunctivae are normal.   Neck: No JVD present. No tracheal deviation present.   Cardiovascular: Normal rate.    Pulmonary/Chest: Effort normal. No respiratory distress.   Abdominal: Soft. She exhibits no distension. There is no tenderness. There is no " guarding.   Musculoskeletal: She exhibits tenderness (chronic right hip pain).   Moves all extremities   Neurological: She is alert and oriented to person, place, and time.   Skin: Skin is warm and dry.   Psychiatric: She has a normal mood and affect.   Nursing note and vitals reviewed.      Medical Decision Making/Problem List:    Active Hospital Problems    Diagnosis   • Closed stable burst fracture of second lumbar vertebra (CMS-HCC) [S32.021A]     Priority: High     Burst fracture involving the superior endplate of L2 with slight retropulsion of the posterior superior endplate with approximately 20% decrease in width of the central canal.  Non-operative management.   Off the shelf brace ordered by NS  5/22 - standing lumbar spine x rays pending  Scott Gonzalez MD - Neurosurgeon.     • Pneumothorax, right [J93.9]     Priority: Medium     Questionable small right-sided pneumothorax without evidence of rib fracture.  Not visualized on repeat CXR with HOB at 90.  5/22 Repeat CXR with tiny apical PTX  Respiratory protocol.     • Chronic right hip pain [M25.551, G89.29]     Priority: Low     Chronic right hip pain  On multiple medications  Restart home meds  Followed by Dr HANNY Mckay        Core Measures & Quality Metrics:  Labs reviewed, Medications reviewed and Radiology images reviewed  Brunner catheter: No Brunner        DVT prophylaxis - mechanical: SCDs  Ulcer prophylaxis: Yes        Total Score: 4    ETOH Screening     Intervention complete date: 5/22/2017  Patient response to intervention: Minimal social alcohol use, denies tobacco or illicit drug use.   Patient demonstrats understanding of intervention.Plan of care: Denies need for further intervention    has not been contacted.Follow up with: PCP  Total ETOH intervention time: 15 - 30 mintues    Discussed patient condition with RN, Patient and trauma surgery, Dr. HEIDY Hoff.    Patient seen, data reviewed and discussed.  Agree with assessment and  plan.  ADITI
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
## 2017-05-22 NOTE — PROGRESS NOTES
Spoke with Ehsan CHRISTENSEN with Neurosurgery and patient is cleared to DC to home from Neurosurgery.

## 2017-05-22 NOTE — PROGRESS NOTES
Received report from nurse in ER. Nurse stated no sig med event during shift. Pt was transported to the unit. Pt has mild c/o pain and will medicate per orders. Vss. Will continue to monitor.
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
## 2017-05-22 NOTE — THERAPY
"Occupational Therapy Evaluation completed.   Functional Status:  SBA supine to sit using log roll.  Mod A LB dressing.  Mod A to don/doff TLSO.  Pt able to complete toilet transfer and toileting supervised.  Pt educated on LB dressing AE and spinal precautions.  Pt is eager to DC home.  Plan of Care: Will benefit from Occupational Therapy 3 times per week  Discharge Recommendations:  Equipment: LB dressing AE. Post-acute therapy Discharge to home with outpatient or home health for additional skilled therapy services.    See \"Rehab Therapy-Acute\" Patient Summary Report for complete documentation.    "
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
## 2017-05-22 NOTE — PROGRESS NOTES
Progress Note               Author: Ehsan JOVITA Alonzo Date & Time created: 2017  8:30 AM     Interval History:  Doing well.  Got brace.  Fits well.  Some back pain. No lower extremity pain/paresthesia.     Review of Systems:  Review of Systems   Musculoskeletal: Positive for back pain.   Neurological: Negative for sensory change and focal weakness.       Physical Exam:  Physical Exam   Musculoskeletal:   Motor 5/5.    Neurological:   Sensory intact.        Labs:        Invalid input(s): [id number]      Recent Labs      17   1730   SODIUM  139   POTASSIUM  4.0   CHLORIDE  104   CO2  27   BUN  12   CREATININE  0.64   CALCIUM  8.9     Recent Labs      17   1730   ALTSGPT  25   ASTSGOT  41   ALKPHOSPHAT  65   TBILIRUBIN  0.4   GLUCOSE  107*     Recent Labs      17   1730   RBC  4.32   HEMOGLOBIN  12.9   HEMATOCRIT  39.3   PLATELETCT  244   PROTHROMBTM  12.6   APTT  33.9   INR  0.92     Recent Labs      17   1730   WBC  5.2   ASTSGOT  41   ALTSGPT  25   ALKPHOSPHAT  65   TBILIRUBIN  0.4     Hemodynamics:  Temp (24hrs), Av.9 °C (98.4 °F), Min:36.4 °C (97.5 °F), Max:37.2 °C (99 °F)  Temperature: 36.9 °C (98.5 °F)  Pulse  Av.1  Min: 70  Max: 91Heart Rate (Monitored): 77  Blood Pressure: 128/74 mmHg, NIBP: 121/76 mmHg     Respiratory:    Respiration: 16, Pulse Oximetry: 92 %, O2 Daily Delivery Respiratory : Room Air with O2 Available     Work Of Breathing / Effort: Mild  RUL Breath Sounds: Clear, RML Breath Sounds: Clear, RLL Breath Sounds: Diminished, ELIZ Breath Sounds: Clear, LLL Breath Sounds: Diminished  Fluids:    Intake/Output Summary (Last 24 hours) at 17 0830  Last data filed at 17 0551   Gross per 24 hour   Intake      0 ml   Output      0 ml   Net      0 ml     Weight: 48.3 kg (106 lb 7.7 oz)  GI/Nutrition:  Orders Placed This Encounter   Procedures   • Diet Order; Clear Liquid     Standing Status: Standing      Number of Occurrences: 1      Standing Expiration  Date:      Order Specific Question:  Diet:     Answer:  Clear Liquid [10]     Medical Decision Making, by Problem:  Active Hospital Problems    Diagnosis   • Closed stable burst fracture of second lumbar vertebra (CMS-HCC) [S32.021A]   • Pneumothorax, right [J93.9]   • Chronic right hip pain [M25.551, G89.29]       Plan:  TLSO  PT/OT  X-rays today in brace.    Core Measures

## 2017-05-22 NOTE — PROGRESS NOTES
Fitted patient with medium TLSO brace. Patient and family has been educated on how to use brace. Any questions or concerns please call traction @ [phone]

## 2017-05-23 NOTE — PROGRESS NOTES
"/95 mmHg  Pulse 56  Temp(Src) 36.6 °C (97.8 °F)  Resp 16  Ht 1.575 m (5' 2\")  Wt 48.3 kg (106 lb 7.7 oz)  BMI 19.47 kg/m2  SpO2 98%  Breastfeeding? No    Patient examined and tertiary survey completed  Cleared for discharge by therapy and neurosurgery  Discharge instructions provided  Counseled patient    "

## 2017-05-23 NOTE — PROGRESS NOTES
Patient and  educated on discharge instructions, home medications and follow up appt. Patient and  verbalized understanding.

## 2017-05-23 NOTE — DISCHARGE INSTRUCTIONS
Discharge Instructions    Discharged to home by car with relative. Discharged via wheelchair, hospital escort: Yes.  Special equipment needed: TLSO    Be sure to schedule a follow-up appointment with your primary care doctor or any specialists as instructed.     Discharge Plan:     Follow PT and OT instructions on restrictions.  Wear TLSO brace as directed.  Call Dr. Mireles's office to set up an appointment for 3 months with a CT scan of the lumbar with no contrast. [phone].    Diet Plan: Discussed  Activity Level: Discussed  Confirmed Follow up Appointment: Patient to Call and Schedule Appointment  Confirmed Symptoms Management: Discussed  Medication Reconciliation Updated: Yes  Influenza Vaccine Indication: Not indicated: Previously immunized this influenza season and > 8 years of age    I understand that a diet low in cholesterol, fat, and sodium is recommended for good health. Unless I have been given specific instructions below for another diet, I accept this instruction as my diet prescription.   Other diet: Regular    Special Instructions: None    · Is patient discharged on Warfarin / Coumadin?   No     · Is patient Post Blood Transfusion?  No    Depression / Suicide Risk    As you are discharged from this [facility] facility, it is important to learn how to keep safe from harming yourself.    Recognize the warning signs:  · Abrupt changes in personality, positive or negative- including increase in energy   · Giving away possessions  · Change in eating patterns- significant weight changes-  positive or negative  · Change in sleeping patterns- unable to sleep or sleeping all the time   · Unwillingness or inability to communicate  · Depression  · Unusual sadness, discouragement and loneliness  · Talk of wanting to die  · Neglect of personal appearance   · Rebelliousness- reckless behavior  · Withdrawal from people/activities they love  · Confusion- inability to concentrate     If you or a loved one  observes any of these behaviors or has concerns about self-harm, here's what you can do:  · Talk about it- your feelings and reasons for harming yourself  · Remove any means that you might use to hurt yourself (examples: pills, rope, extension cords, firearm)  · Get professional help from the community (Mental Health, Substance Abuse, psychological counseling)  · Do not be alone:Call your Safe Contact- someone whom you trust who will be there for you.  · Call your local CRISIS HOTLINE 170-3109 or 434-382-9548  · Call your local Children's Mobile Crisis Response Team [place] (088) 985-3346 or www.[employer]  · Call the toll free National Suicide Prevention Hotlines   · National Suicide Prevention Lifeline 906-020-QMPV (2904)  · National Hope Line Network 800-SUICIDE (401-7292)
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
## 2017-05-30 ENCOUNTER — OFFICE VISIT (OUTPATIENT)
Dept: INTERNAL MEDICINE | Facility: IMAGING CENTER | Age: 62
End: 2017-05-30
Payer: COMMERCIAL

## 2017-05-30 ENCOUNTER — OFFICE VISIT (OUTPATIENT)
Dept: OTHER | Facility: IMAGING CENTER | Age: 62
End: 2017-05-30
Payer: COMMERCIAL

## 2017-05-30 VITALS
OXYGEN SATURATION: 95 % | DIASTOLIC BLOOD PRESSURE: 70 MMHG | RESPIRATION RATE: 14 BRPM | HEIGHT: 62 IN | HEART RATE: 89 BPM | WEIGHT: 103 LBS | SYSTOLIC BLOOD PRESSURE: 100 MMHG | BODY MASS INDEX: 18.95 KG/M2 | TEMPERATURE: 98.6 F

## 2017-05-30 DIAGNOSIS — G89.29 CHRONIC RIGHT HIP PAIN: Primary | ICD-10-CM

## 2017-05-30 DIAGNOSIS — M25.551 CHRONIC RIGHT HIP PAIN: Primary | ICD-10-CM

## 2017-05-30 DIAGNOSIS — M54.2 CERVICALGIA: ICD-10-CM

## 2017-05-30 DIAGNOSIS — S32.021D CLOSED STABLE BURST FRACTURE OF SECOND LUMBAR VERTEBRA WITH ROUTINE HEALING, SUBSEQUENT ENCOUNTER: ICD-10-CM

## 2017-05-30 DIAGNOSIS — F11.20 CHRONIC NARCOTIC DEPENDENCE (HCC): ICD-10-CM

## 2017-05-30 DIAGNOSIS — M54.31 RIGHT SIDED SCIATICA: ICD-10-CM

## 2017-05-30 PROBLEM — J93.9 PNEUMOTHORAX, RIGHT: Status: RESOLVED | Noted: 2017-05-21 | Resolved: 2017-05-30

## 2017-05-30 PROCEDURE — 99213 OFFICE O/P EST LOW 20 MIN: CPT | Mod: 25 | Performed by: FAMILY MEDICINE

## 2017-05-30 PROCEDURE — 97813 ACUP 1/> W/ESTIM 1ST 15 MIN: CPT | Performed by: FAMILY MEDICINE

## 2017-05-30 PROCEDURE — 99213 OFFICE O/P EST LOW 20 MIN: CPT | Performed by: FAMILY MEDICINE

## 2017-05-30 PROCEDURE — 97811 ACUP 1/> W/O ESTIM EA ADD 15: CPT | Performed by: FAMILY MEDICINE
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
## 2017-05-30 NOTE — MR AVS SNAPSHOT
"        Adrianne Martinez   2017 4:00 PM   Office Visit   MRN: [id number]    Department:  [facility]   Dept Phone:  [phone]    Description:  Female : 1955   Provider:  Danielle Blas M.D.           Reason for Visit     Hospital Follow-up           Allergies as of 2017     Allergen Noted Reactions    Contrast Media With Iodine [Iodine] 2013       Malaise, weakness, inability to get up off table, mental status changes    Cherry 10/29/2013   Rash    Latex 2017       rash    Oxycontin [Oxycodone Hcl] 2014   Vomiting    Able to take vicodin.    Cherry 2017   Rash    Raw cherries     Contrast Media With Iodine [Iodine] 2017       Demerol 2010   Vomiting    Meperidine 2017   Hives    Nsaids 2013   Rash    Tape 2017       Rash, blister       You were diagnosed with     Closed stable burst fracture of second lumbar vertebra with routine healing, subsequent encounter   [7646101]       Chronic narcotic dependence (CMS-HCC)   [585191]         Vital Signs     Blood Pressure Pulse Temperature Respirations Height Weight    100/70 mmHg 89 37 °C (98.6 °F) 14 1.575 m (5' 2.01\") 46.72 kg (103 lb)    Body Mass Index Oxygen Saturation Smoking Status             18.83 kg/m2 95% Never Smoker          Basic Information     Date Of Birth Sex Race Ethnicity Preferred Language    1955 Female  Non- English      Your appointments     2017 10:30 AM   ACUPUNCTURE 30 with David Vieyra M.D.   [facility] (--)    [address]  [place] [address]   [phone]            Sep 11, 2017 10:30 AM   MA DX30 with RB[facility] MG 1   [facility] ([address])    [address]  [address]  [place] [id number] [phone]              Problem List              ICD-10-CM Priority Class Noted - Resolved    Postmenopausal HRT (hormone replacement therapy) Z79.890   Unknown - " Present    Insomnia G47.00   Unknown - Present    Chronic pelvic pain in female R10.2, G89.29   Unknown - Present    ENDOMETRIOSIS    Unknown - Present    Recurrent UTI N39.0   Unknown - Present    Stress reaction F43.0   8/13/2013 - Present    Chronic pain G89.29   8/13/2013 - Present    Weight loss R63.4   8/13/2013 - Present    Family history of colon cancer Z80.0   Unknown - Present    Right sided sciatica M54.31   8/21/2015 - Present    Irritable bowel syndrome (IBS) K58.9   1/19/2016 - Present    Dermatitis L30.9   1/19/2016 - Present    Symptomatic menopausal or female climacteric states N95.1   3/8/2016 - Present    Frozen shoulder M75.00   3/8/2016 - Present    Headache syndrome G44.89   7/12/2016 - Present    History of opthalmic migraine Z86.69   7/12/2016 - Present    Spells [id number]   12/14/2016 - Present    Atherosclerosis of right carotid artery, mild I65.21   12/15/2016 - Present    Diverticulosis of large intestine without hemorrhage, per 12/23/16 COLONOSCOPY K57.30   1/14/2017 - Present    Adenomatous polyp of colon, per 12/23/16 COLONOSCOPY D12.6   1/14/2017 - Present    Infiltrating ductal carcinoma of left breast (CMS-HCC) C50.912   1/19/2017 - Present    Chronic narcotic dependence (CMS-HCC) F11.20   5/5/2017 - Present    History of breast cancer Z85.3   5/8/2017 - Present    Closed stable burst fracture of second lumbar vertebra (CMS-HCC) S32.021A High  5/21/2017 - Present    Chronic right hip pain M25.551, G89.29 Low  5/21/2017 - Present      Health Maintenance        Date Due Completion Dates    MAMMOGRAM 1/10/2018 1/10/2017, 12/12/2016, 4/20/2015, 4/9/2014, 3/6/2012, 9/15/2010, 9/14/2009, 9/14/2009, 8/29/2008, 8/29/2008, 12/6/2006, 11/21/2006, 9/8/2005    COLONOSCOPY 12/23/2021 12/23/2016    IMM DTaP/Tdap/Td Vaccine (2 - Td) 10/29/2023 10/29/2013            Current Immunizations     Hep A/HEP B Combined Vaccine (TwinRix) 2/8/2016    Influenza TIV (IM) 10/29/2013    Influenza Vaccine Adult HD  10/1/2016    Influenza Vaccine Quad Inj (Pf) 12/21/2015, 11/12/2014    Influenza Vaccine Quad Inj (Preserved) 10/24/2016    Pneumococcal Vaccine (PCV7) Historical Data 10/1/2016    SHINGLES VACCINE 7/31/2015    Tdap Vaccine 10/29/2013      Below and/or attached are the medications your provider expects you to take. Review all of your home medications and newly ordered medications with your provider and/or pharmacist. Follow medication instructions as directed by your provider and/or pharmacist. Please keep your medication list with you and share with your provider. Update the information when medications are discontinued, doses are changed, or new medications (including over-the-counter products) are added; and carry medication information at all times in the event of emergency situations     Allergies:  CONTRAST MEDIA WITH IODINE - (reactions not documented)     CHERRY - Rash     LATEX - (reactions not documented)     OXYCONTIN - Vomiting     CHERRY - Rash     CONTRAST MEDIA WITH IODINE - (reactions not documented)     DEMEROL - Vomiting     MEPERIDINE - Hives     NSAIDS - Rash     TAPE - (reactions not documented)               Medications  Valid as of: May 30, 2017 -  4:57 PM    Generic Name Brand Name Tablet Size Instructions for use    Anastrozole (Tab) ARIMIDEX 1 MG Take 1 mg by mouth every day.        Ascorbic Acid (Tab) ascorbic acid 500 MG Take 500 mg by mouth every day.        Calcium & Magnesium Carbonates   Take 1 Tab by mouth every day.        Cholecalciferol (Tab) Vitamin D3 2000 UNITS Take 1 Tab by mouth 2 Times a Day.        Cyanocobalamin (Tab) VITAMIN B-12 500 MCG Take 500 mcg by mouth every day.        DULoxetine HCl (Cap DR Particles) CYMBALTA 30 MG Take 30 mg by mouth every bedtime.        Omega-3 Fatty Acids (Cap) fish oil 1000 MG Take 1,000 mg by mouth every day.        OxyCODONE HCl (Tab) ROXICODONE 5 MG Take 1 Tab by mouth every 3 hours as needed for Moderate Pain ((NRS Pain Scale 4-6; CPOT  Pain Scale 3-5)).        Prasterone (DHEA) (Tab) DHEA 25 MG Take 25 mg by mouth every day.        Pregabalin (Cap) LYRICA 25 MG Take 25 mg by mouth every morning.        TiZANidine HCl (Tab) ZANAFLEX 4 MG Take 4 mg by mouth at bedtime as needed.        Zolpidem Tartrate (Tab) AMBIEN 10 MG Take 1 Tab by mouth at bedtime as needed for Sleep.        .                 Medicines prescribed today were sent to:     [facility]/PHARMACY #[id number] - [place], [place] - [address]    [address] [place] [address]    Phone: [phone] Fax: [phone]    Open 24 Hours?: No    [id number]  [place], [place] - [address] AT [facility]    [address] [address]    Phone: [phone] Fax: [phone]    Open 24 Hours?: No      Medication refill instructions:       If your prescription bottle indicates you have medication refills left, it is not necessary to call your provider’s office. Please contact your pharmacy and they will refill your medication.    If your prescription bottle indicates you do not have any refills left, you may request refills at any time through one of the following ways: The online [employer] system (except Urgent Care), by calling your provider’s office, or by asking your pharmacy to contact your provider’s office with a refill request. Medication refills are processed only during regular business hours and may not be available until the next business day. Your provider may request additional information or to have a follow-up visit with you prior to refilling your medication.   *Please Note: Medication refills are assigned a new Rx number when refilled electronically. Your pharmacy may indicate that no refills were authorized even though a new prescription for the same medication is available at the pharmacy. Please request the medicine by name with the pharmacy before contacting your provider for a refill.        Other Notes About Your Plan     [facility]  Pt has  marijuana card  Pain Management: Dr. Woody Gill Onc: Renglenn Rincon  Onc: Dr. Dutta  Accupuncture: Dr. Vieyra           MyChart Access Code: Activation code not generated  Current MyChart Status: Active
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
## 2017-05-31 NOTE — PATIENT INSTRUCTIONS
The side effects of Acupuncture needle insertion include: minor bruising, bleeding, or pain at the site of needle insertion.  If more worrisome symptoms, such as continued bleeding, severe bruising, or continue pain or altered sensation persist, please contact [facility] office @ [phone]

## 2017-06-05 ENCOUNTER — OFFICE VISIT (OUTPATIENT)
Dept: OTHER | Facility: IMAGING CENTER | Age: 62
End: 2017-06-05
Payer: COMMERCIAL

## 2017-06-05 DIAGNOSIS — S32.021D CLOSED STABLE BURST FRACTURE OF SECOND LUMBAR VERTEBRA WITH ROUTINE HEALING, SUBSEQUENT ENCOUNTER: ICD-10-CM

## 2017-06-05 DIAGNOSIS — F11.20 CHRONIC NARCOTIC DEPENDENCE (HCC): ICD-10-CM

## 2017-06-05 DIAGNOSIS — M25.551 CHRONIC RIGHT HIP PAIN: ICD-10-CM

## 2017-06-05 DIAGNOSIS — G89.29 CHRONIC RIGHT HIP PAIN: ICD-10-CM

## 2017-06-05 DIAGNOSIS — Z85.3 HISTORY OF BREAST CANCER: ICD-10-CM

## 2017-06-05 DIAGNOSIS — M54.31 RIGHT SIDED SCIATICA: ICD-10-CM

## 2017-06-05 PROCEDURE — 97813 ACUP 1/> W/ESTIM 1ST 15 MIN: CPT | Performed by: FAMILY MEDICINE

## 2017-06-05 PROCEDURE — 97811 ACUP 1/> W/O ESTIM EA ADD 15: CPT | Performed by: FAMILY MEDICINE

## 2017-06-05 PROCEDURE — 99213 OFFICE O/P EST LOW 20 MIN: CPT | Mod: 25 | Performed by: FAMILY MEDICINE
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
## 2017-06-05 NOTE — MR AVS SNAPSHOT
Adrianne Martinez   2017 10:30 AM   Office Visit   MRN: [id number]    Department:  Acupuncture Med   Dept Phone:  [phone]    Description:  Female : 1955   Provider:  David Vieyra M.D.           Allergies as of 2017     Allergen Noted Reactions    Contrast Media With Iodine [Iodine] 2013       Malaise, weakness, inability to get up off table, mental status changes    Cherry 10/29/2013   Rash    Latex 2017       rash    Oxycontin [Oxycodone Hcl] 2014   Vomiting    Able to take vicodin.    Cherry 2017   Rash    Raw cherries     Contrast Media With Iodine [Iodine] 2017       Demerol 2010   Vomiting    Meperidine 2017   Hives    Nsaids 2013   Rash    Tape 2017       Rash, blister       You were diagnosed with     Closed stable burst fracture of second lumbar vertebra with routine healing, subsequent encounter   [5375128]       Chronic right hip pain   [656336]       Right sided sciatica   [169847]       History of breast cancer   [533148]       Chronic narcotic dependence (CMS-HCC)   [061057]         Vital Signs     Smoking Status                   Never Smoker            Basic Information     Date Of Birth Sex Race Ethnicity Preferred Language    1955 Female  Non- English      Your appointments     2017 11:00 AM   ACUPUNCTURE 30 with [facility] (--)    [address]  [place] [address]   [phone]            2017 11:00 AM   ACUPUNCTURE 30 with [facility] (--)    [address]  [place] [address]   [phone]            2017 11:00 AM   ACUPUNCTURE 30 with [facility] (--)    [address]  [place] [address]   [phone]            Sep 11, 2017 10:30 AM   MA DX30 with [facility] MG 1      [facility] ([facility] [address])    [address]  [address]  [place] [address]   [phone]              Problem List              ICD-10-CM Priority Class Noted - Resolved    Postmenopausal HRT (hormone replacement therapy) Z79.890   Unknown - Present    Insomnia G47.00   Unknown - Present    Chronic pelvic pain in female R10.2, G89.29   Unknown - Present    ENDOMETRIOSIS    Unknown - Present    Recurrent UTI N39.0   Unknown - Present    Stress reaction F43.0   8/13/2013 - Present    Chronic pain G89.29   8/13/2013 - Present    Weight loss R63.4   8/13/2013 - Present    Family history of colon cancer Z80.0   Unknown - Present    Right sided sciatica M54.31   8/21/2015 - Present    Irritable bowel syndrome (IBS) K58.9   1/19/2016 - Present    Dermatitis L30.9   1/19/2016 - Present    Symptomatic menopausal or female climacteric states N95.1   3/8/2016 - Present    Frozen shoulder M75.00   3/8/2016 - Present    Headache syndrome G44.89   7/12/2016 - Present    History of opthalmic migraine Z86.69   7/12/2016 - Present    Spells [id number]   12/14/2016 - Present    Atherosclerosis of right carotid artery, mild I65.21   12/15/2016 - Present    Diverticulosis of large intestine without hemorrhage, per 12/23/16 COLONOSCOPY K57.30   1/14/2017 - Present    Adenomatous polyp of colon, per 12/23/16 COLONOSCOPY D12.6   1/14/2017 - Present    Infiltrating ductal carcinoma of left breast (CMS-HCC) C50.912   1/19/2017 - Present    Chronic narcotic dependence (CMS-HCC) F11.20   5/5/2017 - Present    History of breast cancer Z85.3   5/8/2017 - Present    Closed stable burst fracture of second lumbar vertebra (CMS-HCC) S32.021A High  5/21/2017 - Present    Chronic right hip pain M25.551, G89.29 Low  5/21/2017 - Present      Health Maintenance        Date Due Completion Dates    MAMMOGRAM 1/10/2018 1/10/2017, 12/12/2016, 4/20/2015, 4/9/2014, 3/6/2012, 9/15/2010, 9/14/2009, 9/14/2009, 8/29/2008, 8/29/2008,  12/6/2006, 11/21/2006, 9/8/2005    COLONOSCOPY 12/23/2021 12/23/2016    IMM DTaP/Tdap/Td Vaccine (2 - Td) 10/29/2023 10/29/2013            Current Immunizations     Hep A/HEP B Combined Vaccine (TwinRix) 2/8/2016    Influenza TIV (IM) 10/29/2013    Influenza Vaccine Adult HD 10/1/2016    Influenza Vaccine Quad Inj (Pf) 12/21/2015, 11/12/2014    Influenza Vaccine Quad Inj (Preserved) 10/24/2016    Pneumococcal Vaccine (PCV7) Historical Data 10/1/2016    SHINGLES VACCINE 7/31/2015    Tdap Vaccine 10/29/2013      Below and/or attached are the medications your provider expects you to take. Review all of your home medications and newly ordered medications with your provider and/or pharmacist. Follow medication instructions as directed by your provider and/or pharmacist. Please keep your medication list with you and share with your provider. Update the information when medications are discontinued, doses are changed, or new medications (including over-the-counter products) are added; and carry medication information at all times in the event of emergency situations     Allergies:  CONTRAST MEDIA WITH IODINE - (reactions not documented)     CHERRY - Rash     LATEX - (reactions not documented)     OXYCONTIN - Vomiting     CHERRY - Rash     CONTRAST MEDIA WITH IODINE - (reactions not documented)     DEMEROL - Vomiting     MEPERIDINE - Hives     NSAIDS - Rash     TAPE - (reactions not documented)               Medications  Valid as of: June 05, 2017 - 11:49 AM    Generic Name Brand Name Tablet Size Instructions for use    Anastrozole (Tab) ARIMIDEX 1 MG Take 1 mg by mouth every day.        Ascorbic Acid (Tab) ascorbic acid 500 MG Take 500 mg by mouth every day.        Calcium & Magnesium Carbonates   Take 1 Tab by mouth every day.        Cholecalciferol (Tab) Vitamin D3 2000 UNITS Take 1 Tab by mouth 2 Times a Day.        Cyanocobalamin (Tab) VITAMIN B-12 500 MCG Take 500 mcg by mouth every day.        DULoxetine HCl (Cap DR  Particles) CYMBALTA 30 MG Take 30 mg by mouth every bedtime.        Omega-3 Fatty Acids (Cap) fish oil 1000 MG Take 1,000 mg by mouth every day.        OxyCODONE HCl (Tab) ROXICODONE 5 MG Take 1 Tab by mouth every 3 hours as needed for Moderate Pain ((NRS Pain Scale 4-6; CPOT Pain Scale 3-5)).        Prasterone (DHEA) (Tab) DHEA 25 MG Take 25 mg by mouth every day.        Pregabalin (Cap) LYRICA 25 MG Take 25 mg by mouth every morning.        TiZANidine HCl (Tab) ZANAFLEX 4 MG Take 4 mg by mouth at bedtime as needed.        Zolpidem Tartrate (Tab) AMBIEN 10 MG Take 1 Tab by mouth at bedtime as needed for Sleep.        .                 Medicines prescribed today were sent to:     [facility] #[id number] - [place], [place] - [address]    [address] [place] [address]    Phone: [phone] Fax: [phone]    Open 24 Hours?: No    [id number] - [place], [place] - [address] AT [address] [place] [id number] [facility] [address]    Phone: [phone] Fax: [phone]    Open 24 Hours?: No      Medication refill instructions:       If your prescription bottle indicates you have medication refills left, it is not necessary to call your provider’s office. Please contact your pharmacy and they will refill your medication.    If your prescription bottle indicates you do not have any refills left, you may request refills at any time through one of the following ways: The online [employer] system (except Urgent Care), by calling your provider’s office, or by asking your pharmacy to contact your provider’s office with a refill request. Medication refills are processed only during regular business hours and may not be available until the next business day. Your provider may request additional information or to have a follow-up visit with you prior to refilling your medication.   *Please Note: Medication refills are assigned a new Rx number when refilled electronically. Your pharmacy may indicate  that no refills were authorized even though a new prescription for the same medication is available at the pharmacy. Please request the medicine by name with the pharmacy before contacting your provider for a refill.        Instructions    The side effects of Acupuncture needle insertion include: minor bruising, bleeding, or pain at the site of needle insertion.  If more worrisome symptoms, such as continued bleeding, severe bruising, or continue pain or altered sensation persist, please contact [facility] office @ [phone]         Other Notes About Your Plan     [facility]  Pt has marijuana card  Pain Management: Dr. Woody Gill Onc: Wan Rincon  Onc: Dr. Dutta  Accupuncture: Dr. Vieyra           MyChart Access Code: Activation code not generated  Current MyChart Status: Active

## 2017-06-12 DIAGNOSIS — S32.021D CLOSED STABLE BURST FRACTURE OF SECOND LUMBAR VERTEBRA WITH ROUTINE HEALING, SUBSEQUENT ENCOUNTER: ICD-10-CM

## 2017-06-13 ENCOUNTER — OFFICE VISIT (OUTPATIENT)
Dept: OTHER | Facility: IMAGING CENTER | Age: 62
End: 2017-06-13
Payer: COMMERCIAL

## 2017-06-13 DIAGNOSIS — G89.29 CHRONIC RIGHT HIP PAIN: ICD-10-CM

## 2017-06-13 DIAGNOSIS — M54.31 RIGHT SIDED SCIATICA: ICD-10-CM

## 2017-06-13 DIAGNOSIS — S32.021D CLOSED STABLE BURST FRACTURE OF SECOND LUMBAR VERTEBRA WITH ROUTINE HEALING, SUBSEQUENT ENCOUNTER: ICD-10-CM

## 2017-06-13 DIAGNOSIS — Z85.3 HISTORY OF BREAST CANCER: ICD-10-CM

## 2017-06-13 DIAGNOSIS — M25.551 CHRONIC RIGHT HIP PAIN: ICD-10-CM

## 2017-06-13 PROCEDURE — 97813 ACUP 1/> W/ESTIM 1ST 15 MIN: CPT | Performed by: FAMILY MEDICINE

## 2017-06-13 PROCEDURE — 99213 OFFICE O/P EST LOW 20 MIN: CPT | Mod: 25 | Performed by: FAMILY MEDICINE

## 2017-06-13 PROCEDURE — 97811 ACUP 1/> W/O ESTIM EA ADD 15: CPT | Performed by: FAMILY MEDICINE

## 2017-06-13 NOTE — MR AVS SNAPSHOT
Adrianne Martinez   2017 11:00 AM   Office Visit   MRN: [id number]    Department:  Acupuncture Med   Dept Phone:  [phone]    Description:  Female : 1955   Provider:  David Vieyra M.D.           Allergies as of 2017     Allergen Noted Reactions    Contrast Media With Iodine [Iodine] 2013       Malaise, weakness, inability to get up off table, mental status changes    Cherry 10/29/2013   Rash    Latex 2017       rash    Oxycontin [Oxycodone Hcl] 2014   Vomiting    Able to take vicodin.    Cherry 2017   Rash    Raw cherries     Contrast Media With Iodine [Iodine] 2017       Demerol 2010   Vomiting    Meperidine 2017   Hives    Nsaids 2013   Rash    Tape 2017       Rash, blister       Vital Signs     Smoking Status                   Never Smoker            Basic Information     Date Of Birth Sex Race Ethnicity Preferred Language    1955 Female  Non- English      Your appointments     2017 11:00 AM   ACUPUNCTURE 30 with David Vieyra M.D.   [facility] (--)    [address]  [facility] [address]   [phone]            2017 11:00 AM   ACUPUNCTURE 30 with David Vieyra M.D.   [facility] (--)    [address]  [place] [address]   [phone]            Sep 11, 2017 10:30 AM   MA DX30 with [facility] MG 1   [facility] ([address])    [address]  [facility] [place] [address]   [phone]              Problem List              ICD-10-CM Priority Class Noted - Resolved    Postmenopausal HRT (hormone replacement therapy) Z79.890   Unknown - Present    Insomnia G47.00   Unknown - Present    Chronic pelvic pain in female R10.2, G89.29   Unknown - Present    ENDOMETRIOSIS    Unknown - Present    Recurrent UTI N39.0   Unknown - Present    Stress reaction F43.0   2013 - Present    Chronic pain G89.29   8/13/2013 - Present    Weight loss R63.4   8/13/2013 - Present    Family history of colon cancer Z80.0   Unknown - Present    Right sided sciatica M54.31   8/21/2015 - Present    Irritable bowel syndrome (IBS) K58.9   1/19/2016 - Present    Dermatitis L30.9   1/19/2016 - Present    Symptomatic menopausal or female climacteric states N95.1   3/8/2016 - Present    Frozen shoulder M75.00   3/8/2016 - Present    Headache syndrome G44.89   7/12/2016 - Present    History of opthalmic migraine Z86.69   7/12/2016 - Present    Spells [id number]   12/14/2016 - Present    Atherosclerosis of right carotid artery, mild I65.21   12/15/2016 - Present    Diverticulosis of large intestine without hemorrhage, per 12/23/16 COLONOSCOPY K57.30   1/14/2017 - Present    Adenomatous polyp of colon, per 12/23/16 COLONOSCOPY D12.6   1/14/2017 - Present    Infiltrating ductal carcinoma of left breast (CMS-HCC) C50.912   1/19/2017 - Present    Chronic narcotic dependence (CMS-HCC) F11.20   5/5/2017 - Present    History of breast cancer Z85.3   5/8/2017 - Present    Closed stable burst fracture of second lumbar vertebra (CMS-HCC) S32.021A High  5/21/2017 - Present    Chronic right hip pain M25.551, G89.29 Low  5/21/2017 - Present      Health Maintenance        Date Due Completion Dates    MAMMOGRAM 1/10/2018 1/10/2017, 12/12/2016, 4/20/2015, 4/9/2014, 3/6/2012, 9/15/2010, 9/14/2009, 9/14/2009, 8/29/2008, 8/29/2008, 12/6/2006, 11/21/2006, 9/8/2005    COLONOSCOPY 12/23/2021 12/23/2016    IMM DTaP/Tdap/Td Vaccine (2 - Td) 10/29/2023 10/29/2013            Current Immunizations     Hep A/HEP B Combined Vaccine (TwinRix) 2/8/2016    Influenza TIV (IM) 10/29/2013    Influenza Vaccine Adult HD 10/1/2016    Influenza Vaccine Quad Inj (Pf) 12/21/2015, 11/12/2014    Influenza Vaccine Quad Inj (Preserved) 10/24/2016    Pneumococcal Vaccine (PCV7) Historical Data 10/1/2016    SHINGLES VACCINE 7/31/2015    Tdap Vaccine 10/29/2013         Below and/or attached are the medications your provider expects you to take. Review all of your home medications and newly ordered medications with your provider and/or pharmacist. Follow medication instructions as directed by your provider and/or pharmacist. Please keep your medication list with you and share with your provider. Update the information when medications are discontinued, doses are changed, or new medications (including over-the-counter products) are added; and carry medication information at all times in the event of emergency situations     Allergies:  CONTRAST MEDIA WITH IODINE - (reactions not documented)     CHERRY - Rash     LATEX - (reactions not documented)     OXYCONTIN - Vomiting     CHERRY - Rash     CONTRAST MEDIA WITH IODINE - (reactions not documented)     DEMEROL - Vomiting     MEPERIDINE - Hives     NSAIDS - Rash     TAPE - (reactions not documented)               Medications  Valid as of: June 13, 2017 - 12:04 PM    Generic Name Brand Name Tablet Size Instructions for use    Anastrozole (Tab) ARIMIDEX 1 MG Take 1 mg by mouth every day.        Ascorbic Acid (Tab) ascorbic acid 500 MG Take 500 mg by mouth every day.        Calcium & Magnesium Carbonates   Take 1 Tab by mouth every day.        Cholecalciferol (Tab) Vitamin D3 2000 UNITS Take 1 Tab by mouth 2 Times a Day.        Cyanocobalamin (Tab) VITAMIN B-12 500 MCG Take 500 mcg by mouth every day.        DULoxetine HCl (Cap DR Particles) CYMBALTA 30 MG Take 30 mg by mouth every bedtime.        Omega-3 Fatty Acids (Cap) fish oil 1000 MG Take 1,000 mg by mouth every day.        OxyCODONE HCl (Tab) ROXICODONE 5 MG Take 1 Tab by mouth every 3 hours as needed for Moderate Pain ((NRS Pain Scale 4-6; CPOT Pain Scale 3-5)).        Prasterone (DHEA) (Tab) DHEA 25 MG Take 25 mg by mouth every day.        Pregabalin (Cap) LYRICA 25 MG Take 25 mg by mouth every morning.        TiZANidine HCl (Tab) ZANAFLEX 4 MG Take 4 mg by mouth at bedtime as  needed.        Zolpidem Tartrate (Tab) AMBIEN 10 MG Take 1 Tab by mouth at bedtime as needed for Sleep.        .                 Medicines prescribed today were sent to:     [facility] #[id number] - [place], [place] - [address]    [address] [place] [address]    Phone: [phone] Fax: [phone]    Open 24 Hours?: No    [id number] - [place], [place] - [address] AT [address]    [address] [place] [address]    Phone: [phone] Fax: [phone]    Open 24 Hours?: No      Medication refill instructions:       If your prescription bottle indicates you have medication refills left, it is not necessary to call your provider’s office. Please contact your pharmacy and they will refill your medication.    If your prescription bottle indicates you do not have any refills left, you may request refills at any time through one of the following ways: The online [employer] system (except Urgent Care), by calling your provider’s office, or by asking your pharmacy to contact your provider’s office with a refill request. Medication refills are processed only during regular business hours and may not be available until the next business day. Your provider may request additional information or to have a follow-up visit with you prior to refilling your medication.   *Please Note: Medication refills are assigned a new Rx number when refilled electronically. Your pharmacy may indicate that no refills were authorized even though a new prescription for the same medication is available at the pharmacy. Please request the medicine by name with the pharmacy before contacting your provider for a refill.        Other Notes About Your Plan     [facility]  Pt has marijuana card  Pain Management: Dr. Woody Gill Onc: Wan Rincon  Onc: Dr. Dutta  Accupuncture: Dr. Azalia Haq Access Code: Activation code not generated  Current [employer] Status: Active

## 2017-06-13 NOTE — PATIENT INSTRUCTIONS
The side effects of Acupuncture needle insertion include: minor bruising, bleeding, or pain at the site of needle insertion.  If more worrisome symptoms, such as continued bleeding, severe bruising, or continue pain or altered sensation persist, please contact [facility] office @ [phone]
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
## 2017-06-20 ENCOUNTER — OFFICE VISIT (OUTPATIENT)
Dept: OTHER | Facility: IMAGING CENTER | Age: 62
End: 2017-06-20
Payer: COMMERCIAL

## 2017-06-20 DIAGNOSIS — M54.2 CERVICALGIA: Primary | ICD-10-CM

## 2017-06-20 DIAGNOSIS — M54.31 RIGHT SIDED SCIATICA: ICD-10-CM

## 2017-06-20 DIAGNOSIS — Z85.3 HISTORY OF BREAST CANCER: ICD-10-CM

## 2017-06-20 DIAGNOSIS — G89.29 CHRONIC RIGHT HIP PAIN: ICD-10-CM

## 2017-06-20 DIAGNOSIS — S32.021D CLOSED STABLE BURST FRACTURE OF SECOND LUMBAR VERTEBRA WITH ROUTINE HEALING, SUBSEQUENT ENCOUNTER: ICD-10-CM

## 2017-06-20 DIAGNOSIS — F43.21 ADJUSTMENT DISORDER WITH DEPRESSED MOOD: ICD-10-CM

## 2017-06-20 DIAGNOSIS — M25.551 CHRONIC RIGHT HIP PAIN: ICD-10-CM

## 2017-06-20 PROCEDURE — 99213 OFFICE O/P EST LOW 20 MIN: CPT | Mod: 25 | Performed by: FAMILY MEDICINE

## 2017-06-20 PROCEDURE — 97811 ACUP 1/> W/O ESTIM EA ADD 15: CPT | Performed by: FAMILY MEDICINE

## 2017-06-20 PROCEDURE — 97813 ACUP 1/> W/ESTIM 1ST 15 MIN: CPT | Performed by: FAMILY MEDICINE

## 2017-06-20 NOTE — PATIENT INSTRUCTIONS
The side effects of Acupuncture needle insertion include: minor bruising, bleeding, or pain at the site of needle insertion.  If more worrisome symptoms, such as continued bleeding, severe bruising, or continue pain or altered sensation persist, please contact [facility] office @ [phone]

## 2017-06-20 NOTE — MR AVS SNAPSHOT
Adrianne Martinez   2017 11:00 AM   Office Visit   MRN: [id number]    Department:  Acupuncture Med   Dept Phone:  [phone]    Description:  Female : 1955   Provider:  David Vieyra M.D.           Allergies as of 2017     Allergen Noted Reactions    Contrast Media With Iodine [Iodine] 2013       Malaise, weakness, inability to get up off table, mental status changes    Cherry 10/29/2013   Rash    Latex 2017       rash    Oxycontin [Oxycodone Hcl] 2014   Vomiting    Able to take vicodin.    Cherry 2017   Rash    Raw cherries     Contrast Media With Iodine [Iodine] 2017       Demerol 2010   Vomiting    Meperidine 2017   Hives    Nsaids 2013   Rash    Tape 2017       Rash, blister       Vital Signs     Smoking Status                   Never Smoker            Basic Information     Date Of Birth Sex Race Ethnicity Preferred Language    1955 Female  Non- English      Your appointments     2017 11:00 AM   ACUPUNCTURE 30 with David Vieyra M.D.   [facility] (--)    [address] [facility].  [facility] [address]   [phone]            Sep 11, 2017 10:30 AM   MA DX30 with RB MG 1   [facility] ([address])    [address] [place] [address] [address]  [facility] [address]   [phone]              Problem List              ICD-10-CM Priority Class Noted - Resolved    Postmenopausal HRT (hormone replacement therapy) Z79.890   Unknown - Present    Insomnia G47.00   Unknown - Present    Chronic pelvic pain in female R10.2, G89.29   Unknown - Present    ENDOMETRIOSIS    Unknown - Present    Recurrent UTI N39.0   Unknown - Present    Stress reaction F43.0   2013 - Present    Chronic pain G89.29   2013 - Present    Weight loss R63.4   2013 - Present    Family history of colon cancer Z80.0   Unknown - Present    Right sided sciatica M54.31   2015 - Present     Irritable bowel syndrome (IBS) K58.9   1/19/2016 - Present    Dermatitis L30.9   1/19/2016 - Present    Symptomatic menopausal or female climacteric states N95.1   3/8/2016 - Present    Frozen shoulder M75.00   3/8/2016 - Present    Headache syndrome G44.89   7/12/2016 - Present    History of opthalmic migraine Z86.69   7/12/2016 - Present    Spells [id number]   12/14/2016 - Present    Atherosclerosis of right carotid artery, mild I65.21   12/15/2016 - Present    Diverticulosis of large intestine without hemorrhage, per 12/23/16 COLONOSCOPY K57.30   1/14/2017 - Present    Adenomatous polyp of colon, per 12/23/16 COLONOSCOPY D12.6   1/14/2017 - Present    Infiltrating ductal carcinoma of left breast (CMS-HCC) C50.912   1/19/2017 - Present    Chronic narcotic dependence (CMS-[facility]) F11.20   5/5/2017 - Present    History of breast cancer Z85.3   5/8/2017 - Present    Closed stable burst fracture of second lumbar vertebra (CMS-[facility]) S32.021A High  5/21/2017 - Present    Chronic right hip pain M25.551, G89.29 Low  5/21/2017 - Present      Health Maintenance        Date Due Completion Dates    MAMMOGRAM 1/10/2018 1/10/2017, 12/12/2016, 4/20/2015, 4/9/2014, 3/6/2012, 9/15/2010, 9/14/2009, 9/14/2009, 8/29/2008, 8/29/2008, 12/6/2006, 11/21/2006, 9/8/2005    COLONOSCOPY 12/23/2021 12/23/2016    IMM DTaP/Tdap/Td Vaccine (2 - Td) 10/29/2023 10/29/2013            Current Immunizations     Hep A/HEP B Combined Vaccine (TwinRix) 2/8/2016    Influenza TIV (IM) 10/29/2013    Influenza Vaccine Adult HD 10/1/2016    Influenza Vaccine Quad Inj (Pf) 12/21/2015, 11/12/2014    Influenza Vaccine Quad Inj (Preserved) 10/24/2016    Pneumococcal Vaccine (PCV7) Historical Data 10/1/2016    SHINGLES VACCINE 7/31/2015    Tdap Vaccine 10/29/2013      Below and/or attached are the medications your provider expects you to take. Review all of your home medications and newly ordered medications with your provider and/or pharmacist. Follow medication  instructions as directed by your provider and/or pharmacist. Please keep your medication list with you and share with your provider. Update the information when medications are discontinued, doses are changed, or new medications (including over-the-counter products) are added; and carry medication information at all times in the event of emergency situations     Allergies:  CONTRAST MEDIA WITH IODINE - (reactions not documented)     CHERRY - Rash     LATEX - (reactions not documented)     OXYCONTIN - Vomiting     CHERRY - Rash     CONTRAST MEDIA WITH IODINE - (reactions not documented)     DEMEROL - Vomiting     MEPERIDINE - Hives     NSAIDS - Rash     TAPE - (reactions not documented)               Medications  Valid as of: June 20, 2017 - 12:08 PM    Generic Name Brand Name Tablet Size Instructions for use    Anastrozole (Tab) ARIMIDEX 1 MG Take 1 mg by mouth every day.        Ascorbic Acid (Tab) ascorbic acid 500 MG Take 500 mg by mouth every day.        Calcium & Magnesium Carbonates   Take 1 Tab by mouth every day.        Cholecalciferol (Tab) Vitamin D3 2000 UNITS Take 1 Tab by mouth 2 Times a Day.        Cyanocobalamin (Tab) VITAMIN B-12 500 MCG Take 500 mcg by mouth every day.        DULoxetine HCl (Cap DR Particles) CYMBALTA 30 MG Take 30 mg by mouth every bedtime.        Omega-3 Fatty Acids (Cap) fish oil 1000 MG Take 1,000 mg by mouth every day.        OxyCODONE HCl (Tab) ROXICODONE 5 MG Take 1 Tab by mouth every 3 hours as needed for Moderate Pain ((NRS Pain Scale 4-6; CPOT Pain Scale 3-5)).        Prasterone (DHEA) (Tab) DHEA 25 MG Take 25 mg by mouth every day.        Pregabalin (Cap) LYRICA 25 MG Take 25 mg by mouth every morning.        TiZANidine HCl (Tab) ZANAFLEX 4 MG Take 4 mg by mouth at bedtime as needed.        Zolpidem Tartrate (Tab) AMBIEN 10 MG Take 1 Tab by mouth at bedtime as needed for Sleep.        .                 Medicines prescribed today were sent to:     [facility]ARMACY #[id number] - [place]  [place] - [address]    [address] [place] [address]    Phone: [phone] Fax: [phone]    Open 24 Hours?: No    [id number] - [place], [place] - [address] AT [facility] [address]    [address] [place] [address]    Phone: [phone] Fax: [phone]    Open 24 Hours?: No      Medication refill instructions:       If your prescription bottle indicates you have medication refills left, it is not necessary to call your provider’s office. Please contact your pharmacy and they will refill your medication.    If your prescription bottle indicates you do not have any refills left, you may request refills at any time through one of the following ways: The online leemail system (except Urgent Care), by calling your provider’s office, or by asking your pharmacy to contact your provider’s office with a refill request. Medication refills are processed only during regular business hours and may not be available until the next business day. Your provider may request additional information or to have a follow-up visit with you prior to refilling your medication.   *Please Note: Medication refills are assigned a new Rx number when refilled electronically. Your pharmacy may indicate that no refills were authorized even though a new prescription for the same medication is available at the pharmacy. Please request the medicine by name with the pharmacy before contacting your provider for a refill.        Other Notes About Your Plan     [facility]  Pt has marijuana card  Pain Management: Dr. Woody Gill Onc: Wan Rincon  Onc: Dr. Dutta  Accupuncture: Dr. Azalia Haq Access Code: Activation code not generated  Current leemail Status: Active
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
## 2017-06-26 ENCOUNTER — OFFICE VISIT (OUTPATIENT)
Dept: OTHER | Facility: IMAGING CENTER | Age: 62
End: 2017-06-26
Payer: COMMERCIAL

## 2017-06-26 DIAGNOSIS — M25.552 HIP PAIN, LEFT: Primary | ICD-10-CM

## 2017-06-26 DIAGNOSIS — G47.00 INSOMNIA, UNSPECIFIED TYPE: ICD-10-CM

## 2017-06-26 DIAGNOSIS — M54.2 CERVICALGIA: ICD-10-CM

## 2017-06-26 DIAGNOSIS — M54.31 RIGHT SIDED SCIATICA: ICD-10-CM

## 2017-06-26 DIAGNOSIS — Z85.3 HISTORY OF BREAST CANCER: ICD-10-CM

## 2017-06-26 DIAGNOSIS — S32.021D CLOSED STABLE BURST FRACTURE OF SECOND LUMBAR VERTEBRA WITH ROUTINE HEALING, SUBSEQUENT ENCOUNTER: ICD-10-CM

## 2017-06-26 PROCEDURE — 99213 OFFICE O/P EST LOW 20 MIN: CPT | Mod: 25 | Performed by: FAMILY MEDICINE

## 2017-06-26 PROCEDURE — 97813 ACUP 1/> W/ESTIM 1ST 15 MIN: CPT | Performed by: FAMILY MEDICINE

## 2017-06-26 PROCEDURE — 97811 ACUP 1/> W/O ESTIM EA ADD 15: CPT | Performed by: FAMILY MEDICINE

## 2017-06-26 NOTE — PATIENT INSTRUCTIONS
The side effects of Acupuncture needle insertion include: minor bruising, bleeding, or pain at the site of needle insertion.  If more worrisome symptoms, such as continued bleeding, severe bruising, or continue pain or altered sensation persist, please contact [facility] office @ [phone]

## 2017-06-26 NOTE — MR AVS SNAPSHOT
Adrianne Martinez   2017 11:00 AM   Office Visit   MRN: [id number]    Department:  Acupuncture Med   Dept Phone:  [phone]    Description:  Female : 1955   Provider:  David Vieyra M.D.           Allergies as of 2017     Allergen Noted Reactions    Contrast Media With Iodine [Iodine] 2013       Malaise, weakness, inability to get up off table, mental status changes    Cherry 10/29/2013   Rash    Latex 2017       rash    Oxycontin [Oxycodone Hcl] 2014   Vomiting    Able to take vicodin.    Cherry 2017   Rash    Raw cherries     Contrast Media With Iodine [Iodine] 2017       Demerol 2010   Vomiting    Meperidine 2017   Hives    Nsaids 2013   Rash    Tape 2017       Rash, blister       Vital Signs     Smoking Status                   Never Smoker            Basic Information     Date Of Birth Sex Race Ethnicity Preferred Language    1955 Female  Non- English      Your appointments     Aug 02, 2017  9:30 AM   ACUPUNCTURE 30 with David Vieyra M.D.   [facility] (--)    [address]  [place] [address]   [phone]            Aug 22, 2017 11:00 AM   ACUPUNCTURE 30 with David Vieyra M.D.   [facility] (--)    [address]  [place] [address]   [phone]            Sep 11, 2017 10:30 AM   MA DX30 with [facility] MG 1   [facility] ([address])    [address]  [facility] [address]   [phone]              Problem List              ICD-10-CM Priority Class Noted - Resolved    Postmenopausal HRT (hormone replacement therapy) Z79.890   Unknown - Present    Insomnia G47.00   Unknown - Present    Chronic pelvic pain in female R10.2, G89.29   Unknown - Present    ENDOMETRIOSIS    Unknown - Present    Recurrent UTI N39.0   Unknown - Present    Stress reaction F43.0   2013 - Present    Chronic pain G89.29   8/13/2013 - Present    Weight loss R63.4   8/13/2013 - Present    Family history of colon cancer Z80.0   Unknown - Present    Right sided sciatica M54.31   8/21/2015 - Present    Irritable bowel syndrome (IBS) K58.9   1/19/2016 - Present    Dermatitis L30.9   1/19/2016 - Present    Symptomatic menopausal or female climacteric states N95.1   3/8/2016 - Present    Frozen shoulder M75.00   3/8/2016 - Present    Headache syndrome G44.89   7/12/2016 - Present    History of opthalmic migraine Z86.69   7/12/2016 - Present    Spells [id number]   12/14/2016 - Present    Atherosclerosis of right carotid artery, mild I65.21   12/15/2016 - Present    Diverticulosis of large intestine without hemorrhage, per 12/23/16 COLONOSCOPY K57.30   1/14/2017 - Present    Adenomatous polyp of colon, per 12/23/16 COLONOSCOPY D12.6   1/14/2017 - Present    Infiltrating ductal carcinoma of left breast (CMS-HCC) C50.912   1/19/2017 - Present    Chronic narcotic dependence (CMS-HCC) F11.20   5/5/2017 - Present    History of breast cancer Z85.3   5/8/2017 - Present    Closed stable burst fracture of second lumbar vertebra (CMS-HCC) S32.021A High  5/21/2017 - Present    Chronic right hip pain M25.551, G89.29 Low  5/21/2017 - Present      Health Maintenance        Date Due Completion Dates    MAMMOGRAM 1/10/2018 1/10/2017, 12/12/2016, 4/20/2015, 4/9/2014, 3/6/2012, 9/15/2010, 9/14/2009, 9/14/2009, 8/29/2008, 8/29/2008, 12/6/2006, 11/21/2006, 9/8/2005    COLONOSCOPY 12/23/2021 12/23/2016    IMM DTaP/Tdap/Td Vaccine (2 - Td) 10/29/2023 10/29/2013            Current Immunizations     Hep A/HEP B Combined Vaccine (TwinRix) 2/8/2016    Influenza TIV (IM) 10/29/2013    Influenza Vaccine Adult HD 10/1/2016    Influenza Vaccine Quad Inj (Pf) 12/21/2015, 11/12/2014    Influenza Vaccine Quad Inj (Preserved) 10/24/2016    Pneumococcal Vaccine (PCV7) Historical Data 10/1/2016    SHINGLES VACCINE 7/31/2015    Tdap Vaccine 10/29/2013         Below and/or attached are the medications your provider expects you to take. Review all of your home medications and newly ordered medications with your provider and/or pharmacist. Follow medication instructions as directed by your provider and/or pharmacist. Please keep your medication list with you and share with your provider. Update the information when medications are discontinued, doses are changed, or new medications (including over-the-counter products) are added; and carry medication information at all times in the event of emergency situations     Allergies:  CONTRAST MEDIA WITH IODINE - (reactions not documented)     CHERRY - Rash     LATEX - (reactions not documented)     OXYCONTIN - Vomiting     CHERRY - Rash     CONTRAST MEDIA WITH IODINE - (reactions not documented)     DEMEROL - Vomiting     MEPERIDINE - Hives     NSAIDS - Rash     TAPE - (reactions not documented)               Medications  Valid as of: June 26, 2017 - 12:18 PM    Generic Name Brand Name Tablet Size Instructions for use    Anastrozole (Tab) ARIMIDEX 1 MG Take 1 mg by mouth every day.        Ascorbic Acid (Tab) ascorbic acid 500 MG Take 500 mg by mouth every day.        Calcium & Magnesium Carbonates   Take 1 Tab by mouth every day.        Cholecalciferol (Tab) Vitamin D3 2000 UNITS Take 1 Tab by mouth 2 Times a Day.        Cyanocobalamin (Tab) VITAMIN B-12 500 MCG Take 500 mcg by mouth every day.        DULoxetine HCl (Cap DR Particles) CYMBALTA 30 MG Take 30 mg by mouth every bedtime.        Omega-3 Fatty Acids (Cap) fish oil 1000 MG Take 1,000 mg by mouth every day.        OxyCODONE HCl (Tab) ROXICODONE 5 MG Take 1 Tab by mouth every 3 hours as needed for Moderate Pain ((NRS Pain Scale 4-6; CPOT Pain Scale 3-5)).        Prasterone (DHEA) (Tab) DHEA 25 MG Take 25 mg by mouth every day.        Pregabalin (Cap) LYRICA 25 MG Take 25 mg by mouth every morning.        TiZANidine HCl (Tab) ZANAFLEX 4 MG Take 4 mg by mouth at bedtime as  needed.        Zolpidem Tartrate (Tab) AMBIEN 10 MG Take 1 Tab by mouth at bedtime as needed for Sleep.        .                 Medicines prescribed today were sent to:     [facility]Y #[id number] - [place], [place] - [address]    [address] [place] [address]    Phone: [phone] Fax: [phone]    Open 24 Hours?: No    [facility] - [place] - [address] [facility] [address]    [address] [place] [address]    Phone: [phone] Fax: [phone]    Open 24 Hours?: No      Medication refill instructions:       If your prescription bottle indicates you have medication refills left, it is not necessary to call your provider’s office. Please contact your pharmacy and they will refill your medication.    If your prescription bottle indicates you do not have any refills left, you may request refills at any time through one of the following ways: The online JobOn system (except Urgent Care), by calling your provider’s office, or by asking your pharmacy to contact your provider’s office with a refill request. Medication refills are processed only during regular business hours and may not be available until the next business day. Your provider may request additional information or to have a follow-up visit with you prior to refilling your medication.   *Please Note: Medication refills are assigned a new Rx number when refilled electronically. Your pharmacy may indicate that no refills were authorized even though a new prescription for the same medication is available at the pharmacy. Please request the medicine by name with the pharmacy before contacting your provider for a refill.        Other Notes About Your Plan     [facility]  Pt has marijuana card  Pain Management: Dr. Woody Gill Onc: Wan Rincon  Onc: Dr. Dutta  Accupuncture: Dr. Azalia Haq Access Code: Activation code not generated  Current JobOn Status: Active
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
## 2017-07-17 RX ORDER — ZOLPIDEM TARTRATE 10 MG/1
TABLET ORAL
Qty: 15 TAB | Refills: 2 | Status: SHIPPED
Start: 2017-07-17 | End: 2017-09-11 | Stop reason: SDUPTHER

## 2017-08-01 ENCOUNTER — HOSPITAL ENCOUNTER (OUTPATIENT)
Dept: RADIOLOGY | Facility: MEDICAL CENTER | Age: 62
End: 2017-08-01
Attending: NEUROLOGICAL SURGERY
Payer: COMMERCIAL

## 2017-08-01 DIAGNOSIS — S32.001S BURST FRACTURE OF LUMBAR VERTEBRA, SEQUELA: ICD-10-CM

## 2017-08-01 PROCEDURE — 72131 CT LUMBAR SPINE W/O DYE: CPT

## 2017-09-11 ENCOUNTER — HOSPITAL ENCOUNTER (OUTPATIENT)
Dept: RADIOLOGY | Facility: MEDICAL CENTER | Age: 62
End: 2017-09-11
Attending: RADIOLOGY
Payer: COMMERCIAL

## 2017-09-11 DIAGNOSIS — Z85.3 HISTORY OF BREAST CANCER: ICD-10-CM

## 2017-09-11 PROCEDURE — G0206 DX MAMMO INCL CAD UNI: HCPCS | Mod: LT

## 2017-09-12 ENCOUNTER — OFFICE VISIT (OUTPATIENT)
Dept: OTHER | Facility: IMAGING CENTER | Age: 62
End: 2017-09-12
Payer: COMMERCIAL

## 2017-09-12 DIAGNOSIS — G47.00 INSOMNIA, UNSPECIFIED TYPE: ICD-10-CM

## 2017-09-12 DIAGNOSIS — S32.021D CLOSED STABLE BURST FRACTURE OF SECOND LUMBAR VERTEBRA WITH ROUTINE HEALING, SUBSEQUENT ENCOUNTER: ICD-10-CM

## 2017-09-12 DIAGNOSIS — M25.552 CHRONIC HIP PAIN, LEFT: Primary | ICD-10-CM

## 2017-09-12 DIAGNOSIS — Z85.3 HISTORY OF BREAST CANCER: ICD-10-CM

## 2017-09-12 DIAGNOSIS — G89.29 CHRONIC HIP PAIN, LEFT: Primary | ICD-10-CM

## 2017-09-12 PROCEDURE — 99213 OFFICE O/P EST LOW 20 MIN: CPT | Mod: 25 | Performed by: FAMILY MEDICINE

## 2017-09-12 PROCEDURE — 97811 ACUP 1/> W/O ESTIM EA ADD 15: CPT | Performed by: FAMILY MEDICINE

## 2017-09-12 PROCEDURE — 97813 ACUP 1/> W/ESTIM 1ST 15 MIN: CPT | Performed by: FAMILY MEDICINE
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
## 2017-09-13 NOTE — PATIENT INSTRUCTIONS
The side effects of Acupuncture needle insertion include: minor bruising, bleeding, or pain at the site of needle insertion.  If more worrisome symptoms, such as continued bleeding, severe bruising, or continue pain or altered sensation persist, please contact [facility] office @ [phone]

## 2017-09-21 ENCOUNTER — HOSPITAL ENCOUNTER (OUTPATIENT)
Dept: RADIATION ONCOLOGY | Facility: MEDICAL CENTER | Age: 62
End: 2017-09-30
Attending: RADIOLOGY
Payer: COMMERCIAL

## 2017-09-21 VITALS
OXYGEN SATURATION: 96 % | HEART RATE: 87 BPM | BODY MASS INDEX: 18.75 KG/M2 | SYSTOLIC BLOOD PRESSURE: 119 MMHG | HEIGHT: 62 IN | DIASTOLIC BLOOD PRESSURE: 81 MMHG | WEIGHT: 101.9 LBS | TEMPERATURE: 98.5 F

## 2017-09-21 PROCEDURE — 99212 OFFICE O/P EST SF 10 MIN: CPT | Performed by: RADIOLOGY

## 2017-09-21 PROCEDURE — 99214 OFFICE O/P EST MOD 30 MIN: CPT | Performed by: RADIOLOGY

## 2017-09-21 RX ORDER — EXEMESTANE 25 MG/1
TABLET ORAL
COMMUNITY
Start: 2017-09-05 | End: 2019-01-14

## 2017-09-21 RX ORDER — TIZANIDINE HYDROCHLORIDE 4 MG/1
CAPSULE, GELATIN COATED ORAL
COMMUNITY
Start: 2017-09-05 | End: 2018-02-01

## 2017-09-21 RX ORDER — HYDROCODONE BITARTRATE AND ACETAMINOPHEN 5; 325 MG/1; MG/1
TABLET ORAL
COMMUNITY
Start: 2017-09-12 | End: 2021-01-17

## 2017-09-21 ASSESSMENT — PAIN SCALES - GENERAL: PAINLEVEL: 4=SLIGHT-MODERATE PAIN

## 2017-09-21 NOTE — PROGRESS NOTES
RADIATION ONCOLOGY FOLLOW-UP    DATE OF SERVICE: 9/21/2017    IDENTIFICATION:   A 62 y.o. female with stage IA infiltrating ductal carcinoma left breast central portion status post partial mastectomy sentinel node biopsy followed by accelerated partial breast irradiation using SHANTA device.  She received 3400 cGy 10 fractions completed 3/2/2017     HISTORY OF PRESENT ILLNESS:   Returns today for follow-up after undergoing mammography of the left breast. She continues on Aromasin and tolerating it without any significant problems. Her only complaint is hot flashes. She denies any breast or respiratory complaints.    CURRENT MEDICATIONS:  Current Outpatient Prescriptions   Medication Sig Dispense Refill   • hydrocodone-acetaminophen (NORCO) 5-325 MG Tab per tablet      • exemestane (AROMASIN) 25 MG tablet      • tizanidine (ZANAFLEX) 4 MG capsule      • zolpidem (AMBIEN) 10 MG Tab TAKE 1 TABLET BY MOUTH AT BEDTIME FOR SLEEP 30 Tab 2   • duloxetine (CYMBALTA) 30 MG Cap DR Particles Take 30 mg by mouth every bedtime.     • pregabalin (LYRICA) 25 MG Cap Take 25 mg by mouth every morning.     • cyanocobalamin (VITAMIN B-12) 500 MCG Tab Take 500 mcg by mouth every day.     • Cholecalciferol (VITAMIN D3) 2000 UNITS Tab Take 1 Tab by mouth 2 Times a Day.     • Omega-3 Fatty Acids (FISH OIL) 1000 MG Cap capsule Take 1,000 mg by mouth every day.     • CALCIUM & MAGNESIUM CARBONATES PO Take 1 Tab by mouth every day.     • DHEA 25 MG Tab Take 25 mg by mouth every day.     • oxycodone immediate release (ROXICODONE) 5 MG Tab Take 1 Tab by mouth every 3 hours as needed for Moderate Pain ((NRS Pain Scale 4-6; CPOT Pain Scale 3-5)). 30 Tab 0   • anastrozole (ARIMIDEX) 1 MG Tab Take 1 mg by mouth every day.     • ascorbic acid (ASCORBIC ACID) 500 MG Tab Take 500 mg by mouth every day.     • tizanidine (ZANAFLEX) 4 MG Tab Take 4 mg by mouth at bedtime as needed.       No current facility-administered medications for this encounter.   "      ALLERGIES:  Contrast media with iodine [iodine]; Cherry; Latex; Oxycontin [oxycodone hcl]; Cherry; Contrast media with iodine [iodine]; Demerol; Meperidine; Nsaids; and Tape    PHYSICAL EXAM:   /81   Pulse 87   Temp 36.9 °C (98.5 °F)   Ht 1.575 m (5' 2\")   Wt 46.2 kg (101 lb 14.4 oz)   SpO2 96%   BMI 18.64 kg/m²      Pain: 4  Location: Back     GENERAL: Alert and oriented no acute distress  HEENT:  Pupils are equal, round, and reactive to light.  Extraocular muscles   are intact. Sclerae nonicteric.  Conjunctivae pink.  Oral cavity, tongue   protrudes midline.   NECK:  Supple without evidence of thyromegaly.  NODES:  No peripheral adenopathy of the neck, supraclavicular fossa or axillae   bilaterally.  LUNGS:  Clear to ascultation and resonant to percussion.  HEART:  Regular rate and rhythm.  No murmur appreciated  BREAST: Right breast without palpable abnormality. Left breast mild induration deep to the lumpectomy scar.  ABDOMEN:  Soft. No evidence of hepatosplenomegaly.  Positive bowel sounds.  EXTREMITIES:  Without Edema.  NEUROLOGIC:  Cranial nerves II through XII were intact.  Strength is 5/5 in   lower extremities bilaterally.  DTRs were symmetrical.  There was no focal   sensory deficit appreciated.    LABORATORY DATA:   Lab Results   Component Value Date/Time    SODIUM 139 05/21/2017 05:30 PM    POTASSIUM 4.0 05/21/2017 05:30 PM    CHLORIDE 104 05/21/2017 05:30 PM    CO2 27 05/21/2017 05:30 PM    GLUCOSE 107 (H) 05/21/2017 05:30 PM    BUN 12 05/21/2017 05:30 PM    CREATININE 0.64 05/21/2017 05:30 PM    BUNCREATRAT 26 (H) 04/14/2014 03:00 PM     Lab Results   Component Value Date/Time    ALKPHOSPHAT 65 05/21/2017 05:30 PM    ASTSGOT 41 05/21/2017 05:30 PM    ALTSGPT 25 05/21/2017 05:30 PM    TBILIRUBIN 0.4 05/21/2017 05:30 PM      Lab Results   Component Value Date/Time    WBC 5.2 05/21/2017 05:30 PM    RBC 4.32 05/21/2017 05:30 PM    HEMOGLOBIN 12.9 05/21/2017 05:30 PM    HEMATOCRIT 39.3 " 05/21/2017 05:30 PM    MCV 91.0 05/21/2017 05:30 PM    MCH 29.9 05/21/2017 05:30 PM    MCHC 32.8 (L) 05/21/2017 05:30 PM    MPV 8.8 (L) 05/21/2017 05:30 PM    NEUTSPOLYS 52.10 10/26/2016 08:00 AM    LYMPHOCYTES 32.70 10/26/2016 08:00 AM    MONOCYTES 7.80 10/26/2016 08:00 AM    EOSINOPHILS 6.10 10/26/2016 08:00 AM    BASOPHILS 1.10 10/26/2016 08:00 AM        RADIOLOGY DATA:  MA MAMMO DIAGNOSTIC UNILAT WITH CONSTANTINO WITH CAD LEFT    Result Date: 9/11/2017 9/11/2017 10:43 AM HISTORY/REASON FOR EXAM:  hx of breast cancer with brachytherapy TECHNIQUE/EXAM DESCRIPTION AND NUMBER OF VIEWS: Unilateral LEFT tomosynthesis diagnostic mammography was performed with standard mammographic images generated from the data set. Images were reviewed and interpreted with CAD. COMPARISON:   1/10/2017, 12/12/2016 FINDINGS: The left breast tissue is heterogeneously dense. Postoperative architectural distortion is present in the central left breast. Benign-appearing calcifications are present.     Posttreatment changes in the left breast. No new suspicious findings identified. The patient is due for annual bilateral mammographic follow-up in December 2017. These results were given to the patient at the time of visit. R2 - Category 2:  Benign Finding(s)      IMPRESSION:    A 62 y.o. with stage IA receptor positive breast cancer on Aromasin clinically without evidence of disease.    RECOMMENDATIONS:   Reassured patient. Her mammography indicates that she has dense breast tissue. Did ask her to follow-up with Dr. Dutta to see if additional imaging would be necessary specifically MRI. Did explain to patient that MRI is very sensitive test and therefore likely to  additional findings in the breast which may result in biopsies for benign disease.    At this point, I'll turnover routine follow-up care to Rose Dutta and Roopa and see her back on an as-needed basis.    25 minutes was spent face-to-face with patient in the office and more  than half of that time was spent counseling patient or coordinating care as described above.    Thank you for the opportunity to participate in her care.  If any questions or comments, please do not hesitate in calling.    Shannon PILLAI M.D.  Electronically signed by: Shannon Rincon V, 9/21/2017 11:25 AM  [phone]

## 2017-09-21 NOTE — NON-PROVIDER
"Patient was seen today in clinic with Dr. Rincon for Follow up.  Vitals signs and weight were obtained and pain assessment was completed.  Allergies and medications were reviewed with the patient.  Review of systems completed.     Vitals/Pain:  Vitals:    09/21/17 1057   BP: 119/81   Pulse: 87   Temp: 36.9 °C (98.5 °F)   SpO2: 96%   Weight: 46.2 kg (101 lb 14.4 oz)   Height: 1.575 m (5' 2\")     Pain Score: 4=Slight-Moderate Pain  Location: Back      Allergies:   Contrast media with iodine [iodine]; Cherry; Latex; Oxycontin [oxycodone hcl]; Cherry; Contrast media with iodine [iodine]; Demerol; Meperidine; Nsaids; and Tape    Current Medications:  Current Outpatient Prescriptions   Medication Sig Dispense Refill   • hydrocodone-acetaminophen (NORCO) 5-325 MG Tab per tablet      • exemestane (AROMASIN) 25 MG tablet      • tizanidine (ZANAFLEX) 4 MG capsule      • zolpidem (AMBIEN) 10 MG Tab TAKE 1 TABLET BY MOUTH AT BEDTIME FOR SLEEP 30 Tab 2   • duloxetine (CYMBALTA) 30 MG Cap DR Particles Take 30 mg by mouth every bedtime.     • pregabalin (LYRICA) 25 MG Cap Take 25 mg by mouth every morning.     • cyanocobalamin (VITAMIN B-12) 500 MCG Tab Take 500 mcg by mouth every day.     • Cholecalciferol (VITAMIN D3) 2000 UNITS Tab Take 1 Tab by mouth 2 Times a Day.     • Omega-3 Fatty Acids (FISH OIL) 1000 MG Cap capsule Take 1,000 mg by mouth every day.     • CALCIUM & MAGNESIUM CARBONATES PO Take 1 Tab by mouth every day.     • DHEA 25 MG Tab Take 25 mg by mouth every day.     • oxycodone immediate release (ROXICODONE) 5 MG Tab Take 1 Tab by mouth every 3 hours as needed for Moderate Pain ((NRS Pain Scale 4-6; CPOT Pain Scale 3-5)). 30 Tab 0   • anastrozole (ARIMIDEX) 1 MG Tab Take 1 mg by mouth every day.     • ascorbic acid (ASCORBIC ACID) 500 MG Tab Take 500 mg by mouth every day.     • tizanidine (ZANAFLEX) 4 MG Tab Take 4 mg by mouth at bedtime as needed.       No current facility-administered medications for this " encounter.        PCP:  Roopa Ball, Med Ass't  9/21/2017  11:02 AM

## 2017-10-03 ENCOUNTER — OFFICE VISIT (OUTPATIENT)
Dept: OTHER | Facility: IMAGING CENTER | Age: 62
End: 2017-10-03
Payer: COMMERCIAL

## 2017-10-03 DIAGNOSIS — M54.31 RIGHT SIDED SCIATICA: ICD-10-CM

## 2017-10-03 DIAGNOSIS — M54.2 CERVICALGIA: ICD-10-CM

## 2017-10-03 DIAGNOSIS — M25.551 CHRONIC RIGHT HIP PAIN: Primary | ICD-10-CM

## 2017-10-03 DIAGNOSIS — G89.29 CHRONIC RIGHT HIP PAIN: Primary | ICD-10-CM

## 2017-10-03 DIAGNOSIS — F51.01 PRIMARY INSOMNIA: ICD-10-CM

## 2017-10-03 PROCEDURE — 99213 OFFICE O/P EST LOW 20 MIN: CPT | Mod: 25 | Performed by: FAMILY MEDICINE

## 2017-10-03 PROCEDURE — 97813 ACUP 1/> W/ESTIM 1ST 15 MIN: CPT | Performed by: FAMILY MEDICINE

## 2017-10-03 PROCEDURE — 97811 ACUP 1/> W/O ESTIM EA ADD 15: CPT | Performed by: FAMILY MEDICINE
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
## 2017-10-04 NOTE — PATIENT INSTRUCTIONS
The side effects of Acupuncture needle insertion include: minor bruising, bleeding, or pain at the site of needle insertion.  If more worrisome symptoms, such as continued bleeding, severe bruising, or continue pain or altered sensation persist, please contact [facility] office @ [phone]

## 2017-10-31 ENCOUNTER — OFFICE VISIT (OUTPATIENT)
Dept: OTHER | Facility: IMAGING CENTER | Age: 62
End: 2017-10-31
Payer: COMMERCIAL

## 2017-10-31 DIAGNOSIS — F51.01 PRIMARY INSOMNIA: Primary | ICD-10-CM

## 2017-10-31 DIAGNOSIS — M25.551 CHRONIC RIGHT HIP PAIN: ICD-10-CM

## 2017-10-31 DIAGNOSIS — G89.29 CHRONIC RIGHT HIP PAIN: ICD-10-CM

## 2017-10-31 PROCEDURE — 97810 ACUP 1/> WO ESTIM 1ST 15 MIN: CPT | Performed by: FAMILY MEDICINE

## 2017-10-31 PROCEDURE — 97811 ACUP 1/> W/O ESTIM EA ADD 15: CPT | Performed by: FAMILY MEDICINE

## 2017-10-31 PROCEDURE — 99213 OFFICE O/P EST LOW 20 MIN: CPT | Mod: 25 | Performed by: FAMILY MEDICINE

## 2017-10-31 NOTE — PATIENT INSTRUCTIONS
The side effects of Acupuncture needle insertion include: minor bruising, bleeding, or pain at the site of needle insertion.  If more worrisome symptoms, such as continued bleeding, severe bruising, or continue pain or altered sensation persist, please contact [facility] @ [phone]
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
## 2017-12-29 ENCOUNTER — HOSPITAL ENCOUNTER (OUTPATIENT)
Dept: RADIOLOGY | Facility: MEDICAL CENTER | Age: 62
End: 2017-12-29
Attending: INTERNAL MEDICINE
Payer: COMMERCIAL

## 2017-12-29 DIAGNOSIS — Z12.31 SCREENING MAMMOGRAM, ENCOUNTER FOR: ICD-10-CM

## 2017-12-29 PROCEDURE — G0202 SCR MAMMO BI INCL CAD: HCPCS

## 2018-01-18 ENCOUNTER — APPOINTMENT (OUTPATIENT)
Dept: OTHER | Facility: IMAGING CENTER | Age: 63
End: 2018-01-18

## 2018-01-25 ENCOUNTER — APPOINTMENT (OUTPATIENT)
Dept: OTHER | Facility: IMAGING CENTER | Age: 63
End: 2018-01-25

## 2018-02-01 ENCOUNTER — APPOINTMENT (OUTPATIENT)
Dept: OTHER | Facility: IMAGING CENTER | Age: 63
End: 2018-02-01

## 2018-02-01 ENCOUNTER — OFFICE VISIT (OUTPATIENT)
Dept: INTERNAL MEDICINE | Facility: IMAGING CENTER | Age: 63
End: 2018-02-01
Payer: COMMERCIAL

## 2018-02-01 VITALS
BODY MASS INDEX: 19.14 KG/M2 | WEIGHT: 104 LBS | HEIGHT: 62 IN | SYSTOLIC BLOOD PRESSURE: 108 MMHG | DIASTOLIC BLOOD PRESSURE: 66 MMHG | RESPIRATION RATE: 14 BRPM | HEART RATE: 88 BPM | OXYGEN SATURATION: 96 % | TEMPERATURE: 98.6 F

## 2018-02-01 DIAGNOSIS — M25.50 ARTHRALGIA, UNSPECIFIED JOINT: ICD-10-CM

## 2018-02-01 DIAGNOSIS — G89.29 CHRONIC LEFT SHOULDER PAIN: ICD-10-CM

## 2018-02-01 DIAGNOSIS — M25.512 CHRONIC LEFT SHOULDER PAIN: ICD-10-CM

## 2018-02-01 DIAGNOSIS — Z00.00 HEALTH CARE MAINTENANCE: ICD-10-CM

## 2018-02-01 PROCEDURE — 99214 OFFICE O/P EST MOD 30 MIN: CPT | Performed by: FAMILY MEDICINE
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
## 2018-02-05 ENCOUNTER — HOSPITAL ENCOUNTER (OUTPATIENT)
Facility: MEDICAL CENTER | Age: 63
End: 2018-02-05
Attending: FAMILY MEDICINE
Payer: COMMERCIAL

## 2018-02-05 ENCOUNTER — NON-PROVIDER VISIT (OUTPATIENT)
Dept: INTERNAL MEDICINE | Facility: IMAGING CENTER | Age: 63
End: 2018-02-05
Payer: COMMERCIAL

## 2018-02-05 DIAGNOSIS — M25.50 ARTHRALGIA, UNSPECIFIED JOINT: ICD-10-CM

## 2018-02-05 DIAGNOSIS — Z01.89 ENCOUNTER FOR ROUTINE LABORATORY TESTING: ICD-10-CM

## 2018-02-05 DIAGNOSIS — Z00.00 HEALTH CARE MAINTENANCE: ICD-10-CM

## 2018-02-05 LAB
25(OH)D3 SERPL-MCNC: 22 NG/ML (ref 30–100)
ALBUMIN SERPL BCP-MCNC: 5.5 G/DL (ref 3.2–4.9)
ALBUMIN/GLOB SERPL: 2.1 G/DL
ALP SERPL-CCNC: 64 U/L (ref 30–99)
ALT SERPL-CCNC: 13 U/L (ref 2–50)
ANION GAP SERPL CALC-SCNC: 8 MMOL/L (ref 0–11.9)
AST SERPL-CCNC: 17 U/L (ref 12–45)
BASOPHILS # BLD AUTO: 1.7 % (ref 0–1.8)
BASOPHILS # BLD: 0.07 K/UL (ref 0–0.12)
BILIRUB SERPL-MCNC: 0.6 MG/DL (ref 0.1–1.5)
BUN SERPL-MCNC: 14 MG/DL (ref 8–22)
CALCIUM SERPL-MCNC: 10.2 MG/DL (ref 8.5–10.5)
CHLORIDE SERPL-SCNC: 101 MMOL/L (ref 96–112)
CHOLEST SERPL-MCNC: 303 MG/DL (ref 100–199)
CK SERPL-CCNC: 35 U/L (ref 0–154)
CO2 SERPL-SCNC: 29 MMOL/L (ref 20–33)
CREAT SERPL-MCNC: 0.67 MG/DL (ref 0.5–1.4)
CRP SERPL HS-MCNC: 0.7 MG/L (ref 0–7.5)
EOSINOPHIL # BLD AUTO: 0.38 K/UL (ref 0–0.51)
EOSINOPHIL NFR BLD: 9.3 % (ref 0–6.9)
ERYTHROCYTE [DISTWIDTH] IN BLOOD BY AUTOMATED COUNT: 41.6 FL (ref 35.9–50)
ERYTHROCYTE [SEDIMENTATION RATE] IN BLOOD BY WESTERGREN METHOD: 17 MM/HOUR (ref 0–30)
GLOBULIN SER CALC-MCNC: 2.6 G/DL (ref 1.9–3.5)
GLUCOSE SERPL-MCNC: 85 MG/DL (ref 65–99)
HCT VFR BLD AUTO: 46 % (ref 37–47)
HDLC SERPL-MCNC: 68 MG/DL
HGB BLD-MCNC: 14.7 G/DL (ref 12–16)
IMM GRANULOCYTES # BLD AUTO: 0.01 K/UL (ref 0–0.11)
IMM GRANULOCYTES NFR BLD AUTO: 0.2 % (ref 0–0.9)
LDLC SERPL CALC-MCNC: 201 MG/DL
LYMPHOCYTES # BLD AUTO: 1.4 K/UL (ref 1–4.8)
LYMPHOCYTES NFR BLD: 34.4 % (ref 22–41)
MCH RBC QN AUTO: 29.7 PG (ref 27–33)
MCHC RBC AUTO-ENTMCNC: 32 G/DL (ref 33.6–35)
MCV RBC AUTO: 92.9 FL (ref 81.4–97.8)
MONOCYTES # BLD AUTO: 0.3 K/UL (ref 0–0.85)
MONOCYTES NFR BLD AUTO: 7.4 % (ref 0–13.4)
NEUTROPHILS # BLD AUTO: 1.91 K/UL (ref 2–7.15)
NEUTROPHILS NFR BLD: 47 % (ref 44–72)
NRBC # BLD AUTO: 0 K/UL
NRBC BLD-RTO: 0 /100 WBC
PLATELET # BLD AUTO: 297 K/UL (ref 164–446)
PMV BLD AUTO: 9.6 FL (ref 9–12.9)
POTASSIUM SERPL-SCNC: 3.9 MMOL/L (ref 3.6–5.5)
PROT SERPL-MCNC: 8.1 G/DL (ref 6–8.2)
RBC # BLD AUTO: 4.95 M/UL (ref 4.2–5.4)
RHEUMATOID FACT SER IA-ACNC: <10 IU/ML (ref 0–14)
SODIUM SERPL-SCNC: 138 MMOL/L (ref 135–145)
TRIGL SERPL-MCNC: 170 MG/DL (ref 0–149)
TSH SERPL DL<=0.005 MIU/L-ACNC: 1.53 UIU/ML (ref 0.38–5.33)
WBC # BLD AUTO: 4.1 K/UL (ref 4.8–10.8)

## 2018-02-05 PROCEDURE — 86141 C-REACTIVE PROTEIN HS: CPT

## 2018-02-05 PROCEDURE — 80061 LIPID PANEL: CPT

## 2018-02-05 PROCEDURE — 86431 RHEUMATOID FACTOR QUANT: CPT

## 2018-02-05 PROCEDURE — 86038 ANTINUCLEAR ANTIBODIES: CPT

## 2018-02-05 PROCEDURE — 85652 RBC SED RATE AUTOMATED: CPT

## 2018-02-05 PROCEDURE — 82306 VITAMIN D 25 HYDROXY: CPT

## 2018-02-05 PROCEDURE — 80053 COMPREHEN METABOLIC PANEL: CPT

## 2018-02-05 PROCEDURE — 84443 ASSAY THYROID STIM HORMONE: CPT

## 2018-02-05 PROCEDURE — 82550 ASSAY OF CK (CPK): CPT

## 2018-02-05 PROCEDURE — 85025 COMPLETE CBC W/AUTO DIFF WBC: CPT

## 2018-02-06 ENCOUNTER — APPOINTMENT (OUTPATIENT)
Dept: OTHER | Facility: IMAGING CENTER | Age: 63
End: 2018-02-06

## 2018-02-06 LAB — NUCLEAR IGG SER QL IA: NORMAL

## 2018-02-12 ENCOUNTER — OFFICE VISIT (OUTPATIENT)
Dept: INTERNAL MEDICINE | Facility: IMAGING CENTER | Age: 63
End: 2018-02-12
Payer: COMMERCIAL

## 2018-02-12 VITALS
SYSTOLIC BLOOD PRESSURE: 100 MMHG | DIASTOLIC BLOOD PRESSURE: 70 MMHG | TEMPERATURE: 98.8 F | OXYGEN SATURATION: 96 % | HEART RATE: 84 BPM | WEIGHT: 104 LBS | RESPIRATION RATE: 14 BRPM | HEIGHT: 62 IN | BODY MASS INDEX: 19.14 KG/M2

## 2018-02-12 DIAGNOSIS — E78.2 MIXED DYSLIPIDEMIA: Chronic | ICD-10-CM

## 2018-02-12 DIAGNOSIS — F51.01 PRIMARY INSOMNIA: ICD-10-CM

## 2018-02-12 DIAGNOSIS — G89.29 OTHER CHRONIC PAIN: Chronic | ICD-10-CM

## 2018-02-12 DIAGNOSIS — Z85.3 HISTORY OF BREAST CANCER: ICD-10-CM

## 2018-02-12 DIAGNOSIS — F11.20 CHRONIC NARCOTIC DEPENDENCE (HCC): Chronic | ICD-10-CM

## 2018-02-12 DIAGNOSIS — E55.9 VITAMIN D DEFICIENCY: Chronic | ICD-10-CM

## 2018-02-12 DIAGNOSIS — M25.551 CHRONIC RIGHT HIP PAIN: ICD-10-CM

## 2018-02-12 DIAGNOSIS — N95.1 POST MENOPAUSAL SYNDROME: Chronic | ICD-10-CM

## 2018-02-12 DIAGNOSIS — Z00.00 ENCOUNTER FOR WELLNESS EXAMINATION: ICD-10-CM

## 2018-02-12 DIAGNOSIS — G89.29 CHRONIC RIGHT HIP PAIN: ICD-10-CM

## 2018-02-12 PROBLEM — C50.912 INFILTRATING DUCTAL CARCINOMA OF LEFT BREAST (HCC): Status: RESOLVED | Noted: 2017-01-19 | Resolved: 2018-02-12

## 2018-02-12 PROBLEM — S32.021A CLOSED STABLE BURST FRACTURE OF SECOND LUMBAR VERTEBRA (HCC): Status: RESOLVED | Noted: 2017-05-21 | Resolved: 2018-02-12

## 2018-02-12 PROCEDURE — 99396 PREV VISIT EST AGE 40-64: CPT | Performed by: FAMILY MEDICINE

## 2018-02-12 RX ORDER — VITAMIN B COMPLEX
100 TABLET ORAL EVERY MORNING
COMMUNITY

## 2018-02-12 RX ORDER — ATORVASTATIN CALCIUM 10 MG/1
10 TABLET, FILM COATED ORAL
Qty: 30 TAB | Refills: 4 | Status: SHIPPED | OUTPATIENT
Start: 2018-02-12 | End: 2018-03-30

## 2018-02-12 RX ORDER — ZOLPIDEM TARTRATE 10 MG/1
10 TABLET ORAL NIGHTLY PRN
Qty: 30 TAB | Refills: 2 | Status: SHIPPED
Start: 2018-02-12 | End: 2018-03-14

## 2018-02-12 ASSESSMENT — PATIENT HEALTH QUESTIONNAIRE - PHQ9: CLINICAL INTERPRETATION OF PHQ2 SCORE: 0

## 2018-02-12 NOTE — PROGRESS NOTES
"Chief Complaint   Patient presents with   • Pain     \"joints\"       HPI:  Patient is a 62 y.o. female established patient who presents today for her annual exam and to discuss her ongoing concern about joint pain. She recently had lab work done and would like to review these results in detail also. In regards to her joint pain, she has known chronic pain especially of R hip for which she sees Dr. Mckay regularly. He has transitioned her care to Dr. Zollinger with upcoming appointment in two weeks. She reports generalized joint pains with weather changes and denies associated joint deformities/ associated systemic symptoms. She also suffers from chronic primary insomnia and is requesting ambien refill. She has a history of breast CA on daily Aromasin treatment managed by Dr. Dutta. She also has chronic vitamin D deficiency on daily supplementation and chronic uncontrolled mixed dyslipidemia currently not on daily treatment. She sees Dr. Phipps for her GYN exams and is UTD with health care maintenance at this time.     Patient Active Problem List    Diagnosis Date Noted   • Chronic right hip pain 05/21/2017     Priority: Low   • Mixed dyslipidemia 02/12/2018   • Post menopausal syndrome 02/12/2018   • History of breast cancer 05/08/2017   • Chronic narcotic dependence (CMS-HCC) 05/05/2017   • Diverticulosis of large intestine without hemorrhage, per 12/23/16 COLONOSCOPY 01/14/2017   • Adenomatous polyp of colon, per 12/23/16 COLONOSCOPY 01/14/2017   • Atherosclerosis of right carotid artery, mild 12/15/2016   • History of opthalmic migraine 07/12/2016   • Frozen shoulder 03/08/2016   • Irritable bowel syndrome (IBS) 01/19/2016   • Family history of colon cancer    • Chronic pain 08/13/2013   • Primary insomnia    • Chronic pelvic pain in female      Past medical, surgical, family, and social history was reviewed and updated in Epic chart by me today.     Medications and allergies reviewed and updated in Epic chart " "by me today.     ROS:  Pertinent positives listed above in HPI. All other systems have been reviewed and are negative.    PE:   /70   Pulse 84   Temp 37.1 °C (98.8 °F)   Resp 14   Ht 1.562 m (5' 1.5\")   Wt 47.2 kg (104 lb)   SpO2 96%   BMI 19.33 kg/m²   Vital signs reviewed with patient.     Gen: Well developed; well nourished; no acute distress; age appropriate appearance   HEENT: Normocephalic; atraumatic; PEERLA b/l; sclera clear b/l; b/l external auditory canals WNL; b/l TM WNL; nares patent b/l; oropharynx clear; oral mucosa moist; tongue midline; dentition adequate   Neck: No adenopathy; no thyromegaly  CV: Regular rate and rhythm; S1/ S2 present; no murmur, gallop or rub noted  Pulm: No respiratory distress; clear to ascultation b/l; no wheezing or stridor noted b/l  Abd: Adequate bowel sounds noted; soft and nontender; no rebound, rigidity, nor distention  Extremities: No peripheral edema b/l LE extremities/ no clubbing nor cyanosis noted; no gross joint deformities noted upon inspection   Skin: Warm and dry; no rashes noted   Neuro: No focal deficits noted   Psych: AAOx4; mood and affect are appropriate    A/P:  1. Chronic primary insomnia  Stable/ controlled with PRN ambien use/  reviewed today, and pt educated about new law changes/safe medication use. RX faxed to pharmacy today.   - zolpidem (AMBIEN) 10 MG Tab; Take 1 Tab by mouth at bedtime as needed for Sleep for up to 30 days.  Dispense: 30 Tab; Refill: 2    2. Chronic mixed dyslipidemia  Uncontrolled/ pt has multiple perceived medication sensitivities - will initiate low dose statin therapy nightly and titrate upwards as tolerated. Pt will be due for repeat fasting lipid panel/ CK level in 3 months.   - atorvastatin (LIPITOR) 10 MG Tab; Take 1 Tab by mouth every bedtime.  Dispense: 30 Tab; Refill: 4  - LIPID PROFILE; Future  - CREATINE KINASE; Future    3. History of breast cancer  Stable/ pt to continue daily Aromasin treatment " managed by Dr. Dutta.     4. Chronic vitamin D deficiency\  Uncontrolled/ pt to increase daily vitamin D3 supplementation to 5000 IU daily.     5. Chronic right hip pain  Stable/ pt is transitioning pain management care from Dr. Mckay to Dr. Zollinger with upcoming appointment.     6. Post menopausal syndrome  Ongoing decreased libido - unable to use HRT due to breast CA history    7. Other chronic pain  Labs WNL with no source of underlying joint pain cause. I strongly feel that this is related to OA and encouraged her to discuss it with Dr. Zollinger.     8. Chronic narcotic dependence (CMS-HCC)  Stable/ managed previously by Dr. Mckay - upcoming appointment with Dr. Zollinger    9. Encounter for wellness examination  Pt UTD with health care maintenance issues at this time and may transition her GYN care to our office this year.     Pt is to return in 3 months for repeat fasting labs/ PRN sooner if current condition changes.

## 2018-02-13 ENCOUNTER — APPOINTMENT (OUTPATIENT)
Dept: OTHER | Facility: IMAGING CENTER | Age: 63
End: 2018-02-13

## 2018-02-25 DIAGNOSIS — F51.01 PRIMARY INSOMNIA: ICD-10-CM

## 2018-02-26 RX ORDER — ZOLPIDEM TARTRATE 10 MG/1
TABLET ORAL
Refills: 2 | OUTPATIENT
Start: 2018-02-26

## 2018-02-27 ENCOUNTER — APPOINTMENT (OUTPATIENT)
Dept: OTHER | Facility: IMAGING CENTER | Age: 63
End: 2018-02-27

## 2018-03-30 ENCOUNTER — HOSPITAL ENCOUNTER (OUTPATIENT)
Dept: RADIOLOGY | Facility: MEDICAL CENTER | Age: 63
End: 2018-03-30
Attending: FAMILY MEDICINE
Payer: COMMERCIAL

## 2018-03-30 ENCOUNTER — OFFICE VISIT (OUTPATIENT)
Dept: INTERNAL MEDICINE | Facility: IMAGING CENTER | Age: 63
End: 2018-03-30
Payer: COMMERCIAL

## 2018-03-30 VITALS
SYSTOLIC BLOOD PRESSURE: 106 MMHG | RESPIRATION RATE: 14 BRPM | HEIGHT: 61 IN | TEMPERATURE: 99 F | OXYGEN SATURATION: 98 % | WEIGHT: 107 LBS | BODY MASS INDEX: 20.2 KG/M2 | DIASTOLIC BLOOD PRESSURE: 60 MMHG | HEART RATE: 80 BPM

## 2018-03-30 DIAGNOSIS — E78.2 MIXED DYSLIPIDEMIA: Chronic | ICD-10-CM

## 2018-03-30 DIAGNOSIS — M25.561 ACUTE PAIN OF RIGHT KNEE: ICD-10-CM

## 2018-03-30 PROCEDURE — 99214 OFFICE O/P EST MOD 30 MIN: CPT | Performed by: FAMILY MEDICINE

## 2018-03-30 PROCEDURE — 73564 X-RAY EXAM KNEE 4 OR MORE: CPT | Mod: RT

## 2018-03-31 RX ORDER — ACETAMINOPHEN 160 MG
2000 TABLET,DISINTEGRATING ORAL EVERY MORNING
COMMUNITY
End: 2023-02-01

## 2018-03-31 NOTE — PROGRESS NOTES
Chief Complaint   Patient presents with   • Knee Pain     right       HPI:  Patient is a 62 y.o. female established patient who presents today for evaluation of new R knee pain sustained after a MGLF while on vacation in [facility]. Four days ago, she stepped out of a restaurant and fell directly on both knees with resultant R knee pain and small abrasions. She has been icing her knee with elevation and reports improvement until she had a long travel day yesterday which flared up her knee pain. Today is a better day for her in terms of knee pain and mobility at our visit. She also reports that she could not tolerate Atorvastatin use due to nausea and diarrhea but continues to take daily fish oil for chronic uncontrolled mixed dyslipidemia. She is otherwise doing well at this time.     Patient Active Problem List    Diagnosis Date Noted   • Chronic right hip pain 05/21/2017     Priority: Low   • Mixed dyslipidemia 02/12/2018   • Post menopausal syndrome 02/12/2018   • Vitamin D deficiency 02/12/2018   • History of breast cancer 05/08/2017   • Chronic narcotic dependence (CMS-HCC) 05/05/2017   • Diverticulosis of large intestine without hemorrhage, per 12/23/16 COLONOSCOPY 01/14/2017   • Adenomatous polyp of colon, per 12/23/16 COLONOSCOPY 01/14/2017   • Atherosclerosis of right carotid artery, mild 12/15/2016   • History of opthalmic migraine 07/12/2016   • Frozen shoulder 03/08/2016   • Irritable bowel syndrome (IBS) 01/19/2016   • Family history of colon cancer    • Chronic pain 08/13/2013   • Primary insomnia    • Chronic pelvic pain in female        Past medical, surgical, family, and social history was reviewed and updated in Epic chart by me today.     Medications and allergies reviewed and updated in Epic chart by me today.     ROS:  Pertinent positives listed above in HPI. All other systems have been reviewed and are negative.    PE:   /60   Pulse 80   Temp 37.2 °C (99 °F)   Resp 14   Ht 1.562 m (5'  "1.5\")   Wt 48.5 kg (107 lb)   SpO2 98%   BMI 19.89 kg/m²   Vital signs reviewed with patient.     Gen: Well developed; well nourished; very thin build; no acute distress; age appropriate appearance   CV: Regular rate and rhythm; S1/ S2 present; no murmur, gallop or rub noted  Pulm: No respiratory distress; clear to ascultation b/l; no wheezing or stridor noted b/l  Abd: Adequate bowel sounds noted; soft and nontender; no rebound, rigidity, nor distention   Extremities: No peripheral edema b/l LE extremities/ no clubbing nor cyanosis noted: small scabs noted on bilateral knee caps; R knee; no gross deformity but very mild edema on medial aspect of knee/ associated mild focal pain on medial aspect but no joint laxity noted with exam; full PROM/AROM noted  Skin: Warm and dry; no rashes noted   Neuro: No focal deficits noted; gait mildly affected by knee pain  Psych: AAOx4; mood and affect are appropriate    A/P:  1. Acute pain of right knee  Improving slowly/ Recommend that patient continue to ice knee multiple times per day and elevate/ will check CXR to ensure no bone/joint abnormality.   - DX-KNEE COMPLETE 4+ RIGHT; Future    2. Mixed dyslipidemia  Uncontrolled/ She did not tolerate Atorvastatin due to nausea and diarrhea/ Recommend she continue daily fish oil and and add red yeast rice to daily routine. Recommend that she repeat fasting lipid panel in August.    I will be in touch with patient regarding xray results later today when available.                "

## 2018-06-29 ENCOUNTER — NON-PROVIDER VISIT (OUTPATIENT)
Dept: INTERNAL MEDICINE | Facility: IMAGING CENTER | Age: 63
End: 2018-06-29
Payer: COMMERCIAL

## 2018-06-29 VITALS
DIASTOLIC BLOOD PRESSURE: 70 MMHG | TEMPERATURE: 99 F | BODY MASS INDEX: 18.69 KG/M2 | HEART RATE: 97 BPM | HEIGHT: 61 IN | WEIGHT: 99 LBS | SYSTOLIC BLOOD PRESSURE: 90 MMHG | OXYGEN SATURATION: 96 % | RESPIRATION RATE: 14 BRPM

## 2018-06-29 DIAGNOSIS — G89.29 OTHER CHRONIC PAIN: Chronic | ICD-10-CM

## 2018-06-29 DIAGNOSIS — F11.20 CHRONIC NARCOTIC DEPENDENCE (HCC): Chronic | ICD-10-CM

## 2018-06-29 DIAGNOSIS — F51.01 PRIMARY INSOMNIA: Chronic | ICD-10-CM

## 2018-06-29 PROCEDURE — 99214 OFFICE O/P EST MOD 30 MIN: CPT | Performed by: FAMILY MEDICINE

## 2018-06-29 RX ORDER — ZOLPIDEM TARTRATE 10 MG/1
10 TABLET ORAL NIGHTLY PRN
Qty: 30 TAB | Refills: 2 | Status: SHIPPED
Start: 2018-06-29 | End: 2018-07-29

## 2018-06-29 RX ORDER — ZOLPIDEM TARTRATE 10 MG/1
10 TABLET ORAL NIGHTLY PRN
Qty: 30 TAB | Refills: 2 | Status: SHIPPED
Start: 2018-06-29 | End: 2018-06-29 | Stop reason: SDUPTHER

## 2018-06-29 NOTE — PROGRESS NOTES
Chief Complaint   Patient presents with   • Insomnia   • Chronic Opiate Therapy     unhappy with new pain management doctor       HPI:  Patient is a 62 y.o. female established patient who presents today for medication refill to chronic primary insomnia. She continues to take ambien qhs without reported side effects and is using medication as prescribed. Since her last visit with me, she has been seeing Dr. Cleaning for wellness medicine management, and he has given her numerous supplements, both oral and via injection, and started her on low dose naltrexone for chronic pain management adjunct. She reports increased sleep satisfaction with use of ambien and LDN and has also started taking nature thyroid (titrating dose managed by Dr. Cleaning). She has chronic pain and chronic narcotic dependence and has been seeing Dr. Leo at [facility] due to Dr. Mckay' MCC. She is unhappy with her management with Dr. Leo and would like to obtain [facility] pain management evaluation. We also noted that she has lost 8 lbs since her visit in March and she reports exercising daily/ eating plant based diet. She has cut out sugar per recommendation of Dr. Cleaning and is not focused on weight loss. She denies changes in her other chronic medical conditions at this time.     Patient Active Problem List    Diagnosis Date Noted   • Chronic right hip pain 05/21/2017     Priority: Low   • Body mass index (BMI) 19.9 or less, adult 06/29/2018   • Mixed dyslipidemia 02/12/2018   • Post menopausal syndrome 02/12/2018   • Vitamin D deficiency 02/12/2018   • History of breast cancer 05/08/2017   • Chronic narcotic dependence (HCC) 05/05/2017   • Diverticulosis of large intestine without hemorrhage, per 12/23/16 COLONOSCOPY 01/14/2017   • Adenomatous polyp of colon, per 12/23/16 COLONOSCOPY 01/14/2017   • Atherosclerosis of right carotid artery, mild 12/15/2016   • History of opthalmic migraine 07/12/2016   • Frozen shoulder  "03/08/2016   • Irritable bowel syndrome (IBS) 01/19/2016   • Family history of colon cancer    • Chronic pain 08/13/2013   • Primary insomnia    • Chronic pelvic pain in female      Past medical, surgical, family, and social history was reviewed in Epic chart by me today.     Medications and allergies reviewed and updated in Epic chart by me today.     ROS:  Pertinent positives listed above in HPI. All other systems have been reviewed and are negative.    PE:   BP (!) 90/70   Pulse 97   Temp 37.2 °C (99 °F)   Resp 14   Ht 1.562 m (5' 1.5\")   Wt 44.9 kg (99 lb)   SpO2 96%   BMI 18.41 kg/m²   Vital signs reviewed with patient.     Gen: Well developed; very thin; no acute distress; age appropriate appearance   CV: Regular rate and rhythm; S1/ S2 present; no murmur, gallop or rub noted  Pulm: No respiratory distress; clear to ascultation b/l; no wheezing or stridor noted b/l  Abd: Adequate bowel sounds noted; soft and nontender; no rebound, rigidity, nor distention  Extremities: No peripheral edema b/l LE extremities/ no clubbing nor cyanosis noted  Skin: Warm and dry; no rashes noted   Neuro: No focal deficits noted   Psych: AAOx4; mood and affect are baseline    A/P:  1. Chronic primary insomnia  Stable/ pt is to continue qhs ambien use for sleep control/  reviewed today and no concerns noted. Pt is well versed in safe use of controlled medication, and benefit of use outweighs risk at this time.   New RX sent to pharmacy today.   - zolpidem (AMBIEN) 10 MG Tab; Take 1 Tab by mouth at bedtime as needed for Sleep for up to 30 days.  Dispense: 30 Tab; Refill: 2    2. Other chronic pain  Stable/ Pt is currently seeing Dr. Leo at [place] and would like another pain management evaluation. Will refer pt to [facility] and provided pt with contact information.   - REFERRAL TO PAIN CLINIC    3. Chronic narcotic dependence (HCC)  Stable/ Currently managed by Dr. Leo/ Refer to #2  - REFERRAL TO PAIN " CLINIC    4. Body mass index (BMI) 19.9 or less, adult  Worsening/ I expressed concern to patient about her weight loss. Recommend that she focus on calorie dense food choices and monitor weight at home.     Pt is to follow up with me in three months/ PRN sooner if current condition worsens.

## 2018-09-13 ENCOUNTER — OFFICE VISIT (OUTPATIENT)
Dept: INTERNAL MEDICINE | Facility: IMAGING CENTER | Age: 63
End: 2018-09-13
Payer: COMMERCIAL

## 2018-09-13 VITALS
DIASTOLIC BLOOD PRESSURE: 64 MMHG | WEIGHT: 99 LBS | SYSTOLIC BLOOD PRESSURE: 102 MMHG | BODY MASS INDEX: 18.69 KG/M2 | TEMPERATURE: 96.8 F | HEART RATE: 91 BPM | HEIGHT: 61 IN | OXYGEN SATURATION: 96 % | RESPIRATION RATE: 14 BRPM

## 2018-09-13 DIAGNOSIS — L30.9 DERMATITIS: ICD-10-CM

## 2018-09-13 PROCEDURE — 99213 OFFICE O/P EST LOW 20 MIN: CPT | Performed by: FAMILY MEDICINE

## 2018-09-13 NOTE — PROGRESS NOTES
Chief Complaint   Patient presents with   • Rash     lesions both arms       HISTORY OF PRESENT ILLNESS: Patient is a 63 y.o. female established patient who presents today complaining of a rash in the antecubital fossa area of both arms that she noticed a few days ago.  She returned 4 days ago from [place].  She noticed a rash on her antecubital fossa's of both arms.  She states it did not itch.  Denies any new contacts.  No bug bites.  She does not think she was doing anything different.  No bruising.  No other lesions.  She has not been using any medications or creams on them.  Both lesions have faded about 50% over the past couple of days.      Patient Active Problem List    Diagnosis Date Noted   • Chronic right hip pain 05/21/2017     Priority: Low   • Body mass index (BMI) 19.9 or less, adult 06/29/2018   • Mixed dyslipidemia 02/12/2018   • Post menopausal syndrome 02/12/2018   • Vitamin D deficiency 02/12/2018   • History of breast cancer 05/08/2017   • Chronic narcotic dependence (HCC) 05/05/2017   • Diverticulosis of large intestine without hemorrhage, per 12/23/16 COLONOSCOPY 01/14/2017   • Adenomatous polyp of colon, per 12/23/16 COLONOSCOPY 01/14/2017   • Atherosclerosis of right carotid artery, mild 12/15/2016   • History of opthalmic migraine 07/12/2016   • Frozen shoulder 03/08/2016   • Irritable bowel syndrome (IBS) 01/19/2016   • Family history of colon cancer    • Chronic pain 08/13/2013   • Primary insomnia    • Chronic pelvic pain in female      Current Outpatient Prescriptions on File Prior to Visit   Medication Sig Dispense Refill   • Naltrexone HCl Powder Take 1.5 mg by mouth every day.     • thyroid (ARMOUR THYROID) 30 MG Tab Take 30 mg by mouth every day.     • Cholecalciferol (VITAMIN D3) 5000 units Cap Take 1 Cap by mouth every day.     • Non Formulary Request Take 1 Dose by mouth every day.     • RESVERATROL PO Take 1 Dose by mouth every day.     • TURMERIC PO  "Take 1 Dose by mouth every day.     • Coenzyme Q10 (COQ10) 100 MG Cap Take 1 Dose by mouth every day.     • hydrocodone-acetaminophen (NORCO) 5-325 MG Tab per tablet      • exemestane (AROMASIN) 25 MG tablet      • duloxetine (CYMBALTA) 30 MG Cap DR Particles Take 30 mg by mouth every bedtime.     • pregabalin (LYRICA) 25 MG Cap Take 25 mg by mouth every morning.     • ascorbic acid (ASCORBIC ACID) 500 MG Tab Take 500 mg by mouth every day.     • cyanocobalamin (VITAMIN B-12) 500 MCG Tab Take 500 mcg by mouth every day.     • Omega-3 Fatty Acids (FISH OIL) 1000 MG Cap capsule Take 1,000 mg by mouth every day.     • CALCIUM & MAGNESIUM CARBONATES PO Take 1 Tab by mouth every day.     • tizanidine (ZANAFLEX) 4 MG Tab Take 4 mg by mouth at bedtime as needed.       No current facility-administered medications on file prior to visit.          Past medical, surgical, family, and social history is reviewed and updated in Epic chart by me today.   Medications and allergies reviewed and updated in Epic chart by me today.     REVIEW OF SYSTEMS:  GENERAL: mild fatigue from travel, no weight loss.  CV:  No chest pain,dyspnea,palpitations or edema.  RESP:  No sob,cough,wheezing or hemoptysis.  GI: No dysphagia, heartburn,abdominal pain, nausea, vomiting, diarrhea or constipation.       No melena, jaundice, bleeding, incontinence or change in bowel habits.  :  No dysuria, polyuria, hematuria, incontinence, or nocturia.  MS:  No joint swelling, myalgias, or arthralgias.  NEURO:  No seizures, syncope, paralysis, tremor, or weakness.  SKIN: as above  PSYCH: Mood fine.    Vitals:    09/13/18 1000   BP: 102/64   Pulse: 91   Resp: 14   Temp: 36 °C (96.8 °F)   SpO2: 96%   Weight: 44.9 kg (99 lb)   Height: 1.562 m (5' 1.5\")     Physical Exam:  Gen: Well developed, well nourished. No acute distress.  Neck:  Supple, no adenopathy or thyromegaly.  Heart:  Regular rate and rhythm.  Normal S1, S2. No murmur, gallop or rub.  Lungs:  Clear, " No wheezes,rales or rhonchi.  Extremities:  No edema.  Skin: She does have some hyperpigmented areas in both antecubital fossa.  They are both approximately 4 cm in diameter.  No ecchymosis.  No petechiae.  Psych: Mood and affect are appropriate.        Assessment/Plan:  1. Dermatitis     Uncertain etiology.  However,  these lesions are fading.  We will have her continue monitoring.  If they do persist she is to call and be reevaluated.

## 2019-01-14 ENCOUNTER — OFFICE VISIT (OUTPATIENT)
Dept: INTERNAL MEDICINE | Facility: IMAGING CENTER | Age: 64
End: 2019-01-14
Payer: COMMERCIAL

## 2019-01-14 VITALS
TEMPERATURE: 97.7 F | HEART RATE: 95 BPM | SYSTOLIC BLOOD PRESSURE: 114 MMHG | BODY MASS INDEX: 18.22 KG/M2 | DIASTOLIC BLOOD PRESSURE: 80 MMHG | HEIGHT: 62 IN | WEIGHT: 99 LBS | RESPIRATION RATE: 12 BRPM | OXYGEN SATURATION: 94 %

## 2019-01-14 DIAGNOSIS — F51.01 PRIMARY INSOMNIA: Chronic | ICD-10-CM

## 2019-01-14 DIAGNOSIS — N95.1 POST MENOPAUSAL SYNDROME: Chronic | ICD-10-CM

## 2019-01-14 DIAGNOSIS — K58.1 IRRITABLE BOWEL SYNDROME WITH CONSTIPATION: Chronic | ICD-10-CM

## 2019-01-14 DIAGNOSIS — Z00.00 WELLNESS EXAMINATION: ICD-10-CM

## 2019-01-14 DIAGNOSIS — E78.2 MIXED DYSLIPIDEMIA: Chronic | ICD-10-CM

## 2019-01-14 DIAGNOSIS — E55.9 VITAMIN D DEFICIENCY: Chronic | ICD-10-CM

## 2019-01-14 DIAGNOSIS — F11.20 CHRONIC NARCOTIC DEPENDENCE (HCC): Chronic | ICD-10-CM

## 2019-01-14 DIAGNOSIS — Z23 NEED FOR INFLUENZA VACCINATION: ICD-10-CM

## 2019-01-14 DIAGNOSIS — Z12.31 ENCOUNTER FOR SCREENING MAMMOGRAM FOR BREAST CANCER: ICD-10-CM

## 2019-01-14 DIAGNOSIS — G89.29 OTHER CHRONIC PAIN: Chronic | ICD-10-CM

## 2019-01-14 PROCEDURE — 90686 IIV4 VACC NO PRSV 0.5 ML IM: CPT | Performed by: FAMILY MEDICINE

## 2019-01-14 PROCEDURE — 90471 IMMUNIZATION ADMIN: CPT | Performed by: FAMILY MEDICINE

## 2019-01-14 PROCEDURE — 99396 PREV VISIT EST AGE 40-64: CPT | Mod: 25 | Performed by: FAMILY MEDICINE

## 2019-01-14 RX ORDER — ZOLPIDEM TARTRATE 10 MG/1
10 TABLET ORAL NIGHTLY PRN
Qty: 30 TAB | Refills: 2 | Status: SHIPPED
Start: 2019-01-14 | End: 2019-02-13

## 2019-01-14 NOTE — PROGRESS NOTES
Chief Complaint   Patient presents with   • Annual Exam       HPI:  Patient is a 63 y.o. female established patient who presents today for her annual wellness exam. She has chronic narcotic dependence and chronic joint pain currently being managed by Dr. Patricio. She reports having a good relationship with him and is currently doing day-long ketamine infusions every week at his office (2/8 completed). She reports increased constipation from ketamine infusions but denies chronic IBS flairs. She continues to see Dr. Cleaning for wellness medicine guidance and continues to take daily Aromasin for history of breast CA (managed by Dr. Dutta). She also suffers from chronic primary insomnia and is requesting ambien refill. She uses this controlled medication very infrequently now (reports increased sleep habits since Dr. Patricio has been involved in her care) but uses it in a responsible fashion.She has chronic vitamin D deficiency on daily supplementation and chronic uncontrolled mixed dyslipidemia currently not on daily treatment. She sees Dr. Phipps for her GYN exams and is overdue for screening mammogram/ flu vaccine. She remains in good spirits and denies other health changes at this time.     Patient Active Problem List    Diagnosis Date Noted   • Chronic right hip pain 05/21/2017     Priority: Low   • Body mass index (BMI) 19.9 or less, adult 06/29/2018   • Mixed dyslipidemia 02/12/2018   • Post menopausal syndrome 02/12/2018   • Vitamin D deficiency 02/12/2018   • History of breast cancer 05/08/2017   • Chronic narcotic dependence (HCC) 05/05/2017   • Diverticulosis of large intestine without hemorrhage, per 12/23/16 COLONOSCOPY 01/14/2017   • Adenomatous polyp of colon, per 12/23/16 COLONOSCOPY 01/14/2017   • Atherosclerosis of right carotid artery, mild 12/15/2016   • History of opthalmic migraine 07/12/2016   • Frozen shoulder 03/08/2016   • Irritable bowel syndrome (IBS) 01/19/2016   • Family history of colon  "cancer    • Chronic pain 08/13/2013   • Primary insomnia    • Chronic pelvic pain in female        Past medical, surgical, family, and social history was reviewed and updated in Epic chart by me today.     Medications and allergies reviewed and updated in Epic chart by me today.     ROS:  Pertinent positives listed above in HPI. All other systems have been reviewed and are negative.    PE:   /80 (BP Location: Right arm, Patient Position: Sitting)   Pulse 95   Temp 36.5 °C (97.7 °F) (Temporal)   Resp 12   Ht 1.562 m (5' 1.5\")   Wt 44.9 kg (99 lb)   SpO2 94%   BMI 18.40 kg/m²   Vital signs reviewed with patient.     Gen: Well developed; very thin; no acute distress; age appropriate appearance   HEENT: Normocephalic; atraumatic; PEERLA b/l; sclera clear b/l; b/l external auditory canals WNL; b/l TM WNL; nares patent; oropharynx clear; oral mucosa moist; tongue midline; dentition adequate   Neck: No adenopathy; no thyromegaly  CV: Regular rate and rhythm; S1/ S2 present; no murmur, gallop or rub noted  Pulm: No respiratory distress; clear to ascultation b/l; no wheezing or stridor noted b/l  Abd: Adequate bowel sounds noted; soft and nontender; no rebound, rigidity, nor distention  Extremities: No peripheral edema b/l LE extremities/ no clubbing nor cyanosis noted  Skin: Warm and dry; no rashes noted   Neuro: No focal deficits noted   Psych: AAOx4; mood and affect are appropriate    A/P:  1. Wellness examination  Pt is stable at this time and is due for annual fasting labs.   - CBC WITH DIFFERENTIAL; Future  - COMP METABOLIC PANEL; Future  - TSH; Future  - T3 FREE; Future  - FREE THYROXINE; Future  - HEMOGLOBIN A1C; Future    2. Need for influenza vaccination  Vaccine administered at visit today.  - Flu Quad Inj >3 Year Pre-Filled PF    3. Body mass index (BMI) 19.9 or less, adult  Stable/ lifelong issue for patient    4. Chronic narcotic dependence (HCC)  Improved per patient report - managed by " [facility].     5. Other chronic pain  Improved per patient report/ she is currently undergoing weekly Ketamine infusions (#2/8 completed) for additional pain management control/ managed by Dr. Patricio.     6. Irritable bowel syndrome with constipation  Currently experiencing constipation due to Ketamine infusions/ Recommended daily Miralax and increased water intake to help control this situation. Pt denies overt IBS flair.     7. Mixed dyslipidemia  Uncontrolled/ pt not interested in statin use/ due for fasting lipid panel.   - Lipid Profile; Future    8. Post menopausal syndrome  Stable/ managed by Dr. Cleaning    9. Primary insomnia  Stable/ controlled with infrequent PRN ambien use/  reviewed today and no concerns identified. New RX faxed to pharmacy today  - zolpidem (AMBIEN) 10 MG Tab; Take 1 Tab by mouth at bedtime as needed for Sleep for up to 30 days.  Dispense: 30 Tab; Refill: 2    10. Vitamin D deficiency  Stable/ pt is to continue daily vitamin D supplementation and is due for vitamin D level.  - VITAMIN D,25 HYDROXY; Future    11. Encounter for screening mammogram for breast cancer  Overdue - pt provided with number to call and schedule this important imaging exam.  - MA-SCREENING MAMMO BILAT W/TOMOSYNTHESIS W/CAD; Future    Pt is stable at this time and will return in the near future for fasting lab draw.

## 2019-01-15 ENCOUNTER — NON-PROVIDER VISIT (OUTPATIENT)
Dept: INTERNAL MEDICINE | Facility: IMAGING CENTER | Age: 64
End: 2019-01-15
Payer: COMMERCIAL

## 2019-01-15 ENCOUNTER — HOSPITAL ENCOUNTER (OUTPATIENT)
Facility: MEDICAL CENTER | Age: 64
End: 2019-01-15
Attending: FAMILY MEDICINE
Payer: COMMERCIAL

## 2019-01-15 DIAGNOSIS — E55.9 VITAMIN D DEFICIENCY: Chronic | ICD-10-CM

## 2019-01-15 DIAGNOSIS — E78.2 MIXED DYSLIPIDEMIA: Chronic | ICD-10-CM

## 2019-01-15 DIAGNOSIS — Z00.00 WELLNESS EXAMINATION: ICD-10-CM

## 2019-01-15 LAB
25(OH)D3 SERPL-MCNC: 53 NG/ML (ref 30–100)
ALBUMIN SERPL BCP-MCNC: 4.6 G/DL (ref 3.2–4.9)
ALBUMIN/GLOB SERPL: 1.8 G/DL
ALP SERPL-CCNC: 60 U/L (ref 30–99)
ALT SERPL-CCNC: 21 U/L (ref 2–50)
ANION GAP SERPL CALC-SCNC: 7 MMOL/L (ref 0–11.9)
AST SERPL-CCNC: 18 U/L (ref 12–45)
BASOPHILS # BLD AUTO: 1 % (ref 0–1.8)
BASOPHILS # BLD: 0.04 K/UL (ref 0–0.12)
BILIRUB SERPL-MCNC: 0.5 MG/DL (ref 0.1–1.5)
BUN SERPL-MCNC: 13 MG/DL (ref 8–22)
CALCIUM SERPL-MCNC: 9.5 MG/DL (ref 8.5–10.5)
CHLORIDE SERPL-SCNC: 106 MMOL/L (ref 96–112)
CHOLEST SERPL-MCNC: 231 MG/DL (ref 100–199)
CO2 SERPL-SCNC: 27 MMOL/L (ref 20–33)
CREAT SERPL-MCNC: 0.54 MG/DL (ref 0.5–1.4)
EOSINOPHIL # BLD AUTO: 0.25 K/UL (ref 0–0.51)
EOSINOPHIL NFR BLD: 6.4 % (ref 0–6.9)
ERYTHROCYTE [DISTWIDTH] IN BLOOD BY AUTOMATED COUNT: 40.2 FL (ref 35.9–50)
EST. AVERAGE GLUCOSE BLD GHB EST-MCNC: 117 MG/DL
GLOBULIN SER CALC-MCNC: 2.6 G/DL (ref 1.9–3.5)
GLUCOSE SERPL-MCNC: 93 MG/DL (ref 65–99)
HBA1C MFR BLD: 5.7 % (ref 0–5.6)
HCT VFR BLD AUTO: 42.1 % (ref 37–47)
HDLC SERPL-MCNC: 57 MG/DL
HGB BLD-MCNC: 14 G/DL (ref 12–16)
IMM GRANULOCYTES # BLD AUTO: 0 K/UL (ref 0–0.11)
IMM GRANULOCYTES NFR BLD AUTO: 0 % (ref 0–0.9)
LDLC SERPL CALC-MCNC: 127 MG/DL
LYMPHOCYTES # BLD AUTO: 0.93 K/UL (ref 1–4.8)
LYMPHOCYTES NFR BLD: 23.7 % (ref 22–41)
MCH RBC QN AUTO: 30.7 PG (ref 27–33)
MCHC RBC AUTO-ENTMCNC: 33.3 G/DL (ref 33.6–35)
MCV RBC AUTO: 92.3 FL (ref 81.4–97.8)
MONOCYTES # BLD AUTO: 0.37 K/UL (ref 0–0.85)
MONOCYTES NFR BLD AUTO: 9.4 % (ref 0–13.4)
NEUTROPHILS # BLD AUTO: 2.33 K/UL (ref 2–7.15)
NEUTROPHILS NFR BLD: 59.5 % (ref 44–72)
NRBC # BLD AUTO: 0 K/UL
NRBC BLD-RTO: 0 /100 WBC
PLATELET # BLD AUTO: 237 K/UL (ref 164–446)
PMV BLD AUTO: 9.8 FL (ref 9–12.9)
POTASSIUM SERPL-SCNC: 4 MMOL/L (ref 3.6–5.5)
PROT SERPL-MCNC: 7.2 G/DL (ref 6–8.2)
RBC # BLD AUTO: 4.56 M/UL (ref 4.2–5.4)
SODIUM SERPL-SCNC: 140 MMOL/L (ref 135–145)
T3FREE SERPL-MCNC: 3.87 PG/ML (ref 2.4–4.2)
T4 FREE SERPL-MCNC: 1.05 NG/DL (ref 0.53–1.43)
TRIGL SERPL-MCNC: 233 MG/DL (ref 0–149)
TSH SERPL DL<=0.005 MIU/L-ACNC: <0.005 UIU/ML (ref 0.38–5.33)
WBC # BLD AUTO: 3.9 K/UL (ref 4.8–10.8)

## 2019-01-15 PROCEDURE — 84443 ASSAY THYROID STIM HORMONE: CPT

## 2019-01-15 PROCEDURE — 82306 VITAMIN D 25 HYDROXY: CPT

## 2019-01-15 PROCEDURE — 80053 COMPREHEN METABOLIC PANEL: CPT

## 2019-01-15 PROCEDURE — 85025 COMPLETE CBC W/AUTO DIFF WBC: CPT

## 2019-01-15 PROCEDURE — 83036 HEMOGLOBIN GLYCOSYLATED A1C: CPT

## 2019-01-15 PROCEDURE — 80061 LIPID PANEL: CPT

## 2019-01-15 PROCEDURE — 84439 ASSAY OF FREE THYROXINE: CPT

## 2019-01-15 PROCEDURE — 84481 FREE ASSAY (FT-3): CPT

## 2019-01-16 DIAGNOSIS — E03.8 OTHER SPECIFIED HYPOTHYROIDISM: ICD-10-CM

## 2019-01-16 DIAGNOSIS — R73.9 HYPERGLYCEMIA: ICD-10-CM

## 2019-03-25 ENCOUNTER — APPOINTMENT (OUTPATIENT)
Dept: RADIOLOGY | Facility: MEDICAL CENTER | Age: 64
End: 2019-03-25
Attending: INTERNAL MEDICINE
Payer: COMMERCIAL

## 2019-03-28 ENCOUNTER — HOSPITAL ENCOUNTER (OUTPATIENT)
Dept: RADIOLOGY | Facility: MEDICAL CENTER | Age: 64
End: 2019-03-28
Attending: INTERNAL MEDICINE
Payer: COMMERCIAL

## 2019-03-28 DIAGNOSIS — D24.2 BENIGN NEOPLASM OF LEFT BREAST: ICD-10-CM

## 2019-03-28 PROCEDURE — 77080 DXA BONE DENSITY AXIAL: CPT

## 2019-04-02 ENCOUNTER — HOSPITAL ENCOUNTER (OUTPATIENT)
Dept: RADIOLOGY | Facility: MEDICAL CENTER | Age: 64
End: 2019-04-02
Attending: FAMILY MEDICINE
Payer: COMMERCIAL

## 2019-04-02 DIAGNOSIS — Z12.31 ENCOUNTER FOR SCREENING MAMMOGRAM FOR BREAST CANCER: ICD-10-CM

## 2019-04-02 PROCEDURE — 77063 BREAST TOMOSYNTHESIS BI: CPT

## 2019-04-20 ENCOUNTER — OFFICE VISIT (OUTPATIENT)
Dept: URGENT CARE | Facility: CLINIC | Age: 64
End: 2019-04-20
Payer: COMMERCIAL

## 2019-04-20 ENCOUNTER — HOSPITAL ENCOUNTER (OUTPATIENT)
Facility: MEDICAL CENTER | Age: 64
End: 2019-04-20
Attending: NURSE PRACTITIONER
Payer: COMMERCIAL

## 2019-04-20 VITALS
HEART RATE: 87 BPM | OXYGEN SATURATION: 95 % | DIASTOLIC BLOOD PRESSURE: 78 MMHG | RESPIRATION RATE: 16 BRPM | WEIGHT: 102 LBS | TEMPERATURE: 98.4 F | HEIGHT: 61 IN | SYSTOLIC BLOOD PRESSURE: 104 MMHG | BODY MASS INDEX: 19.26 KG/M2

## 2019-04-20 DIAGNOSIS — R10.84 GENERALIZED ABDOMINAL PAIN: ICD-10-CM

## 2019-04-20 DIAGNOSIS — R82.998 LEUKOCYTES IN URINE: ICD-10-CM

## 2019-04-20 DIAGNOSIS — K52.9 GASTROENTERITIS: ICD-10-CM

## 2019-04-20 PROCEDURE — 99214 OFFICE O/P EST MOD 30 MIN: CPT | Performed by: NURSE PRACTITIONER

## 2019-04-20 PROCEDURE — 87077 CULTURE AEROBIC IDENTIFY: CPT

## 2019-04-20 PROCEDURE — 87086 URINE CULTURE/COLONY COUNT: CPT

## 2019-04-20 PROCEDURE — 87186 SC STD MICRODIL/AGAR DIL: CPT

## 2019-04-20 NOTE — PATIENT INSTRUCTIONS
Viral Gastroenteritis, Adult  Viral gastroenteritis is also known as the stomach flu. This condition is caused by various viruses. These viruses can be passed from person to person very easily (are very contagious). This condition may affect your stomach, small intestine, and large intestine. It can cause sudden watery diarrhea, fever, and vomiting.  Diarrhea and vomiting can make you feel weak and cause you to become dehydrated. You may not be able to keep fluids down. Dehydration can make you tired and thirsty, cause you to have a dry mouth, and decrease how often you urinate. Older adults and people with other diseases or a weak immune system are at higher risk for dehydration.  It is important to replace the fluids that you lose from diarrhea and vomiting. If you become severely dehydrated, you may need to get fluids through an IV tube.  What are the causes?  Gastroenteritis is caused by various viruses, including rotavirus and norovirus. Norovirus is the most common cause in adults.  You can get sick by eating food, drinking water, or touching a surface contaminated with one of these viruses. You can also get sick from sharing utensils or other personal items with an infected person.  What increases the risk?  This condition is more likely to develop in people:  · Who have a weak defense system (immune system).  · Who live with one or more children who are younger than 2 years old.  · Who live in a nursing home.  · Who go on cruise ships.  What are the signs or symptoms?  Symptoms of this condition start suddenly 1-2 days after exposure to a virus. Symptoms may last a few days or as long as a week. The most common symptoms are watery diarrhea and vomiting. Other symptoms include:  · Fever.  · Headache.  · Fatigue.  · Pain in the abdomen.  · Chills.  · Weakness.  · Nausea.  · Muscle aches.  · Loss of appetite.  How is this diagnosed?  This condition is diagnosed with a medical history and physical exam. You may  also have a stool test to check for viruses or other infections.  How is this treated?  This condition typically goes away on its own. The focus of treatment is to restore lost fluids (rehydration). Your health care provider may recommend that you take an oral rehydration solution (ORS) to replace important salts and minerals (electrolytes) in your body. Severe cases of this condition may require giving fluids through an IV tube.  Treatment may also include medicine to help with your symptoms.  Follow these instructions at home:  Follow instructions from your health care provider about how to care for yourself at home.  Eating and drinking  Follow these recommendations as told by your health care provider:  · Take an ORS. This is a drink that is sold at pharmacies and retail stores.  · Drink clear fluids in small amounts as you are able. Clear fluids include water, ice chips, diluted fruit juice, and low-calorie sports drinks.  · Eat bland, easy-to-digest foods in small amounts as you are able. These foods include bananas, applesauce, rice, lean meats, toast, and crackers.  · Avoid fluids that contain a lot of sugar or caffeine, such as energy drinks, sports drinks, and soda.  · Avoid alcohol.  · Avoid spicy or fatty foods.  General instructions  · Drink enough fluid to keep your urine clear or pale yellow.  · Wash your hands often. If soap and water are not available, use hand .  · Make sure that all people in your household wash their hands well and often.  · Take over-the-counter and prescription medicines only as told by your health care provider.  · Rest at home while you recover.  · Watch your condition for any changes.  · Take a warm bath to relieve any burning or pain from frequent diarrhea episodes.  · Keep all follow-up visits as told by your health care provider. This is important.  Contact a health care provider if:  · You cannot keep fluids down.  · Your symptoms get worse.  · You have new  symptoms.  · You feel light-headed or dizzy.  · You have muscle cramps.  Get help right away if:  · You have chest pain.  · You feel extremely weak or you faint.  · You see blood in your vomit.  · Your vomit looks like coffee grounds.  · You have bloody or black stools or stools that look like tar.  · You have a severe headache, a stiff neck, or both.  · You have a rash.  · You have severe pain, cramping, or bloating in your abdomen.  · You have trouble breathing or you are breathing very quickly.  · Your heart is beating very quickly.  · Your skin feels cold and clammy.  · You feel confused.  · You have pain when you urinate.  · You have signs of dehydration, such as:  ¨ Dark urine, very little urine, or no urine.  ¨ Cracked lips.  ¨ Dry mouth.  ¨ Sunken eyes.  ¨ Sleepiness.  ¨ Weakness.  This information is not intended to replace advice given to you by your health care provider. Make sure you discuss any questions you have with your health care provider.  Document Released: 12/18/2006 Document Revised: 05/31/2017 Document Reviewed: 08/23/2016  [employer] Patient Education © 2017 [employer]

## 2019-04-21 DIAGNOSIS — R82.998 LEUKOCYTES IN URINE: ICD-10-CM

## 2019-04-23 LAB
BACTERIA UR CULT: ABNORMAL
BACTERIA UR CULT: ABNORMAL
SIGNIFICANT IND 70042: ABNORMAL
SITE SITE: ABNORMAL
SOURCE SOURCE: ABNORMAL

## 2019-04-23 ASSESSMENT — ENCOUNTER SYMPTOMS
CONSTIPATION: 0
ARTHRALGIAS: 0
ANOREXIA: 0
FLATUS: 0
NAUSEA: 1
HEMATOCHEZIA: 0
MYALGIAS: 0
WEIGHT LOSS: 0
VOMITING: 1
ABDOMINAL PAIN: 1
FEVER: 0
DIARRHEA: 1
BELCHING: 1
HEADACHES: 0

## 2019-04-23 NOTE — PROGRESS NOTES
Subjective:      Adrianne Martinez is a 63 y.o. female who presents with Other (stomache pain , diarrhea and vomitting x 6 days )    Denies past medical, surgical or family history that is significant to today's problem.   RX or OTC medications-- reviewed with patient today. .  Allergies   Allergen Reactions   • Contrast Media With Iodine [Iodine]      Malaise, weakness, inability to get up off table, mental status changes   • Demerol Vomiting   • Meperidine Hives   • Oxycontin [Oxycodone Hcl] Vomiting     Able to take vicodin.   • Statins [Hmg-Coa-R Inhibitors] Diarrhea     Nausea and diarrhea   • Cherry Rash     rash   • Cherry Rash     Raw cherries    • Latex      rash   • Nsaids Rash     rash   • Tape      Rash, blister              Abdominal Pain   This is a new problem. The current episode started in the past 7 days. The onset quality is gradual. The problem occurs constantly. The problem has been gradually improving. The pain is located in the generalized abdominal region. The pain is at a severity of 5/10. The pain is moderate. The quality of the pain is dull, colicky, aching and cramping. The abdominal pain does not radiate. Associated symptoms include belching, diarrhea (twice yesterday. once today), nausea (none at all today) and vomiting (twice yesterday , None today). Pertinent negatives include no anorexia, arthralgias, constipation, dysuria, fever, flatus, headaches, hematochezia, hematuria, melena, myalgias or weight loss. Nothing aggravates the pain. The pain is relieved by nothing. She has tried nothing for the symptoms.       Review of Systems   Constitutional: Negative for fever and weight loss.   Gastrointestinal: Positive for abdominal pain, diarrhea (twice yesterday. once today), nausea (none at all today) and vomiting (twice yesterday , None today). Negative for anorexia, constipation, flatus, hematochezia and melena.   Genitourinary: Negative for dysuria and hematuria.   Musculoskeletal:  "Negative for arthralgias and myalgias.   Neurological: Negative for headaches.          Objective:     /78 (BP Location: Left arm, Patient Position: Sitting, BP Cuff Size: Adult)   Pulse 87   Temp 36.9 °C (98.4 °F) (Temporal)   Resp 16   Ht 1.549 m (5' 1\")   Wt 46.3 kg (102 lb)   SpO2 95%   BMI 19.27 kg/m²      Physical Exam   Constitutional: She is oriented to person, place, and time. She appears well-developed and well-nourished.   HENT:   Head: Normocephalic.   Eyes: Pupils are equal, round, and reactive to light.   Neck: Normal range of motion.   Cardiovascular: Normal rate, regular rhythm and normal heart sounds.    Pulmonary/Chest: Effort normal and breath sounds normal. No respiratory distress.   Musculoskeletal: Normal range of motion.   Neurological: She is alert and oriented to person, place, and time.   Skin: Skin is warm, dry and intact. Capillary refill takes less than 2 seconds.   Psychiatric: She has a normal mood and affect. Her behavior is normal. Judgment and thought content normal.   Nursing note and vitals reviewed.       UA: negative  Except trace leukocytes        Assessment/Plan:     1. Gastroenteritis      2. Leukocytes in urine    - URINE CULTURE(NEW); Future    3. Generalized abdominal pain      BRAT type diet - bland, no greasy or fatty foods. Avoid dairy until symptoms resolve.     Keep well hydrated    Declines nausea medication    OTC anti-motility medication if liquid stools > 5 / day. Dosage and directions per       Return to clinic or PCP  4-5 days if current symptoms are not resolving in a satisfactory manner or sooner if new or worsening symptoms occur.   Patient was advised of signs and symptoms which would warrant further evaluation and /or emergent evaluation in ER.  Verbalized agreement with this treatment plan and seemed to understand without barriers. Questions were encouraged and answered to patients satisfaction.     Aftercare instructions were " given to pt

## 2019-05-28 ENCOUNTER — APPOINTMENT (OUTPATIENT)
Dept: RADIOLOGY | Facility: MEDICAL CENTER | Age: 64
End: 2019-05-28
Attending: INTERNAL MEDICINE
Payer: COMMERCIAL

## 2019-06-11 ENCOUNTER — OFFICE VISIT (OUTPATIENT)
Dept: INTERNAL MEDICINE | Facility: IMAGING CENTER | Age: 64
End: 2019-06-11
Payer: COMMERCIAL

## 2019-06-11 ENCOUNTER — HOSPITAL ENCOUNTER (OUTPATIENT)
Facility: MEDICAL CENTER | Age: 64
End: 2019-06-11
Attending: FAMILY MEDICINE
Payer: COMMERCIAL

## 2019-06-11 VITALS
TEMPERATURE: 98.5 F | WEIGHT: 102 LBS | OXYGEN SATURATION: 95 % | HEART RATE: 89 BPM | SYSTOLIC BLOOD PRESSURE: 115 MMHG | BODY MASS INDEX: 19.26 KG/M2 | HEIGHT: 61 IN | DIASTOLIC BLOOD PRESSURE: 68 MMHG | RESPIRATION RATE: 16 BRPM

## 2019-06-11 DIAGNOSIS — Z11.59 ENCOUNTER FOR HEPATITIS C SCREENING TEST FOR LOW RISK PATIENT: ICD-10-CM

## 2019-06-11 DIAGNOSIS — R21 SKIN RASH: ICD-10-CM

## 2019-06-11 DIAGNOSIS — R73.9 HYPERGLYCEMIA: ICD-10-CM

## 2019-06-11 DIAGNOSIS — F51.01 PRIMARY INSOMNIA: Chronic | ICD-10-CM

## 2019-06-11 DIAGNOSIS — E03.8 OTHER SPECIFIED HYPOTHYROIDISM: ICD-10-CM

## 2019-06-11 LAB
EST. AVERAGE GLUCOSE BLD GHB EST-MCNC: 117 MG/DL
HBA1C MFR BLD: 5.7 % (ref 0–5.6)
HCV AB SER QL: NEGATIVE
T3FREE SERPL-MCNC: 4.34 PG/ML (ref 2.4–4.2)
T4 FREE SERPL-MCNC: 0.94 NG/DL (ref 0.53–1.43)
TSH SERPL DL<=0.005 MIU/L-ACNC: 0.01 UIU/ML (ref 0.38–5.33)

## 2019-06-11 PROCEDURE — 84443 ASSAY THYROID STIM HORMONE: CPT

## 2019-06-11 PROCEDURE — 86803 HEPATITIS C AB TEST: CPT

## 2019-06-11 PROCEDURE — 99214 OFFICE O/P EST MOD 30 MIN: CPT | Performed by: FAMILY MEDICINE

## 2019-06-11 PROCEDURE — 84439 ASSAY OF FREE THYROXINE: CPT

## 2019-06-11 PROCEDURE — 83036 HEMOGLOBIN GLYCOSYLATED A1C: CPT

## 2019-06-11 PROCEDURE — 84481 FREE ASSAY (FT-3): CPT

## 2019-06-11 RX ORDER — ZOLPIDEM TARTRATE 10 MG/1
10 TABLET ORAL NIGHTLY PRN
Qty: 30 TAB | Refills: 2 | Status: SHIPPED
Start: 2019-06-14 | End: 2019-07-14

## 2019-06-11 NOTE — PROGRESS NOTES
Chief Complaint   Patient presents with   • Rash     Started 5 days ago on abdomen       HPI:  Patient is a 63 y.o. female established patient who presents today for evaluation of new skin rash on abdomen area that appeared approximately five days ago. She was visiting family in [place] at the time but denies any new exposures that may have caused the rash. She reports that the areas are intensely itchy at times but denies associated blistering nor acute systemic illness. She has tried topical cortisone and benadryl creams with some improvement of itching. She prefers to avoid prescription medication use for this condition. She also has chronic primary insomnia well controlled with infrequent Ambien use. She continues to benefit from Ambien use, denies medication - related side effects, and uses it responsibly. She is traveling to [place] next year, and I recommended [employer] for her travel vaccination needs.     Patient Active Problem List    Diagnosis Date Noted   • Chronic right hip pain 05/21/2017     Priority: Low   • Body mass index (BMI) 19.9 or less, adult 06/29/2018   • Mixed dyslipidemia 02/12/2018   • Post menopausal syndrome 02/12/2018   • Vitamin D deficiency 02/12/2018   • History of breast cancer 05/08/2017   • Chronic narcotic dependence (HCC) 05/05/2017   • Diverticulosis of large intestine without hemorrhage, per 12/23/16 COLONOSCOPY 01/14/2017   • Adenomatous polyp of colon, per 12/23/16 COLONOSCOPY 01/14/2017   • Atherosclerosis of right carotid artery, mild 12/15/2016   • History of opthalmic migraine 07/12/2016   • Frozen shoulder 03/08/2016   • Irritable bowel syndrome (IBS) 01/19/2016   • Family history of colon cancer    • Chronic pain 08/13/2013   • Primary insomnia    • Chronic pelvic pain in female        Past medical, surgical, family, and social history was reviewed in Epic chart by me today.     Medications and allergies reviewed and updated in Epic chart by me today.  "    ROS:  Pertinent positives listed above in HPI. All other systems have been reviewed and are negative.    PE:   /68 (BP Location: Left arm, Patient Position: Sitting, BP Cuff Size: Adult)   Pulse 89   Temp 36.9 °C (98.5 °F) (Temporal)   Resp 16   Ht 1.549 m (5' 1\")   Wt 46.3 kg (102 lb)   SpO2 95%   BMI 19.27 kg/m²   Vital signs reviewed with patient.     Gen: Well developed; well nourished; no acute distress; age appropriate appearance   CV: Regular rate and rhythm; S1/ S2 present; no murmur, gallop or rub noted  Pulm: No respiratory distress; clear to ascultation b/l; no wheezing or stridor noted b/l  Abd: Adequate bowel sounds noted; soft and nontender; no rebound, rigidity, nor distention  Extremities: No peripheral edema b/l LE extremities/ no clubbing nor cyanosis noted  Skin: Warm and dry; no rashes noted; fine pink rash noted on abdomen (R=L) consistent with dermatitis. Area is dry and small scratch marks are present.   Neuro: No focal deficits noted   Psych: AAOx4; mood and affect are at baseline    A/P:  1. Skin rash  Recommend supportive care measures for mild dermatitis. She prefers not to take oral/RX medication for itch control so oatmeal baths/compresses and calamine lotion can be used as needed. Pt is to monitor condition accordingly.     2. Encounter for hepatitis C screening test for low risk patient  - HEP C VIRUS ANTIBODY; Future    3. Primary insomnia  Stable/ pt is to continue PRN Ambien use for sleep control.  reviewed today and no concerns noted. Pt is well versed in safe use of controlled medication, and benefit of use outweighs risk at this time. New RX faxed to pharmacy.   - zolpidem (AMBIEN) 10 MG Tab; Take 1 Tab by mouth at bedtime as needed for Sleep for up to 30 days.  Dispense: 30 Tab; Refill: 2     Pt will have labs pending from [facility] drawn today for further health care planning, and she is to contact our office if rash does not resolve with supportive care " measures.

## 2019-07-16 ENCOUNTER — HOSPITAL ENCOUNTER (OUTPATIENT)
Dept: RADIOLOGY | Facility: MEDICAL CENTER | Age: 64
End: 2019-07-16
Attending: INTERNAL MEDICINE
Payer: COMMERCIAL

## 2019-07-16 DIAGNOSIS — R92.30 DENSE BREAST TISSUE: ICD-10-CM

## 2019-07-16 PROCEDURE — 76641 ULTRASOUND BREAST COMPLETE: CPT

## 2019-09-24 ENCOUNTER — NON-PROVIDER VISIT (OUTPATIENT)
Dept: INTERNAL MEDICINE | Facility: IMAGING CENTER | Age: 64
End: 2019-09-24
Payer: COMMERCIAL

## 2019-09-24 DIAGNOSIS — Z23 NEED FOR VACCINATION: ICD-10-CM

## 2019-09-24 PROCEDURE — 90686 IIV4 VACC NO PRSV 0.5 ML IM: CPT | Performed by: FAMILY MEDICINE

## 2019-09-24 PROCEDURE — 90471 IMMUNIZATION ADMIN: CPT | Performed by: FAMILY MEDICINE

## 2019-09-24 PROCEDURE — 90472 IMMUNIZATION ADMIN EACH ADD: CPT | Performed by: FAMILY MEDICINE

## 2019-09-24 PROCEDURE — 90715 TDAP VACCINE 7 YRS/> IM: CPT | Performed by: FAMILY MEDICINE

## 2019-12-30 ENCOUNTER — TELEPHONE (OUTPATIENT)
Dept: HEMATOLOGY ONCOLOGY | Facility: MEDICAL CENTER | Age: 64
End: 2019-12-30

## 2019-12-30 NOTE — TELEPHONE ENCOUNTER
1st attempt to contact the patient- Left voicemail for patient requesting a return call to schedule New Oncology Genetic appointment. (Remind patient to bring a list/ or questionnaire, of her cancer related family history)  NP/MAGDALENA/ breast cancer/ Dr Dutta

## 2020-01-05 NOTE — PROGRESS NOTES
"Non face to face prolonged services of clinical data and chart information reviewed for a total time of 30 performed on 1/4 for Adrianen Martinez     Reviewed were:                          Referral                          Previous encounters                                                    Imaging all breast imaging including mammography, CT/tomography and staging MRI/PET                          Previous procedures biopsy and surgical procedures including pathology studies and interpretation                                                    Notes   C50.312 (ICD-10-CM) - Malignant neoplasm of lower-inner quadrant of left female breast   D24.2 (ICD-10-CM) - Benign neoplasm of left breast                               Media family history and germline molecular studies on family members                          Miscellaneous reports                          Patient summaries family history as documented by referring clinician           Counseling, testing information and materials prepared     \"I spent 30 minutes  from 1300 to 1330 reviewing past records, prior to visit pertaining to genetic risk.\"      Lele Matthews M.D.  edited  "

## 2020-01-06 ENCOUNTER — OFFICE VISIT (OUTPATIENT)
Dept: HEMATOLOGY ONCOLOGY | Facility: MEDICAL CENTER | Age: 65
End: 2020-01-06
Payer: COMMERCIAL

## 2020-01-06 VITALS
RESPIRATION RATE: 18 BRPM | TEMPERATURE: 96.8 F | WEIGHT: 101.85 LBS | BODY MASS INDEX: 18.74 KG/M2 | OXYGEN SATURATION: 96 % | HEIGHT: 62 IN | HEART RATE: 94 BPM | SYSTOLIC BLOOD PRESSURE: 110 MMHG | DIASTOLIC BLOOD PRESSURE: 68 MMHG

## 2020-01-06 DIAGNOSIS — Z80.3 FAMILY HISTORY OF BREAST CANCER: ICD-10-CM

## 2020-01-06 DIAGNOSIS — Z85.3 HISTORY OF BREAST CANCER: ICD-10-CM

## 2020-01-06 DIAGNOSIS — Z15.01 BREAST CANCER GENETIC SUSCEPTIBILITY: ICD-10-CM

## 2020-01-06 DIAGNOSIS — C50.312 MALIGNANT NEOPLASM OF LOWER-INNER QUADRANT OF LEFT FEMALE BREAST, UNSPECIFIED ESTROGEN RECEPTOR STATUS (HCC): ICD-10-CM

## 2020-01-06 DIAGNOSIS — Z80.0 FAMILY HISTORY OF COLON CANCER: ICD-10-CM

## 2020-01-06 DIAGNOSIS — Z80.0 FAMILY HISTORY OF PANCREATIC CANCER: ICD-10-CM

## 2020-01-06 PROCEDURE — 36415 COLL VENOUS BLD VENIPUNCTURE: CPT | Performed by: MEDICAL GENETICS

## 2020-01-06 PROCEDURE — 99203 OFFICE O/P NEW LOW 30 MIN: CPT | Mod: 25 | Performed by: MEDICAL GENETICS

## 2020-01-06 NOTE — PROGRESS NOTES
GENETIC RISK ASSESSMENT    Adrianne Martinez is a 64 y.o. year old patient who was referred by [facility] for cancer genetic risk assessment.    Chief Complaint: breast cancer and family history of breast and pancreatic cancer    HPI: The patient was well until age61 at which time a suspicious physical exam caused the patient to be referred for imaging ). A suspicious (mammogram study identified a (left) breast mass. A biopsy  was performed and a specimen was submitted to pathology.     A diagnosis of  carcinoma was made.   The patientsmother (breast), cousin (pancreas), father (colon, skin)  Review of personal and family histories suggested that a cancer genetic risk assessment was in order.    Review of Systems (ROS):  Constitutional N  Eyes N  ENT N   Respiratory N  Cardiovascular N  Gastrointestinal N  Genitourinary N  Musculoskeletal N  Skin N  Neurological N  Endocrine N  Psychiatric N  Hematologic/lymphatic N  Allergic/Immunologic demoerol  All other systems reviewed and are negative.    History (Past/Family)  Family History:   Family History   Problem Relation Age of Onset   • Cancer Mother         breast cancer   • Cancer Father         colon, throat, and skin    • Heart Attack Brother 50      3 generation pedigree built   Yes   [place] empiric risk calculation No   [place] empiric risk calculation  No    Social History:       Social History     Socioeconomic History   • Marital status:      Spouse name: Not on file   • Number of children: 2   • Years of education: Not on file   • Highest education level: Not on file   Occupational History   • Not on file   Social Needs   • Financial resource strain: Not on file   • Food insecurity:     Worry: Not on file     Inability: Not on file   • Transportation needs:     Medical: Not on file     Non-medical: Not on file   Tobacco Use   • Smoking status: Never Smoker   • Smokeless tobacco: Never Used   Substance and Sexual Activity   • Alcohol use: Yes      "Alcohol/week: 0.6 oz     Types: 1 Glasses of wine per week     Comment: 1-3 per week    • Drug use: No   • Sexual activity: Yes     Partners: Male   Lifestyle   • Physical activity:     Days per week: Not on file     Minutes per session: Not on file   • Stress: Not on file   Relationships   • Social connections:     Talks on phone: Not on file     Gets together: Not on file     Attends Religion service: Not on file     Active member of club or organization: Not on file     Attends meetings of clubs or organizations: Not on file     Relationship status: Not on file   • Intimate partner violence:     Fear of current or ex partner: Not on file     Emotionally abused: Not on file     Physically abused: Not on file     Forced sexual activity: Not on file   Other Topics Concern   • Not on file   Social History Narrative    ** Merged History Encounter **         Was previously an  - didn't like her job - didn't like conflict.         Patient Past History:  Past Medical History:   Diagnosis Date   • Anesthesia     \"heart stopped during colonoscopy\"    • Bowel habit changes     constipation    • Breast cancer (HCC)     left   • Cancer (HCC) 2017    left breast   • Cancer (HCC)     Breast    • Cataract     bilat IOL   • Chronic pain    • Chronic pelvic pain in female    • Colon polyp    • ENDOMETRIOSIS    • Endometriosis    • Family history of colon cancer    • Gynecological disorder    • Heart burn    • Hepatitis     as a child, does not know what kind \"was very sick, then I recovered\"    • Hyperlipidemia    • Indigestion    • Insomnia    • Jaundice 1967     r/t hepatitis    • Pain     right hip, neck, back    • Pain    • Pneumonia 2006   • Postmenopausal HRT (hormone replacement therapy)    • Psychiatric problem     depression    • Recurrent UTI    • Seizure (HCC)     last seizure 8/2016 (only had 2 seizures did not follow up with neuro, no meds)    • Snoring    • Urine frequency            NCCN Testing " Criteria Met                            Yes    Physical Exam  Constitutional    There were no vitals taken for this visit.        General appearance: normal   Head Circumference:   ([place])  Eyes    Conjunctiva: clear   Pupils: reactive     ENMT    External inspection: normal   Assessment of Hearing: normal   Lips/cheeks/gums: no lesions  Neck   External exam: normal   Thyroid: no masses  Respiratory   Auscultation: clear    Cardiovascular   Auscultation: RRR   Edema : none  Chest   Breasts (exam): not done   Palpation:   GI   External exam and palpation: normal     Pelvic (female): not done   External exam (male):   Lymphatic   Palpation in 2 areas: normal    Musculoskeletal   Gait: normal   Digits and Nails: no lesions  Skin   Inspection: normla   Palpation: no masses                  Fibrofolliculoma: absent                  Trichodiscoma: absent                   Akrochordon: absent                   CAfe-au-lait:  absent                  Hypopigmentation: absent                  Mucosal freckling:  absent  Neurologic   Cranial nerves: intact   DTR’s: 2+       Assessment and Plan:  Patient with diagnosis of breast cancer and family history of breast, pancreas and colon cancers    I spent a total of 40 minutes of face to face time with >50% of time spent in counseling and coordination of care discussing matters stated in above note.    Reid panel ordered      Lele Matthews M.D.

## 2020-01-09 NOTE — NON-PROVIDER
Adrianne Martinez is a 64 y.o. female here for: Lab Draws     Procedure Performed: Venipuncture     Anatomical site: Right Antecubital Area (AC)    Equipment used: 23g butterfly    Labs drawn: [employer] (two lavender EDTA tubes)    Ordering Provider: {Dr. Lele Osorio By: Jose Rafael Munroe, [facility]

## 2020-03-25 ENCOUNTER — TELEMEDICINE2 (OUTPATIENT)
Dept: INTERNAL MEDICINE | Facility: IMAGING CENTER | Age: 65
End: 2020-03-25
Payer: COMMERCIAL

## 2020-03-25 DIAGNOSIS — F51.01 PRIMARY INSOMNIA: ICD-10-CM

## 2020-03-25 PROCEDURE — 99441 PR PHYSICIAN TELEPHONE EVALUATION 5-10 MIN: CPT | Mod: CR | Performed by: FAMILY MEDICINE

## 2020-03-25 RX ORDER — ZOLPIDEM TARTRATE 10 MG/1
10 TABLET ORAL NIGHTLY PRN
Qty: 30 TAB | Refills: 2 | Status: SHIPPED
Start: 2020-03-25 | End: 2020-04-24

## 2020-03-25 RX ORDER — BACLOFEN 10 MG/1
TABLET ORAL
COMMUNITY
Start: 2020-03-24 | End: 2021-06-25

## 2020-03-25 NOTE — PROGRESS NOTES
Telephone Appointment Visit   As a means of avoiding spread of COVID-19, this visit is being conducted by telephone. This telephone visit was initiated by the patient and they verbally consented.    Time at start of call: 10 AM    Reason for Call:   Chief Complaint   Patient presents with   • Insomnia     Ambien   • Telephone Visit       HPI:  Patient is a 64 y.o. female established patient who had a telephone visit to obtain new Ambien prescription. She uses Ambien infrequently to control chronic primary insomnia, denies medication related side effects, and use this medication in a safe manner. She does not mix this medication with other controlled medications and continues to see Dr. Patricio for chronic pain management. She denies other new health concerns and is sheltering in place at home due to COVID-19 crisis.     Patient Active Problem List    Diagnosis Date Noted   • Chronic right hip pain 05/21/2017     Priority: Low   • Body mass index (BMI) 19.9 or less, adult 06/29/2018   • Mixed dyslipidemia 02/12/2018   • Post menopausal syndrome 02/12/2018   • Vitamin D deficiency 02/12/2018   • History of breast cancer 05/08/2017   • Chronic narcotic dependence (HCC) 05/05/2017   • Diverticulosis of large intestine without hemorrhage, per 12/23/16 COLONOSCOPY 01/14/2017   • Adenomatous polyp of colon, per 12/23/16 COLONOSCOPY 01/14/2017   • Atherosclerosis of right carotid artery, mild 12/15/2016   • History of opthalmic migraine 07/12/2016   • Frozen shoulder 03/08/2016   • Irritable bowel syndrome (IBS) 01/19/2016   • Family history of colon cancer    • Chronic pain 08/13/2013   • Primary insomnia    • Chronic pelvic pain in female        Past medical, surgical, family, and social history was reviewed and updated in Epic chart by me today.     Medications and allergies reviewed and updated in Epic chart by me today.     ROS:  Pertinent positives listed above in HPI. All other systems have been reviewed and are  negative.    A/P:  1. Primary insomnia  Stable/ patient can continue infrequent Ambien use for insomnia control.  reviewed today and no concerns noted. Pt is well versed in safe use of controlled medication, and benefit of use outweighs risk at this time. New RX faxed to pharmacy.   - zolpidem (AMBIEN) 10 MG Tab; Take 1 Tab by mouth at bedtime as needed for Sleep for up to 30 days.  Dispense: 30 Tab; Refill: 2     Follow-up: PRN - patient is aware of upcoming screening mammogram and fasting labs - will obtain when safe to do so.    Time at end of call: 10:06 AM  Total Time Spent: 6 minutes    Danielle Blas M.D.

## 2020-05-19 ENCOUNTER — TELEPHONE (OUTPATIENT)
Dept: INTERNAL MEDICINE | Facility: IMAGING CENTER | Age: 65
End: 2020-05-19

## 2020-06-23 ENCOUNTER — NON-PROVIDER VISIT (OUTPATIENT)
Dept: INTERNAL MEDICINE | Facility: IMAGING CENTER | Age: 65
End: 2020-06-23
Payer: COMMERCIAL

## 2020-06-23 ENCOUNTER — HOSPITAL ENCOUNTER (OUTPATIENT)
Dept: LAB | Facility: MEDICAL CENTER | Age: 65
End: 2020-06-23
Attending: INTERNAL MEDICINE
Payer: COMMERCIAL

## 2020-06-23 PROCEDURE — 82306 VITAMIN D 25 HYDROXY: CPT

## 2020-06-23 PROCEDURE — 80048 BASIC METABOLIC PNL TOTAL CA: CPT

## 2020-06-23 PROCEDURE — 84100 ASSAY OF PHOSPHORUS: CPT

## 2020-06-23 PROCEDURE — 83735 ASSAY OF MAGNESIUM: CPT

## 2020-06-24 LAB
25(OH)D3 SERPL-MCNC: 85 NG/ML (ref 30–100)
ANION GAP SERPL CALC-SCNC: 14 MMOL/L (ref 7–16)
BUN SERPL-MCNC: 15 MG/DL (ref 8–22)
CALCIUM SERPL-MCNC: 9.3 MG/DL (ref 8.5–10.5)
CHLORIDE SERPL-SCNC: 99 MMOL/L (ref 96–112)
CO2 SERPL-SCNC: 24 MMOL/L (ref 20–33)
CREAT SERPL-MCNC: 0.65 MG/DL (ref 0.5–1.4)
GLUCOSE SERPL-MCNC: 115 MG/DL (ref 65–99)
MAGNESIUM SERPL-MCNC: 2.4 MG/DL (ref 1.5–2.5)
PHOSPHATE SERPL-MCNC: 4.1 MG/DL (ref 2.5–4.5)
POTASSIUM SERPL-SCNC: 4.3 MMOL/L (ref 3.6–5.5)
SODIUM SERPL-SCNC: 137 MMOL/L (ref 135–145)

## 2020-07-17 ENCOUNTER — HOSPITAL ENCOUNTER (OUTPATIENT)
Dept: RADIOLOGY | Facility: MEDICAL CENTER | Age: 65
End: 2020-07-17
Attending: INTERNAL MEDICINE
Payer: COMMERCIAL

## 2020-07-17 DIAGNOSIS — Z12.31 VISIT FOR SCREENING MAMMOGRAM: ICD-10-CM

## 2020-07-17 PROCEDURE — 76641 ULTRASOUND BREAST COMPLETE: CPT

## 2020-07-17 PROCEDURE — 77067 SCR MAMMO BI INCL CAD: CPT

## 2020-07-23 ENCOUNTER — HOSPITAL ENCOUNTER (OUTPATIENT)
Dept: RADIOLOGY | Facility: MEDICAL CENTER | Age: 65
End: 2020-07-23
Attending: INTERNAL MEDICINE
Payer: COMMERCIAL

## 2020-07-23 DIAGNOSIS — R92.8 ABNORMAL MAMMOGRAM: ICD-10-CM

## 2020-07-23 PROCEDURE — 76642 ULTRASOUND BREAST LIMITED: CPT | Mod: RT

## 2020-11-20 ENCOUNTER — HOSPITAL ENCOUNTER (OUTPATIENT)
Dept: LAB | Facility: MEDICAL CENTER | Age: 65
End: 2020-11-20
Attending: FAMILY MEDICINE
Payer: COMMERCIAL

## 2020-11-20 DIAGNOSIS — Z20.822 ENCOUNTER FOR LABORATORY TESTING FOR COVID-19 VIRUS: ICD-10-CM

## 2020-11-20 PROCEDURE — U0003 INFECTIOUS AGENT DETECTION BY NUCLEIC ACID (DNA OR RNA); SEVERE ACUTE RESPIRATORY SYNDROME CORONAVIRUS 2 (SARS-COV-2) (CORONAVIRUS DISEASE [COVID-19]), AMPLIFIED PROBE TECHNIQUE, MAKING USE OF HIGH THROUGHPUT TECHNOLOGIES AS DESCRIBED BY CMS-2020-01-R: HCPCS

## 2020-11-20 PROCEDURE — C9803 HOPD COVID-19 SPEC COLLECT: HCPCS

## 2020-11-21 LAB
COVID ORDER STATUS COVID19: NORMAL
SARS-COV-2 RNA RESP QL NAA+PROBE: NOTDETECTED
SPECIMEN SOURCE: NORMAL

## 2020-12-08 ENCOUNTER — TELEMEDICINE (OUTPATIENT)
Dept: INTERNAL MEDICINE | Facility: IMAGING CENTER | Age: 65
End: 2020-12-08
Payer: COMMERCIAL

## 2020-12-08 DIAGNOSIS — Z86.16 HISTORY OF 2019 NOVEL CORONAVIRUS DISEASE (COVID-19): ICD-10-CM

## 2020-12-08 DIAGNOSIS — F51.01 PRIMARY INSOMNIA: ICD-10-CM

## 2020-12-08 PROCEDURE — 99214 OFFICE O/P EST MOD 30 MIN: CPT | Mod: 95,CR | Performed by: FAMILY MEDICINE

## 2020-12-08 RX ORDER — ZOLPIDEM TARTRATE 10 MG/1
10 TABLET ORAL NIGHTLY PRN
Qty: 30 TAB | Refills: 2 | Status: SHIPPED | OUTPATIENT
Start: 2020-12-08 | End: 2021-01-07

## 2020-12-08 ASSESSMENT — PATIENT HEALTH QUESTIONNAIRE - PHQ9: CLINICAL INTERPRETATION OF PHQ2 SCORE: 0

## 2020-12-09 NOTE — PROGRESS NOTES
Virtual Visit: Established Patient   This visit was conducted via Zoom using secure and encrypted videoconferencing technology. The patient was in a private location in the state of [place].    The patient's identity was confirmed and verbal consent was obtained for this virtual visit.    Subjective:     Chief Complaint   Patient presents with   • Insomnia     Ambien   • Patient Question     suffering from hair loss and weight loss during and after COVID infection       HPI:  Patient is a 65 y.o. female established patient who presents today to obtain new Ambien prescription for primary insomnia control. She has used this medication in the past with good success, denies medication related side effects, and continues to use this controlled medication safely. She reports probable COVID-19 infection, despite documented test due to constant exposure from her COVID positive . She reports resolving symptoms but has had 8 lb weight loss and ongoing hair loss (started prior to presumed COVID infection). She denies other related symptoms and has annual surveillance appointment with Dr. Dutta in January. She is due for fasting labs and is otherwise UTD with HCM items.     Patient Active Problem List    Diagnosis Date Noted   • Chronic right hip pain 05/21/2017     Priority: Low   • Body mass index (BMI) 19.9 or less, adult 06/29/2018   • Mixed dyslipidemia 02/12/2018   • Post menopausal syndrome 02/12/2018   • Vitamin D deficiency 02/12/2018   • History of breast cancer 05/08/2017   • Chronic narcotic dependence (HCC) 05/05/2017   • Diverticulosis of large intestine without hemorrhage, per 12/23/16 COLONOSCOPY 01/14/2017   • Adenomatous polyp of colon, per 12/23/16 COLONOSCOPY 01/14/2017   • Atherosclerosis of right carotid artery, mild 12/15/2016   • History of opthalmic migraine 07/12/2016   • Frozen shoulder 03/08/2016   • Irritable bowel syndrome (IBS) 01/19/2016   • Family history of colon cancer    • Chronic  pain 08/13/2013   • Primary insomnia    • Chronic pelvic pain in female        Past medical, surgical, family, and social history was reviewed and updated in Epic chart by me today.     Medications and allergies reviewed and updated in Epic chart by me today.     ROS:  Pertinent positives listed above in HPI. All other systems have been reviewed and are negative.     Objective:   Patient unable to provide me with vitals signs at visit today.     Physical Exam:  Constitutional: Alert, no distress, well-groomed.  Skin: No rashes in visible areas.  Eye: Round. Conjunctiva clear, lids normal. No icterus.   ENMT: Lips pink without lesions, good dentition, moist mucous membranes. Phonation normal.  Neck: Moves freely without pain.  Respiratory: Unlabored respiratory effort, no cough or audible wheeze  Psych: Alert and oriented x3, normal affect and mood.     Assessment and Plan:   The following treatment plan was discussed:     A/P:  1. Primary insomnia  Stable/ patient can continue PRN Ambien use for insomnia control.  reviewed today and no concerns noted. Pt is well versed in safe use of controlled medication, and benefit of use outweighs risk at this time.   New RX sent to pharmacy.   - zolpidem (AMBIEN) 10 MG Tab; Take 1 Tab by mouth at bedtime as needed for Sleep for up to 30 days.  Dispense: 30 Tab; Refill: 2    2. History of 2019 novel coronavirus disease (COVID-19)  Although patient has negative COVID test on file, she was ill with typical symptoms thru exposure to COVID positive . She reports 8 lb weight loss and ongoing hair loss (started pre-COVID), and we discussed first step will be obtaining fasting annual labs. Patient is in agreement, will make lab appointment next week at office, and will continue to monitor symptom resolution.       Follow-up: PRN

## 2020-12-21 ENCOUNTER — HOSPITAL ENCOUNTER (OUTPATIENT)
Facility: MEDICAL CENTER | Age: 65
End: 2020-12-21
Attending: INTERNAL MEDICINE
Payer: COMMERCIAL

## 2020-12-21 ENCOUNTER — HOSPITAL ENCOUNTER (OUTPATIENT)
Facility: MEDICAL CENTER | Age: 65
End: 2020-12-21
Attending: FAMILY MEDICINE
Payer: COMMERCIAL

## 2020-12-21 ENCOUNTER — NON-PROVIDER VISIT (OUTPATIENT)
Dept: INTERNAL MEDICINE | Facility: IMAGING CENTER | Age: 65
End: 2020-12-21
Payer: COMMERCIAL

## 2020-12-21 DIAGNOSIS — Z00.00 HEALTH CARE MAINTENANCE: ICD-10-CM

## 2020-12-21 DIAGNOSIS — E78.2 MIXED DYSLIPIDEMIA: Chronic | ICD-10-CM

## 2020-12-21 DIAGNOSIS — R73.9 HYPERGLYCEMIA: ICD-10-CM

## 2020-12-21 PROCEDURE — 80048 BASIC METABOLIC PNL TOTAL CA: CPT

## 2020-12-21 PROCEDURE — 83735 ASSAY OF MAGNESIUM: CPT

## 2020-12-21 PROCEDURE — 80061 LIPID PANEL: CPT

## 2020-12-21 PROCEDURE — 85025 COMPLETE CBC W/AUTO DIFF WBC: CPT

## 2020-12-21 PROCEDURE — 80053 COMPREHEN METABOLIC PANEL: CPT

## 2020-12-21 PROCEDURE — 84100 ASSAY OF PHOSPHORUS: CPT

## 2020-12-21 PROCEDURE — 82306 VITAMIN D 25 HYDROXY: CPT

## 2020-12-21 PROCEDURE — 84443 ASSAY THYROID STIM HORMONE: CPT

## 2020-12-21 PROCEDURE — 83036 HEMOGLOBIN GLYCOSYLATED A1C: CPT

## 2020-12-22 LAB
25(OH)D3 SERPL-MCNC: 93 NG/ML (ref 30–100)
ALBUMIN SERPL BCP-MCNC: 4.5 G/DL (ref 3.2–4.9)
ALBUMIN/GLOB SERPL: 1.7 G/DL
ALP SERPL-CCNC: 61 U/L (ref 30–99)
ALT SERPL-CCNC: 19 U/L (ref 2–50)
ANION GAP SERPL CALC-SCNC: 9 MMOL/L (ref 7–16)
ANION GAP SERPL CALC-SCNC: 9 MMOL/L (ref 7–16)
AST SERPL-CCNC: 22 U/L (ref 12–45)
BASOPHILS # BLD AUTO: 1.2 % (ref 0–1.8)
BASOPHILS # BLD: 0.07 K/UL (ref 0–0.12)
BILIRUB SERPL-MCNC: 0.4 MG/DL (ref 0.1–1.5)
BUN SERPL-MCNC: 10 MG/DL (ref 8–22)
BUN SERPL-MCNC: 11 MG/DL (ref 8–22)
CALCIUM SERPL-MCNC: 9.4 MG/DL (ref 8.5–10.5)
CALCIUM SERPL-MCNC: 9.5 MG/DL (ref 8.5–10.5)
CHLORIDE SERPL-SCNC: 103 MMOL/L (ref 96–112)
CHLORIDE SERPL-SCNC: 103 MMOL/L (ref 96–112)
CHOLEST SERPL-MCNC: 298 MG/DL (ref 100–199)
CO2 SERPL-SCNC: 25 MMOL/L (ref 20–33)
CO2 SERPL-SCNC: 26 MMOL/L (ref 20–33)
CREAT SERPL-MCNC: 0.53 MG/DL (ref 0.5–1.4)
CREAT SERPL-MCNC: 0.53 MG/DL (ref 0.5–1.4)
EOSINOPHIL # BLD AUTO: 0.2 K/UL (ref 0–0.51)
EOSINOPHIL NFR BLD: 3.6 % (ref 0–6.9)
ERYTHROCYTE [DISTWIDTH] IN BLOOD BY AUTOMATED COUNT: 44 FL (ref 35.9–50)
EST. AVERAGE GLUCOSE BLD GHB EST-MCNC: 114 MG/DL
GLOBULIN SER CALC-MCNC: 2.7 G/DL (ref 1.9–3.5)
GLUCOSE SERPL-MCNC: 80 MG/DL (ref 65–99)
GLUCOSE SERPL-MCNC: 83 MG/DL (ref 65–99)
HBA1C MFR BLD: 5.6 % (ref 0–5.6)
HCT VFR BLD AUTO: 42.3 % (ref 37–47)
HDLC SERPL-MCNC: 66 MG/DL
HGB BLD-MCNC: 13.4 G/DL (ref 12–16)
IMM GRANULOCYTES # BLD AUTO: 0.02 K/UL (ref 0–0.11)
IMM GRANULOCYTES NFR BLD AUTO: 0.4 % (ref 0–0.9)
LDLC SERPL CALC-MCNC: 185 MG/DL
LYMPHOCYTES # BLD AUTO: 1.42 K/UL (ref 1–4.8)
LYMPHOCYTES NFR BLD: 25.3 % (ref 22–41)
MAGNESIUM SERPL-MCNC: 2.6 MG/DL (ref 1.5–2.5)
MCH RBC QN AUTO: 30.6 PG (ref 27–33)
MCHC RBC AUTO-ENTMCNC: 31.7 G/DL (ref 33.6–35)
MCV RBC AUTO: 96.6 FL (ref 81.4–97.8)
MONOCYTES # BLD AUTO: 0.33 K/UL (ref 0–0.85)
MONOCYTES NFR BLD AUTO: 5.9 % (ref 0–13.4)
NEUTROPHILS # BLD AUTO: 3.57 K/UL (ref 2–7.15)
NEUTROPHILS NFR BLD: 63.6 % (ref 44–72)
NRBC # BLD AUTO: 0 K/UL
NRBC BLD-RTO: 0 /100 WBC
PHOSPHATE SERPL-MCNC: 3.4 MG/DL (ref 2.5–4.5)
PLATELET # BLD AUTO: 301 K/UL (ref 164–446)
PMV BLD AUTO: 10 FL (ref 9–12.9)
POTASSIUM SERPL-SCNC: 4.7 MMOL/L (ref 3.6–5.5)
POTASSIUM SERPL-SCNC: 4.8 MMOL/L (ref 3.6–5.5)
PROT SERPL-MCNC: 7.2 G/DL (ref 6–8.2)
RBC # BLD AUTO: 4.38 M/UL (ref 4.2–5.4)
SODIUM SERPL-SCNC: 137 MMOL/L (ref 135–145)
SODIUM SERPL-SCNC: 138 MMOL/L (ref 135–145)
TRIGL SERPL-MCNC: 233 MG/DL (ref 0–149)
TSH SERPL DL<=0.005 MIU/L-ACNC: 0.86 UIU/ML (ref 0.38–5.33)
WBC # BLD AUTO: 5.6 K/UL (ref 4.8–10.8)

## 2021-01-15 ENCOUNTER — TELEMEDICINE (OUTPATIENT)
Dept: INTERNAL MEDICINE | Facility: IMAGING CENTER | Age: 66
End: 2021-01-15
Payer: COMMERCIAL

## 2021-01-15 DIAGNOSIS — F41.0 PANIC ATTACK: ICD-10-CM

## 2021-01-15 DIAGNOSIS — E78.2 MIXED DYSLIPIDEMIA: ICD-10-CM

## 2021-01-15 DIAGNOSIS — Z91.89 OTHER SPECIFIED PERSONAL RISK FACTORS, NOT ELSEWHERE CLASSIFIED: ICD-10-CM

## 2021-01-15 PROCEDURE — 99214 OFFICE O/P EST MOD 30 MIN: CPT | Mod: 95,CR | Performed by: FAMILY MEDICINE

## 2021-01-17 ASSESSMENT — PATIENT HEALTH QUESTIONNAIRE - PHQ9: CLINICAL INTERPRETATION OF PHQ2 SCORE: 0

## 2021-01-18 NOTE — PROGRESS NOTES
Virtual Visit: Established Patient   This visit was conducted via Zoom using secure and encrypted videoconferencing technology. The patient was in a private location in the state of [place].    The patient's identity was confirmed and verbal consent was obtained for this virtual visit.    Subjective:     Chief Complaint   Patient presents with   • Follow-Up     review lab results   • Panic Attack       HPI:  Patient is a 65 y.o. female established patient who presents today to review labs done 12/1/2020 and to discuss recent panic attack. Patient reports having episode of elevated heart rate, sense of not being able to take a deep breath, and sweating right before going to bed recently. Patient denies associated N/V/D/F/bowel or bladder changes, and episode resolved without intervention. She denies other health concerns and is in good spirits today.    Patient Active Problem List    Diagnosis Date Noted   • Chronic right hip pain 05/21/2017     Priority: Low   • Body mass index (BMI) 19.9 or less, adult 06/29/2018   • Mixed dyslipidemia 02/12/2018   • Post menopausal syndrome 02/12/2018   • Vitamin D deficiency 02/12/2018   • History of breast cancer 05/08/2017   • Chronic narcotic dependence (HCC) 05/05/2017   • Diverticulosis of large intestine without hemorrhage, per 12/23/16 COLONOSCOPY 01/14/2017   • Adenomatous polyp of colon, per 12/23/16 COLONOSCOPY 01/14/2017   • Atherosclerosis of right carotid artery, mild 12/15/2016   • History of opthalmic migraine 07/12/2016   • Frozen shoulder 03/08/2016   • Irritable bowel syndrome (IBS) 01/19/2016   • Family history of colon cancer    • Chronic pain 08/13/2013   • Primary insomnia    • Chronic pelvic pain in female        Past medical, surgical, family, and social history was reviewed and updated in Epic chart by me today.     Medications and allergies reviewed and updated in Epic chart by me today.     Lab results 12/1/2020 reviewed with patient at visit today      ROS:  Pertinent positives listed above in HPI. All other systems have been reviewed and are negative.     Objective:   Patient unable to provide vital signs at visit today.     Physical Exam:  Constitutional: Alert, no distress, well-groomed.  Skin: No rashes in visible areas.  Eye: Round. Conjunctiva clear, lids normal. No icterus.   ENMT: Lips pink without lesions, good dentition, moist mucous membranes. Phonation normal.  Neck: Moves freely without pain.  Respiratory: Unlabored respiratory effort, no cough or audible wheeze  Psych: Alert and oriented x3, normal affect and mood.   Assessment and Plan:   The following treatment plan was discussed:       1. Other specified personal risk factors, not elsewhere classified/  Mixed dyslipidemia  Uncontrolled and worsening as compared to prior lab results. We discussed this condition at length, and she has never been keen on taking statin medication. Recommend patient obtain CT cardiac imaging as next step - will fax order to [facility] and contact patient when results are available.   - CT-CARDIAC SCORING; Future    2. Panic attack  Refer to HPI - recommend patient monitor for additional events and let me know if another laura occurs.      Follow-up: PRN

## 2021-01-28 ENCOUNTER — HOSPITAL ENCOUNTER (OUTPATIENT)
Dept: RADIOLOGY | Facility: MEDICAL CENTER | Age: 66
End: 2021-01-28
Attending: INTERNAL MEDICINE
Payer: COMMERCIAL

## 2021-01-28 DIAGNOSIS — C50.312 MALIGNANT NEOPLASM OF LOWER-INNER QUADRANT OF LEFT FEMALE BREAST, UNSPECIFIED ESTROGEN RECEPTOR STATUS (HCC): ICD-10-CM

## 2021-01-28 DIAGNOSIS — D24.2 BENIGN NEOPLASM OF LEFT BREAST: ICD-10-CM

## 2021-01-28 PROCEDURE — 76642 ULTRASOUND BREAST LIMITED: CPT | Mod: RT

## 2021-03-29 ENCOUNTER — APPOINTMENT (OUTPATIENT)
Dept: RADIOLOGY | Facility: MEDICAL CENTER | Age: 66
End: 2021-03-29
Attending: INTERNAL MEDICINE
Payer: COMMERCIAL

## 2021-03-30 ENCOUNTER — HOSPITAL ENCOUNTER (OUTPATIENT)
Dept: LAB | Facility: MEDICAL CENTER | Age: 66
End: 2021-03-30
Attending: FAMILY MEDICINE
Payer: COMMERCIAL

## 2021-03-30 LAB
ALBUMIN SERPL BCP-MCNC: 4.9 G/DL (ref 3.2–4.9)
ALBUMIN/GLOB SERPL: 1.6 G/DL
ALP SERPL-CCNC: 101 U/L (ref 30–99)
ALT SERPL-CCNC: 21 U/L (ref 2–50)
ANION GAP SERPL CALC-SCNC: 10 MMOL/L (ref 7–16)
AST SERPL-CCNC: 14 U/L (ref 12–45)
BASOPHILS # BLD AUTO: 0.7 % (ref 0–1.8)
BASOPHILS # BLD: 0.06 K/UL (ref 0–0.12)
BILIRUB SERPL-MCNC: 0.3 MG/DL (ref 0.1–1.5)
BUN SERPL-MCNC: 15 MG/DL (ref 8–22)
CALCIUM SERPL-MCNC: 10.3 MG/DL (ref 8.5–10.5)
CHLORIDE SERPL-SCNC: 101 MMOL/L (ref 96–112)
CO2 SERPL-SCNC: 29 MMOL/L (ref 20–33)
CREAT SERPL-MCNC: 0.77 MG/DL (ref 0.5–1.4)
CRP SERPL HS-MCNC: 1.57 MG/DL (ref 0–0.75)
EOSINOPHIL # BLD AUTO: 0.27 K/UL (ref 0–0.51)
EOSINOPHIL NFR BLD: 3.3 % (ref 0–6.9)
ERYTHROCYTE [DISTWIDTH] IN BLOOD BY AUTOMATED COUNT: 42.1 FL (ref 35.9–50)
GLOBULIN SER CALC-MCNC: 3 G/DL (ref 1.9–3.5)
GLUCOSE SERPL-MCNC: 101 MG/DL (ref 65–99)
HCT VFR BLD AUTO: 45.7 % (ref 37–47)
HGB BLD-MCNC: 14.9 G/DL (ref 12–16)
IMM GRANULOCYTES # BLD AUTO: 0.03 K/UL (ref 0–0.11)
IMM GRANULOCYTES NFR BLD AUTO: 0.4 % (ref 0–0.9)
LYMPHOCYTES # BLD AUTO: 1.76 K/UL (ref 1–4.8)
LYMPHOCYTES NFR BLD: 21.7 % (ref 22–41)
MCH RBC QN AUTO: 30.4 PG (ref 27–33)
MCHC RBC AUTO-ENTMCNC: 32.6 G/DL (ref 33.6–35)
MCV RBC AUTO: 93.3 FL (ref 81.4–97.8)
MONOCYTES # BLD AUTO: 0.6 K/UL (ref 0–0.85)
MONOCYTES NFR BLD AUTO: 7.4 % (ref 0–13.4)
NEUTROPHILS # BLD AUTO: 5.38 K/UL (ref 2–7.15)
NEUTROPHILS NFR BLD: 66.5 % (ref 44–72)
NRBC # BLD AUTO: 0 K/UL
NRBC BLD-RTO: 0 /100 WBC
PLATELET # BLD AUTO: 315 K/UL (ref 164–446)
PMV BLD AUTO: 10.8 FL (ref 9–12.9)
POTASSIUM SERPL-SCNC: 3.8 MMOL/L (ref 3.6–5.5)
PROT SERPL-MCNC: 7.9 G/DL (ref 6–8.2)
RBC # BLD AUTO: 4.9 M/UL (ref 4.2–5.4)
RHEUMATOID FACT SER IA-ACNC: 10 IU/ML (ref 0–14)
SODIUM SERPL-SCNC: 140 MMOL/L (ref 135–145)
WBC # BLD AUTO: 8.1 K/UL (ref 4.8–10.8)

## 2021-03-30 PROCEDURE — 86038 ANTINUCLEAR ANTIBODIES: CPT

## 2021-03-30 PROCEDURE — 86140 C-REACTIVE PROTEIN: CPT

## 2021-03-30 PROCEDURE — 84482 T3 REVERSE: CPT

## 2021-03-30 PROCEDURE — 84439 ASSAY OF FREE THYROXINE: CPT

## 2021-03-30 PROCEDURE — 84140 ASSAY OF PREGNENOLONE: CPT

## 2021-03-30 PROCEDURE — 80053 COMPREHEN METABOLIC PANEL: CPT

## 2021-03-30 PROCEDURE — 84443 ASSAY THYROID STIM HORMONE: CPT

## 2021-03-30 PROCEDURE — 84481 FREE ASSAY (FT-3): CPT

## 2021-03-30 PROCEDURE — 85652 RBC SED RATE AUTOMATED: CPT

## 2021-03-30 PROCEDURE — 82627 DEHYDROEPIANDROSTERONE: CPT

## 2021-03-30 PROCEDURE — 36415 COLL VENOUS BLD VENIPUNCTURE: CPT

## 2021-03-30 PROCEDURE — 85025 COMPLETE CBC W/AUTO DIFF WBC: CPT

## 2021-03-30 PROCEDURE — 86431 RHEUMATOID FACTOR QUANT: CPT

## 2021-03-31 ENCOUNTER — IMMUNIZATION (OUTPATIENT)
Dept: FAMILY PLANNING/WOMEN'S HEALTH CLINIC | Facility: IMMUNIZATION CENTER | Age: 66
End: 2021-03-31
Payer: COMMERCIAL

## 2021-03-31 DIAGNOSIS — Z23 ENCOUNTER FOR VACCINATION: Primary | ICD-10-CM

## 2021-03-31 LAB
DHEA-S SERPL-MCNC: 98.6 UG/DL (ref 18.9–205)
ERYTHROCYTE [SEDIMENTATION RATE] IN BLOOD BY WESTERGREN METHOD: 30 MM/HOUR (ref 0–30)
T3FREE SERPL-MCNC: 2.91 PG/ML (ref 2–4.4)
T4 FREE SERPL-MCNC: 0.95 NG/DL (ref 0.93–1.7)
TSH SERPL DL<=0.005 MIU/L-ACNC: 0.19 UIU/ML (ref 0.38–5.33)

## 2021-03-31 PROCEDURE — 91300 PFIZER SARS-COV-2 VACCINE: CPT

## 2021-03-31 PROCEDURE — 0001A PFIZER SARS-COV-2 VACCINE: CPT

## 2021-04-01 LAB
NUCLEAR IGG SER QL IA: NORMAL
PREG SERPL-MCNC: 19 NG/DL (ref 15–132)

## 2021-04-04 LAB — T3REVERSE SERPL-MCNC: 11.2 NG/DL (ref 9–27)

## 2021-04-09 ENCOUNTER — APPOINTMENT (OUTPATIENT)
Dept: RADIOLOGY | Facility: MEDICAL CENTER | Age: 66
End: 2021-04-09
Attending: INTERNAL MEDICINE
Payer: COMMERCIAL

## 2021-04-23 ENCOUNTER — IMMUNIZATION (OUTPATIENT)
Dept: FAMILY PLANNING/WOMEN'S HEALTH CLINIC | Facility: IMMUNIZATION CENTER | Age: 66
End: 2021-04-23
Payer: COMMERCIAL

## 2021-04-23 DIAGNOSIS — Z23 ENCOUNTER FOR VACCINATION: Primary | ICD-10-CM

## 2021-04-23 PROCEDURE — 0002A PFIZER SARS-COV-2 VACCINE: CPT

## 2021-04-23 PROCEDURE — 91300 PFIZER SARS-COV-2 VACCINE: CPT

## 2021-04-27 ENCOUNTER — HOSPITAL ENCOUNTER (OUTPATIENT)
Dept: RADIOLOGY | Facility: MEDICAL CENTER | Age: 66
End: 2021-04-27
Attending: INTERNAL MEDICINE
Payer: COMMERCIAL

## 2021-04-27 DIAGNOSIS — M85.89 DISAPPEARING BONE DISEASE: ICD-10-CM

## 2021-04-27 PROCEDURE — 77080 DXA BONE DENSITY AXIAL: CPT

## 2021-06-07 ENCOUNTER — HOSPITAL ENCOUNTER (OUTPATIENT)
Dept: LAB | Facility: MEDICAL CENTER | Age: 66
End: 2021-06-07
Attending: INTERNAL MEDICINE
Payer: COMMERCIAL

## 2021-06-07 LAB
ALBUMIN SERPL BCP-MCNC: 4.8 G/DL (ref 3.2–4.9)
ALBUMIN/GLOB SERPL: 1.8 G/DL
ALP SERPL-CCNC: 60 U/L (ref 30–99)
ALT SERPL-CCNC: 16 U/L (ref 2–50)
ANION GAP SERPL CALC-SCNC: 11 MMOL/L (ref 7–16)
AST SERPL-CCNC: 20 U/L (ref 12–45)
BILIRUB SERPL-MCNC: 0.3 MG/DL (ref 0.1–1.5)
BUN SERPL-MCNC: 12 MG/DL (ref 8–22)
CALCIUM SERPL-MCNC: 9.3 MG/DL (ref 8.4–10.2)
CHLORIDE SERPL-SCNC: 102 MMOL/L (ref 96–112)
CO2 SERPL-SCNC: 25 MMOL/L (ref 20–33)
CREAT SERPL-MCNC: 0.62 MG/DL (ref 0.5–1.4)
GLOBULIN SER CALC-MCNC: 2.7 G/DL (ref 1.9–3.5)
GLUCOSE SERPL-MCNC: 116 MG/DL (ref 65–99)
MAGNESIUM SERPL-MCNC: 2.4 MG/DL (ref 1.5–2.5)
PHOSPHATE SERPL-MCNC: 3 MG/DL (ref 2.5–4.5)
POTASSIUM SERPL-SCNC: 3.9 MMOL/L (ref 3.6–5.5)
PROT SERPL-MCNC: 7.5 G/DL (ref 6–8.2)
SODIUM SERPL-SCNC: 138 MMOL/L (ref 135–145)

## 2021-06-07 PROCEDURE — 83735 ASSAY OF MAGNESIUM: CPT

## 2021-06-07 PROCEDURE — 80053 COMPREHEN METABOLIC PANEL: CPT

## 2021-06-07 PROCEDURE — 82306 VITAMIN D 25 HYDROXY: CPT

## 2021-06-07 PROCEDURE — 36415 COLL VENOUS BLD VENIPUNCTURE: CPT

## 2021-06-07 PROCEDURE — 84100 ASSAY OF PHOSPHORUS: CPT

## 2021-06-09 LAB — 25(OH)D3 SERPL-MCNC: 88 NG/ML (ref 30–80)

## 2021-06-25 ENCOUNTER — OFFICE VISIT (OUTPATIENT)
Dept: INTERNAL MEDICINE | Facility: IMAGING CENTER | Age: 66
End: 2021-06-25
Payer: COMMERCIAL

## 2021-06-25 VITALS
HEIGHT: 62 IN | WEIGHT: 101.85 LBS | DIASTOLIC BLOOD PRESSURE: 60 MMHG | BODY MASS INDEX: 18.74 KG/M2 | RESPIRATION RATE: 17 BRPM | OXYGEN SATURATION: 94 % | SYSTOLIC BLOOD PRESSURE: 118 MMHG | TEMPERATURE: 97.8 F | HEART RATE: 90 BPM

## 2021-06-25 DIAGNOSIS — L02.224 BOIL OF GROIN: ICD-10-CM

## 2021-06-25 PROCEDURE — 99214 OFFICE O/P EST MOD 30 MIN: CPT | Performed by: FAMILY MEDICINE

## 2021-06-25 RX ORDER — HYDROCORTISONE 5 MG/1
TABLET ORAL
COMMUNITY
Start: 2021-05-28 | End: 2021-06-28

## 2021-06-25 RX ORDER — TIAGABINE HYDROCHLORIDE 2 MG/1
TABLET, FILM COATED ORAL
COMMUNITY
Start: 2021-06-04 | End: 2021-06-28

## 2021-06-25 RX ORDER — SULFAMETHOXAZOLE AND TRIMETHOPRIM 800; 160 MG/1; MG/1
1 TABLET ORAL 2 TIMES DAILY
Qty: 14 TABLET | Refills: 0 | Status: SHIPPED | OUTPATIENT
Start: 2021-06-25 | End: 2021-07-02

## 2021-06-25 RX ORDER — LETROZOLE 2.5 MG/1
2.5 TABLET, FILM COATED ORAL EVERY MORNING
COMMUNITY
Start: 2021-06-18 | End: 2022-10-31

## 2021-06-25 RX ORDER — DULOXETIN HYDROCHLORIDE 60 MG/1
60 CAPSULE, DELAYED RELEASE ORAL
COMMUNITY
Start: 2021-06-14

## 2021-06-25 RX ORDER — ESTRADIOL 0.1 MG/G
1 CREAM VAGINAL
COMMUNITY
Start: 2021-03-31 | End: 2023-08-18 | Stop reason: SDUPTHER

## 2021-06-25 ASSESSMENT — FIBROSIS 4 INDEX: FIB4 SCORE: 1

## 2021-06-25 NOTE — PROGRESS NOTES
"Chief Complaint   Patient presents with   • Bug Bite     Noticed on Wednesday - right groin        HPI:  Patient is a 65 y.o. female established patient who presents today for evaluation of new right groin area \"bug bite\" that started bothering her on Wednesday. She reports sitting outside in shorts and felt a very sharp pain in right groin area. She subsequently inspected the area and found a very painful raised skin mass. She denies associated drainage, no odor, no skin streaking, and no associated acute illness symptoms. She has not applied anything to affected area to date.     Patient Active Problem List    Diagnosis Date Noted   • Body mass index (BMI) 19.9 or less, adult 06/29/2018   • Mixed dyslipidemia 02/12/2018   • Post menopausal syndrome 02/12/2018   • Vitamin D deficiency 02/12/2018   • Chronic right hip pain 05/21/2017   • History of breast cancer 05/08/2017   • Chronic narcotic dependence (HCC) 05/05/2017   • Diverticulosis of large intestine without hemorrhage, per 12/23/16 COLONOSCOPY 01/14/2017   • Adenomatous polyp of colon, per 12/23/16 COLONOSCOPY 01/14/2017   • Atherosclerosis of right carotid artery, mild 12/15/2016   • History of opthalmic migraine 07/12/2016   • Frozen shoulder 03/08/2016   • Irritable bowel syndrome (IBS) 01/19/2016   • Family history of colon cancer    • Chronic pain 08/13/2013   • Primary insomnia    • Chronic pelvic pain in female        Past medical, surgical, family, and social history was reviewed and updated in Epic chart by me today.     Medications and allergies reviewed and updated in Epic chart by me today.     ROS:  Pertinent positives listed above in HPI. All other systems have been reviewed and are negative.    PE:   /60 (BP Location: Left arm, Patient Position: Sitting, BP Cuff Size: Adult)   Pulse 90   Temp 36.6 °C (97.8 °F) (Temporal)   Resp 17   Ht 1.575 m (5' 2\")   Wt 46.2 kg (101 lb 13.6 oz)   SpO2 94%   BMI 18.63 kg/m²   Vital signs " reviewed with patient.     Gen: Well developed; well nourished; no acute distress; well appearance   Groin: walnut size elongated skin boil noted on right groin area lateral to typical underwear line margin. Significant skin induration and tenderness noted - no drainage and mass is not soft enough to drain at this time. No skin streaking, no ecchymosis, and no associated concerns present  Skin: Warm and dry; no rashes noted   Neuro: No focal deficits noted   Psych: AAOx4; mood and affect are appropriate    A/P:  1. Boil of groin  New condition that started on Wednesday. Unclear if this is due to insect bite. Recommend patient apply cold compresses often for pain management, ok to take OTC meds as needed for comfort, start BActrim and Bactroban use today, and follow clinical response. New RX sent to pharmacy, and patient encouraged to contact me if condition does not improve.   - sulfamethoxazole-trimethoprim (BACTRIM DS) 800-160 MG tablet; Take 1 tablet by mouth 2 times a day for 7 days.  Dispense: 14 tablet; Refill: 0  - mupirocin (BACTROBAN) 2 % Ointment; Apply 1 Application topically 2 times a day for 7 days.  Dispense: 22 g; Refill: 1     Patient is to follow up with me next week for follow up exam.

## 2021-06-28 ENCOUNTER — HOSPITAL ENCOUNTER (EMERGENCY)
Facility: MEDICAL CENTER | Age: 66
End: 2021-06-28
Attending: EMERGENCY MEDICINE
Payer: COMMERCIAL

## 2021-06-28 ENCOUNTER — OFFICE VISIT (OUTPATIENT)
Dept: INTERNAL MEDICINE | Facility: IMAGING CENTER | Age: 66
End: 2021-06-28
Payer: COMMERCIAL

## 2021-06-28 VITALS
WEIGHT: 100.09 LBS | OXYGEN SATURATION: 97 % | DIASTOLIC BLOOD PRESSURE: 61 MMHG | TEMPERATURE: 98 F | RESPIRATION RATE: 16 BRPM | HEIGHT: 62 IN | BODY MASS INDEX: 18.42 KG/M2 | HEART RATE: 88 BPM | SYSTOLIC BLOOD PRESSURE: 119 MMHG

## 2021-06-28 VITALS
DIASTOLIC BLOOD PRESSURE: 60 MMHG | OXYGEN SATURATION: 93 % | SYSTOLIC BLOOD PRESSURE: 98 MMHG | RESPIRATION RATE: 14 BRPM | HEART RATE: 91 BPM | TEMPERATURE: 98.6 F

## 2021-06-28 DIAGNOSIS — L03.314 CELLULITIS OF RIGHT GROIN: ICD-10-CM

## 2021-06-28 DIAGNOSIS — L08.9 INFECTED SKIN LESION: ICD-10-CM

## 2021-06-28 LAB
ALBUMIN SERPL BCP-MCNC: 4.7 G/DL (ref 3.2–4.9)
ALBUMIN/GLOB SERPL: 1.5 G/DL
ALP SERPL-CCNC: 76 U/L (ref 30–99)
ALT SERPL-CCNC: 11 U/L (ref 2–50)
ANION GAP SERPL CALC-SCNC: 12 MMOL/L (ref 7–16)
AST SERPL-CCNC: 18 U/L (ref 12–45)
BASOPHILS # BLD AUTO: 0.7 % (ref 0–1.8)
BASOPHILS # BLD: 0.05 K/UL (ref 0–0.12)
BILIRUB SERPL-MCNC: 0.3 MG/DL (ref 0.1–1.5)
BUN SERPL-MCNC: 15 MG/DL (ref 8–22)
CALCIUM SERPL-MCNC: 9.7 MG/DL (ref 8.4–10.2)
CHLORIDE SERPL-SCNC: 99 MMOL/L (ref 96–112)
CO2 SERPL-SCNC: 23 MMOL/L (ref 20–33)
CREAT SERPL-MCNC: 0.65 MG/DL (ref 0.5–1.4)
EOSINOPHIL # BLD AUTO: 0.09 K/UL (ref 0–0.51)
EOSINOPHIL NFR BLD: 1.2 % (ref 0–6.9)
ERYTHROCYTE [DISTWIDTH] IN BLOOD BY AUTOMATED COUNT: 42.2 FL (ref 35.9–50)
GLOBULIN SER CALC-MCNC: 3.2 G/DL (ref 1.9–3.5)
GLUCOSE SERPL-MCNC: 81 MG/DL (ref 65–99)
HCT VFR BLD AUTO: 39.1 % (ref 37–47)
HGB BLD-MCNC: 12.8 G/DL (ref 12–16)
IMM GRANULOCYTES # BLD AUTO: 0.03 K/UL (ref 0–0.11)
IMM GRANULOCYTES NFR BLD AUTO: 0.4 % (ref 0–0.9)
LYMPHOCYTES # BLD AUTO: 0.92 K/UL (ref 1–4.8)
LYMPHOCYTES NFR BLD: 12.8 % (ref 22–41)
MCH RBC QN AUTO: 30.3 PG (ref 27–33)
MCHC RBC AUTO-ENTMCNC: 32.7 G/DL (ref 33.6–35)
MCV RBC AUTO: 92.4 FL (ref 81.4–97.8)
MONOCYTES # BLD AUTO: 0.66 K/UL (ref 0–0.85)
MONOCYTES NFR BLD AUTO: 9.2 % (ref 0–13.4)
NEUTROPHILS # BLD AUTO: 5.46 K/UL (ref 2–7.15)
NEUTROPHILS NFR BLD: 75.7 % (ref 44–72)
NRBC # BLD AUTO: 0 K/UL
NRBC BLD-RTO: 0 /100 WBC
PLATELET # BLD AUTO: 272 K/UL (ref 164–446)
PMV BLD AUTO: 9.2 FL (ref 9–12.9)
POTASSIUM SERPL-SCNC: 3.8 MMOL/L (ref 3.6–5.5)
PROT SERPL-MCNC: 7.9 G/DL (ref 6–8.2)
RBC # BLD AUTO: 4.23 M/UL (ref 4.2–5.4)
SODIUM SERPL-SCNC: 134 MMOL/L (ref 135–145)
WBC # BLD AUTO: 7.2 K/UL (ref 4.8–10.8)

## 2021-06-28 PROCEDURE — 36415 COLL VENOUS BLD VENIPUNCTURE: CPT

## 2021-06-28 PROCEDURE — 99284 EMERGENCY DEPT VISIT MOD MDM: CPT

## 2021-06-28 PROCEDURE — 700105 HCHG RX REV CODE 258: Performed by: EMERGENCY MEDICINE

## 2021-06-28 PROCEDURE — 80053 COMPREHEN METABOLIC PANEL: CPT

## 2021-06-28 PROCEDURE — 96365 THER/PROPH/DIAG IV INF INIT: CPT

## 2021-06-28 PROCEDURE — 87040 BLOOD CULTURE FOR BACTERIA: CPT | Mod: 91

## 2021-06-28 PROCEDURE — 99213 OFFICE O/P EST LOW 20 MIN: CPT | Performed by: FAMILY MEDICINE

## 2021-06-28 PROCEDURE — 85025 COMPLETE CBC W/AUTO DIFF WBC: CPT

## 2021-06-28 PROCEDURE — 700111 HCHG RX REV CODE 636 W/ 250 OVERRIDE (IP): Performed by: EMERGENCY MEDICINE

## 2021-06-28 RX ORDER — ASCORBIC ACID 125 MG
5000 TABLET,CHEWABLE ORAL EVERY MORNING
COMMUNITY

## 2021-06-28 RX ORDER — CEPHALEXIN 500 MG/1
500 CAPSULE ORAL 4 TIMES DAILY
Qty: 40 CAPSULE | Refills: 0 | Status: SHIPPED | OUTPATIENT
Start: 2021-06-28 | End: 2021-07-08

## 2021-06-28 RX ADMIN — SODIUM CHLORIDE 3 G: 900 INJECTION INTRAVENOUS at 12:24

## 2021-06-28 ASSESSMENT — LIFESTYLE VARIABLES
TOTAL SCORE: 0
HAVE PEOPLE ANNOYED YOU BY CRITICIZING YOUR DRINKING: NO
TOTAL SCORE: 0
DO YOU DRINK ALCOHOL: NO
CONSUMPTION TOTAL: INCOMPLETE
EVER HAD A DRINK FIRST THING IN THE MORNING TO STEADY YOUR NERVES TO GET RID OF A HANGOVER: NO
HAVE YOU EVER FELT YOU SHOULD CUT DOWN ON YOUR DRINKING: NO
TOTAL SCORE: 0
EVER FELT BAD OR GUILTY ABOUT YOUR DRINKING: NO

## 2021-06-28 ASSESSMENT — FIBROSIS 4 INDEX: FIB4 SCORE: 1

## 2021-06-28 NOTE — ED NOTES
Med rec completed per Pt at bedside.  Allergies reviewed with Pt.  Pt is currently on 7 day courses of Bactrim -160 mg 1 tablet 2 times per day and mupirocin 2% ointment 1 application 2 times per day, started 6/25/2021.  Pt's [facility]: [facility], [place].

## 2021-06-28 NOTE — ED NOTES
ERP at bedside. Pt agrees with plan of care discussed by ERP. AIDET acknowledged with patient. IV established. Blood sent to lab. BC +2 sent to lab. Antibiotics started.  Bernardo in low position, side rail up for pt safety. Call light within reach. Will continue to monitor.

## 2021-06-28 NOTE — PROGRESS NOTES
Chief Complaint   Patient presents with   • Follow-Up     right groin infection       HPI:  Patient is a 65 y.o. female established patient who presents today for a follow up visit regarding right groin infected boil first examined by me on 6/25/2021. Patient has been compliant with Bactrim/ Bactroban use but reports area is much larger and extremely painful to touch. She denies associated N/V/D/F but is in noticeable pain today.     Patient Active Problem List    Diagnosis Date Noted   • Body mass index (BMI) 19.9 or less, adult 06/29/2018   • Mixed dyslipidemia 02/12/2018   • Post menopausal syndrome 02/12/2018   • Vitamin D deficiency 02/12/2018   • Chronic right hip pain 05/21/2017   • History of breast cancer 05/08/2017   • Chronic narcotic dependence (HCC) 05/05/2017   • Diverticulosis of large intestine without hemorrhage, per 12/23/16 COLONOSCOPY 01/14/2017   • Adenomatous polyp of colon, per 12/23/16 COLONOSCOPY 01/14/2017   • Atherosclerosis of right carotid artery, mild 12/15/2016   • History of opthalmic migraine 07/12/2016   • Frozen shoulder 03/08/2016   • Irritable bowel syndrome (IBS) 01/19/2016   • Family history of colon cancer    • Chronic pain 08/13/2013   • Primary insomnia    • Chronic pelvic pain in female        Past medical, surgical, family, and social history was reviewed and updated in Epic chart by me today.     Medications and allergies reviewed and updated in Epic chart by me today.     ROS:  Pertinent positives listed above in HPI. All other systems have been reviewed and are negative.    PE:   BP (!) 98/60 (BP Location: Left arm, Patient Position: Sitting, BP Cuff Size: Adult)   Pulse 91   Temp 37 °C (98.6 °F) (Temporal)   Resp 14   SpO2 93%   Vital signs reviewed with patient.     Gen: Well developed; well nourished; non toxic appearance but in noticeable discomfort  Right groin area: last week there was walnut size elongated skin boil noted on right groin area lateral to  typical underwear line margin. Significant skin induration and tenderness noted - no drainage and mass is not soft enough to drain at that time. Today lesion has now doubled in size, overlying skin is macerated, and significant surrounding erythema present. Entire area is extremely pain for patient, even with light touch.  Skin: Warm and dry; no rashes noted   Neuro: No focal deficits noted   Psych: AAOx4; mood and affect are appropriate    A/P:  1. Infected skin lesion  Patient has been compliant with Bactrim/Bactroban use since our visit on 6/25/2021, and skin lesion is far worse today. Last week, the lesion appeared to be an infected skin boil - indurated with overlying skin intact. Today, lesion has doubled in size, skin is macerated and oozing with erythema surrounding entire area. Her pain is uncontrolled, and considering her overall condition, recommend patient proceed immediately to [facility] ER for evaluation and definitive treatment. Patient has failed oral antibiotic use and needs pain addressed also. I will follow case accordingly and have called transfer center to alert ER that she is coming. All questions answered.

## 2021-06-28 NOTE — ED TRIAGE NOTES
"Chief Complaint   Patient presents with   • Wound Infection     wound to R groin since last Thursday. pt on antibiotics prescribed by her PCP. pt still c/o worsening condition. Denies DM.     /90   Pulse 87   Temp 36.7 °C (98 °F) (Temporal)   Resp 16   Ht 1.575 m (5' 2\")   Wt 45.4 kg (100 lb 1.4 oz)   SpO2 95%   BMI 18.31 kg/m²     "

## 2021-06-28 NOTE — ED PROVIDER NOTES
ED Provider Note    CHIEF COMPLAINT  Chief Complaint   Patient presents with   • Wound Infection     wound to R groin since last Thursday. pt on antibiotics prescribed by her PCP. pt still c/o worsening condition. Denies DM.       HPI  Adrianne Martinez is a 65 y.o. female who presents for evaluation of an infected right groin, has been on Bactrim for this for the past 4 days and she seems to be worsening so she was sent in by her PCP.  She has drainage from the wound.  No fever, no nausea or vomiting.  She does not have diabetes.  Reports a history of fibromyalgia.  She states that the area of redness surrounding the area swelling has seemingly increased.  No other complaints.    REVIEW OF SYSTEMS  Negative for fever, vomiting, weakness, numbness.    PAST MEDICAL HISTORY   has a past medical history of Anesthesia, Bowel habit changes, Breast cancer (HCC), Cancer (HCC) (2017), Cancer (HCC), Cataract, Chronic pain, Chronic pelvic pain in female, Colon polyp, ENDOMETRIOSIS, Endometriosis, Family history of colon cancer, Gynecological disorder, Heart burn, Hepatitis, Hyperlipidemia, Indigestion, Insomnia, Jaundice (1967 ), Pain, Pain, Pneumonia (2006), Postmenopausal HRT (hormone replacement therapy), Psychiatric problem, Recurrent UTI, Seizure (HCC), Snoring, and Urine frequency.    SOCIAL HISTORY  Social History     Tobacco Use   • Smoking status: Never Smoker   • Smokeless tobacco: Never Used   Substance and Sexual Activity   • Alcohol use: Yes     Alcohol/week: 0.6 oz     Types: 1 Glasses of wine per week     Comment: 1-3 per week    • Drug use: No   • Sexual activity: Yes     Partners: Male       SURGICAL HISTORY   has a past surgical history that includes lumpectomy (2007); other orthopedic surgery (1964); breast biopsy; breast biopsy; laparoscopy (6830-7032); cataract extraction with iol (Bilateral); abdominal hysterectomy total (1999); ovarian cystectomy (1985); mastectomy (Left, 2/8/2017); node biopsy sentinel  "(2/8/2017); breast biopsy (Left, 2/16/2017); and lumpectomy (Left, Feb 2017).    CURRENT MEDICATIONS  I personally reviewed the medication list in the charting documentation.     ALLERGIES  Allergies   Allergen Reactions   • Meperidine Hives   • Oxycontin [Oxycodone Hcl] Vomiting     Able to take vicodin.   • Statins [Hmg-Coa-R Inhibitors] Diarrhea     Nausea and diarrhea   • Cherry Rash     Raw cherries    • Latex      rash   • Nsaids Rash     rash       PHYSICAL EXAM  VITAL SIGNS: /90   Pulse 87   Temp 36.7 °C (98 °F) (Temporal)   Resp 16   Ht 1.575 m (5' 2\")   Wt 45.4 kg (100 lb 1.4 oz)   SpO2 95%   BMI 18.31 kg/m²   Constitutional: Well appearing patient in no acute distress.  Awake and alert, not toxic nor ill in appearance.  HENT: Normocephalic, no obvious evidence of acute trauma.   Neck: Comfortable movement without any obvious restriction in the range of motion.  Eyes: Conjunctiva normal, Non-icteric.   Chest: Normal nonlabored respirations.  Skin: Inspection of the right groin reveals a significant amount of erythema and warm skin with a central area of swelling that is not fluctuant on palpation.  Bedside ultrasound reveals no organized hypoechoic fluid collection consistent with abscess..  Musculoskeletal: No obvious restriction in the range of motion in all major joints.   Neurologic: Alert, No obvious focal deficits noted.   Psychiatric: Affect normal for clinical presentation    DIAGNOSTIC STUDIES / PROCEDURES    LABS/EKGs  Results for orders placed or performed during the hospital encounter of 06/28/21   CBC WITH DIFFERENTIAL   Result Value Ref Range    WBC 7.2 4.8 - 10.8 K/uL    RBC 4.23 4.20 - 5.40 M/uL    Hemoglobin 12.8 12.0 - 16.0 g/dL    Hematocrit 39.1 37.0 - 47.0 %    MCV 92.4 81.4 - 97.8 fL    MCH 30.3 27.0 - 33.0 pg    MCHC 32.7 (L) 33.6 - 35.0 g/dL    RDW 42.2 35.9 - 50.0 fL    Platelet Count 272 164 - 446 K/uL    MPV 9.2 9.0 - 12.9 fL    Neutrophils-Polys 75.70 (H) 44.00 - " 72.00 %    Lymphocytes 12.80 (L) 22.00 - 41.00 %    Monocytes 9.20 0.00 - 13.40 %    Eosinophils 1.20 0.00 - 6.90 %    Basophils 0.70 0.00 - 1.80 %    Immature Granulocytes 0.40 0.00 - 0.90 %    Nucleated RBC 0.00 /100 WBC    Neutrophils (Absolute) 5.46 2.00 - 7.15 K/uL    Lymphs (Absolute) 0.92 (L) 1.00 - 4.80 K/uL    Monos (Absolute) 0.66 0.00 - 0.85 K/uL    Eos (Absolute) 0.09 0.00 - 0.51 K/uL    Baso (Absolute) 0.05 0.00 - 0.12 K/uL    Immature Granulocytes (abs) 0.03 0.00 - 0.11 K/uL    NRBC (Absolute) 0.00 K/uL   COMP METABOLIC PANEL   Result Value Ref Range    Sodium 134 (L) 135 - 145 mmol/L    Potassium 3.8 3.6 - 5.5 mmol/L    Chloride 99 96 - 112 mmol/L    Co2 23 20 - 33 mmol/L    Anion Gap 12.0 7.0 - 16.0    Glucose 81 65 - 99 mg/dL    Bun 15 8 - 22 mg/dL    Creatinine 0.65 0.50 - 1.40 mg/dL    Calcium 9.7 8.4 - 10.2 mg/dL    AST(SGOT) 18 12 - 45 U/L    ALT(SGPT) 11 2 - 50 U/L    Alkaline Phosphatase 76 30 - 99 U/L    Total Bilirubin 0.3 0.1 - 1.5 mg/dL    Albumin 4.7 3.2 - 4.9 g/dL    Total Protein 7.9 6.0 - 8.2 g/dL    Globulin 3.2 1.9 - 3.5 g/dL    A-G Ratio 1.5 g/dL   ESTIMATED GFR   Result Value Ref Range    GFR If African American >60 >60 mL/min/1.73 m 2    GFR If Non African American >60 >60 mL/min/1.73 m 2        COURSE & MEDICAL DECISION MAKING  Pertinent Labs & Imaging studies reviewed. (See chart for details)    Encounter Summary: This is a very pleasant 65 y.o. female who unfortunately required evaluation in the emergency department today with cellulitis of the right groin, a central area of drainage without fluctuance and bedside ultrasound does not reveal an organized abscess.  She has been on Bactrim, given the degree of surrounding cellulitis I think that coverage needs to be expanded, she will receive an intravenous dose of Unasyn here in the emergency department a couple blood test will be obtained. -------- WBC is normal.  Blood work is otherwise unrevealing.  She received a dose of  Unasyn here in the emergency department is being discharged with a prescription for Keflex to add to the Bactrim, this will better cover her cellulitis.  Discharged home in stable condition with strict return instructions discussed      DISPOSITION: Discharge Home      FINAL IMPRESSION  1. Cellulitis of right groin        This dictation was created using voice recognition software. The accuracy of the dictation is limited to the abilities of the software. I expect there may be some errors of grammar and possibly content. The nursing notes were reviewed and certain aspects of this information were incorporated into this note.    Electronically signed by: Noah Spencer M.D., 6/28/2021 11:56 AM

## 2021-07-03 LAB
BACTERIA BLD CULT: NORMAL
BACTERIA BLD CULT: NORMAL
SIGNIFICANT IND 70042: NORMAL
SIGNIFICANT IND 70042: NORMAL
SITE SITE: NORMAL
SITE SITE: NORMAL
SOURCE SOURCE: NORMAL
SOURCE SOURCE: NORMAL

## 2021-07-06 ENCOUNTER — OFFICE VISIT (OUTPATIENT)
Dept: INTERNAL MEDICINE | Facility: IMAGING CENTER | Age: 66
End: 2021-07-06
Payer: COMMERCIAL

## 2021-07-06 VITALS
WEIGHT: 100.09 LBS | RESPIRATION RATE: 17 BRPM | HEIGHT: 62 IN | BODY MASS INDEX: 18.42 KG/M2 | DIASTOLIC BLOOD PRESSURE: 62 MMHG | SYSTOLIC BLOOD PRESSURE: 118 MMHG | TEMPERATURE: 97.6 F | OXYGEN SATURATION: 92 % | HEART RATE: 87 BPM

## 2021-07-06 DIAGNOSIS — Z09 HOSPITAL DISCHARGE FOLLOW-UP: ICD-10-CM

## 2021-07-06 DIAGNOSIS — L03.314 CELLULITIS OF GROIN, RIGHT: ICD-10-CM

## 2021-07-06 PROCEDURE — 99213 OFFICE O/P EST LOW 20 MIN: CPT | Performed by: FAMILY MEDICINE

## 2021-07-06 ASSESSMENT — FIBROSIS 4 INDEX: FIB4 SCORE: 1.26

## 2021-07-06 NOTE — PROGRESS NOTES
Chief Complaint   Patient presents with   • Hospital Follow-up     wound check right groin       HPI:  Patient is a 65 y.o. female established patient who presents today for a hospital follow up visit. I saw patient on 6/28/2021 for right groin infected skin lesion that was significantly worse despite Bactrim and Bactroban use, and I directed patient to [facility] for further evaluation. She was seen at [facility] ER on 6/28/2021, and bedside US showed no sign of abscess. She received one dose of IV Unasyn and Keflex was added to current Bactrim prescription. Patient was discharged home from ER with right groin cellulitis diagnosis and reports condition has significantly improved. She denies current pain issues (pain was disproportionate 10+/10 at our last visit), and she is working hard to keep area clean and dry. She denies new health concerns and is in good spirits today.     Patient Active Problem List    Diagnosis Date Noted   • Body mass index (BMI) 19.9 or less, adult 06/29/2018   • Mixed dyslipidemia 02/12/2018   • Post menopausal syndrome 02/12/2018   • Vitamin D deficiency 02/12/2018   • Chronic right hip pain 05/21/2017   • History of breast cancer 05/08/2017   • Chronic narcotic dependence (HCC) 05/05/2017   • Diverticulosis of large intestine without hemorrhage, per 12/23/16 COLONOSCOPY 01/14/2017   • Adenomatous polyp of colon, per 12/23/16 COLONOSCOPY 01/14/2017   • Atherosclerosis of right carotid artery, mild 12/15/2016   • History of opthalmic migraine 07/12/2016   • Frozen shoulder 03/08/2016   • Irritable bowel syndrome (IBS) 01/19/2016   • Family history of colon cancer    • Chronic pain 08/13/2013   • Primary insomnia    • Chronic pelvic pain in female        Past medical, surgical, family, and social history was reviewed and updated in Epic chart by me today.     Medications and allergies reviewed and updated in Epic chart by me today.     ROS:  Pertinent positives listed above in  "HPI. All other systems have been reviewed and are negative.    PE:   /62 (BP Location: Left arm, Patient Position: Sitting, BP Cuff Size: Adult)   Pulse 87   Temp 36.4 °C (97.6 °F) (Temporal)   Resp 17   Ht 1.575 m (5' 2\")   Wt 45.4 kg (100 lb 1.4 oz)   SpO2 92%   BMI 18.31 kg/m²   Vital signs reviewed with patient.     Gen: Well developed; well nourished; no acute distress; age appropriate appearance   Right groin: markedly improved cellulitis with one small open skin lesion present - no odor nor drainage present; no surrounded erythema   Skin: Warm and dry; no rashes noted   Neuro: No focal deficits noted   Psych: AAOx4; mood and affect are appropriate    A/P:  1. Hospital discharge follow-up  Patient was seen at [facility] ER 6/28/2021, and I have reviewed all pertinent ER records prior to our visit today.     2. Cellulitis of groin, right  Markedly improved today overall, and patient is pain free at this time. Recommend patient complete current oral abx, continue to keep area clean and dry, and avoid hot tubs/swimming pools/ocean until area completely healed.           "

## 2021-07-19 DIAGNOSIS — B37.2 YEAST DERMATITIS: ICD-10-CM

## 2021-07-19 RX ORDER — FLUCONAZOLE 200 MG/1
200 TABLET ORAL DAILY
Qty: 7 TABLET | Refills: 0 | Status: SHIPPED | OUTPATIENT
Start: 2021-07-19 | End: 2021-07-26

## 2021-07-19 RX ORDER — NYSTATIN 100000 [USP'U]/G
1 POWDER TOPICAL 2 TIMES DAILY
Qty: 15 G | Refills: 1 | Status: SHIPPED | OUTPATIENT
Start: 2021-07-19 | End: 2021-07-26

## 2021-07-23 DIAGNOSIS — L03.314 CELLULITIS OF GROIN, RIGHT: ICD-10-CM

## 2021-07-23 RX ORDER — CEPHALEXIN 500 MG/1
500 CAPSULE ORAL 4 TIMES DAILY
Qty: 28 CAPSULE | Refills: 0 | Status: SHIPPED | OUTPATIENT
Start: 2021-07-23 | End: 2021-07-30

## 2021-07-23 RX ORDER — SULFAMETHOXAZOLE AND TRIMETHOPRIM 800; 160 MG/1; MG/1
1 TABLET ORAL 2 TIMES DAILY
Qty: 14 TABLET | Refills: 0 | Status: SHIPPED | OUTPATIENT
Start: 2021-07-23 | End: 2021-07-30

## 2021-08-04 DIAGNOSIS — L03.314 CELLULITIS OF GROIN, RIGHT: ICD-10-CM

## 2021-08-04 DIAGNOSIS — B37.2 YEAST DERMATITIS: ICD-10-CM

## 2021-08-30 ENCOUNTER — HOSPITAL ENCOUNTER (OUTPATIENT)
Dept: RADIOLOGY | Facility: MEDICAL CENTER | Age: 66
End: 2021-08-30
Attending: INTERNAL MEDICINE
Payer: COMMERCIAL

## 2021-08-30 DIAGNOSIS — D24.2 BREAST FIBROADENOMA, LEFT: ICD-10-CM

## 2021-08-30 DIAGNOSIS — C50.312 MALIGNANT NEOPLASM OF LOWER-INNER QUADRANT OF LEFT FEMALE BREAST, UNSPECIFIED ESTROGEN RECEPTOR STATUS (HCC): ICD-10-CM

## 2021-08-30 PROCEDURE — 76641 ULTRASOUND BREAST COMPLETE: CPT

## 2021-08-30 PROCEDURE — 77063 BREAST TOMOSYNTHESIS BI: CPT

## 2021-09-17 ENCOUNTER — APPOINTMENT (OUTPATIENT)
Dept: INTERNAL MEDICINE | Facility: IMAGING CENTER | Age: 66
End: 2021-09-17
Payer: COMMERCIAL

## 2021-10-21 DIAGNOSIS — F51.01 PRIMARY INSOMNIA: ICD-10-CM

## 2021-10-21 RX ORDER — ZOLPIDEM TARTRATE 10 MG/1
10 TABLET ORAL NIGHTLY PRN
Qty: 30 TABLET | Refills: 0 | Status: SHIPPED | OUTPATIENT
Start: 2021-10-21 | End: 2021-11-20

## 2021-12-20 ENCOUNTER — NON-PROVIDER VISIT (OUTPATIENT)
Dept: INTERNAL MEDICINE | Facility: IMAGING CENTER | Age: 66
End: 2021-12-20
Payer: COMMERCIAL

## 2021-12-20 ENCOUNTER — HOSPITAL ENCOUNTER (OUTPATIENT)
Facility: MEDICAL CENTER | Age: 66
End: 2021-12-20
Attending: FAMILY MEDICINE
Payer: COMMERCIAL

## 2021-12-20 DIAGNOSIS — Z20.822 ENCOUNTER FOR LABORATORY TESTING FOR COVID-19 VIRUS: ICD-10-CM

## 2021-12-20 PROCEDURE — U0005 INFEC AGEN DETEC AMPLI PROBE: HCPCS

## 2021-12-20 PROCEDURE — U0003 INFECTIOUS AGENT DETECTION BY NUCLEIC ACID (DNA OR RNA); SEVERE ACUTE RESPIRATORY SYNDROME CORONAVIRUS 2 (SARS-COV-2) (CORONAVIRUS DISEASE [COVID-19]), AMPLIFIED PROBE TECHNIQUE, MAKING USE OF HIGH THROUGHPUT TECHNOLOGIES AS DESCRIBED BY CMS-2020-01-R: HCPCS

## 2022-01-06 ENCOUNTER — PATIENT MESSAGE (OUTPATIENT)
Dept: HEALTH INFORMATION MANAGEMENT | Facility: OTHER | Age: 67
End: 2022-01-06
Payer: COMMERCIAL

## 2022-04-25 ENCOUNTER — OFFICE VISIT (OUTPATIENT)
Dept: INTERNAL MEDICINE | Facility: IMAGING CENTER | Age: 67
End: 2022-04-25
Payer: COMMERCIAL

## 2022-04-25 VITALS
WEIGHT: 100.09 LBS | TEMPERATURE: 97.8 F | HEIGHT: 62 IN | RESPIRATION RATE: 17 BRPM | OXYGEN SATURATION: 93 % | DIASTOLIC BLOOD PRESSURE: 62 MMHG | BODY MASS INDEX: 18.42 KG/M2 | SYSTOLIC BLOOD PRESSURE: 120 MMHG | HEART RATE: 95 BPM

## 2022-04-25 DIAGNOSIS — F51.01 PRIMARY INSOMNIA: ICD-10-CM

## 2022-04-25 DIAGNOSIS — Z00.00 HEALTH CARE MAINTENANCE: ICD-10-CM

## 2022-04-25 PROCEDURE — 99213 OFFICE O/P EST LOW 20 MIN: CPT | Performed by: FAMILY MEDICINE

## 2022-04-25 RX ORDER — ZOLPIDEM TARTRATE 10 MG/1
10 TABLET ORAL NIGHTLY PRN
Qty: 30 TABLET | Refills: 2 | Status: SHIPPED | OUTPATIENT
Start: 2022-04-25 | End: 2022-05-25

## 2022-04-25 ASSESSMENT — PATIENT HEALTH QUESTIONNAIRE - PHQ9: CLINICAL INTERPRETATION OF PHQ2 SCORE: 0

## 2022-04-25 ASSESSMENT — FIBROSIS 4 INDEX: FIB4 SCORE: 1.28

## 2022-04-25 NOTE — PROGRESS NOTES
"Chief Complaint   Patient presents with   • Medication Refill     Ambien       HPI:  Patient is a 66 y.o. female established patient who presents today to obtain a new Ambien prescription for primary insomnia management. She continues to benefit from medication use, denies medication related side effects, and uses this controlled medication in a safe manner. She [place]  [place] ([place] in the family), and she will obtain annual fasting lab draw/ make annual appointment with me upon return to [place]. She denies other new health concerns at this time and is in good spirits overall.     Patient Active Problem List    Diagnosis Date Noted   • Body mass index (BMI) 19.9 or less, adult 06/29/2018   • Mixed dyslipidemia 02/12/2018   • Post menopausal syndrome 02/12/2018   • Vitamin D deficiency 02/12/2018   • Chronic right hip pain 05/21/2017   • History of breast cancer 05/08/2017   • Chronic narcotic dependence (HCC) 05/05/2017   • Diverticulosis of large intestine without hemorrhage, per 12/23/16 COLONOSCOPY 01/14/2017   • Adenomatous polyp of colon, per 12/23/16 COLONOSCOPY 01/14/2017   • Atherosclerosis of right carotid artery, mild 12/15/2016   • History of opthalmic migraine 07/12/2016   • Frozen shoulder 03/08/2016   • Irritable bowel syndrome (IBS) 01/19/2016   • Family history of colon cancer    • Chronic pain 08/13/2013   • Primary insomnia    • Chronic pelvic pain in female        Past medical, surgical, family, and social history was reviewed and updated in Epic chart by me today.     Medications and allergies reviewed and updated in Epic chart by me today.     ROS:  Pertinent positives listed above in HPI. All other systems have been reviewed and are negative.    PE:   /62 (BP Location: Left arm, Patient Position: Sitting, BP Cuff Size: Adult)   Pulse 95   Temp 36.6 °C (97.8 °F) (Temporal)   Resp 17   Ht 1.575 m (5' 2\")   Wt 45.4 kg (100 lb 1.4 oz)   SpO2 93%   " BMI 18.31 kg/m²   Vital signs reviewed with patient.     Gen: Well developed; well nourished; no acute distress; age appropriate appearance   CV: Regular rate and rhythm; S1/ S2 present; no murmur, gallop or rub noted  Pulm: No respiratory distress; clear to ascultation b/l; no wheezing or stridor noted b/l  Skin: Warm and dry; no rashes noted   Neuro: No focal deficits noted   Psych: AAOx4; mood and affect are at baseline    A/P:  1. Primary insomnia  Stable/ patient can continue current PRN Ambien use for insomnia management.  reviewed today and no concerns noted. Pt is well versed in safe use of controlled medication, and benefit of use outweighs risk at this time. New RX sent to pharmacy.   - zolpidem (AMBIEN) 10 MG Tab; Take 1 Tablet by mouth at bedtime as needed for Sleep for up to 30 days.  Dispense: 30 Tablet; Refill: 2    2. Health care maintenance  Patient will make appointments for annual lab draw and annual appointment with me upon return to [place] from upcoming trip to [facility]. I will discuss screening colonoscopy referral options at annual appointment.

## 2022-04-27 DIAGNOSIS — R11.2 NAUSEA AND VOMITING IN ADULT: ICD-10-CM

## 2022-04-27 RX ORDER — ONDANSETRON 4 MG/1
4 TABLET, ORALLY DISINTEGRATING ORAL EVERY 6 HOURS PRN
Qty: 10 TABLET | Refills: 1 | Status: SHIPPED
Start: 2022-04-27 | End: 2022-10-31

## 2022-08-04 ENCOUNTER — OFFICE VISIT (OUTPATIENT)
Dept: INTERNAL MEDICINE | Facility: IMAGING CENTER | Age: 67
End: 2022-08-04
Payer: MEDICARE

## 2022-08-04 VITALS
SYSTOLIC BLOOD PRESSURE: 118 MMHG | RESPIRATION RATE: 17 BRPM | HEART RATE: 94 BPM | HEIGHT: 62 IN | DIASTOLIC BLOOD PRESSURE: 62 MMHG | WEIGHT: 100.09 LBS | OXYGEN SATURATION: 96 % | BODY MASS INDEX: 18.42 KG/M2 | TEMPERATURE: 98.1 F

## 2022-08-04 DIAGNOSIS — Z00.00 HEALTH CARE MAINTENANCE: ICD-10-CM

## 2022-08-04 DIAGNOSIS — H92.01 RIGHT EAR PAIN: ICD-10-CM

## 2022-08-04 PROCEDURE — 99213 OFFICE O/P EST LOW 20 MIN: CPT | Performed by: FAMILY MEDICINE

## 2022-08-04 ASSESSMENT — FIBROSIS 4 INDEX: FIB4 SCORE: 1.3

## 2022-08-07 DIAGNOSIS — E78.2 MIXED DYSLIPIDEMIA: Chronic | ICD-10-CM

## 2022-08-07 DIAGNOSIS — R53.83 OTHER FATIGUE: ICD-10-CM

## 2022-08-07 DIAGNOSIS — E55.9 VITAMIN D DEFICIENCY: Chronic | ICD-10-CM

## 2022-08-07 DIAGNOSIS — Z00.00 HEALTH CARE MAINTENANCE: ICD-10-CM

## 2022-08-07 DIAGNOSIS — R73.9 HYPERGLYCEMIA: ICD-10-CM

## 2022-08-08 NOTE — PROGRESS NOTES
"Chief Complaint   Patient presents with   • Otalgia     Right ear painful and full feeling - feels like she is underwater.        HPI:  Patient is a 67 y.o. female established patient who presents today for evaluation of new right ear pain/fullness feeling that started over the past few days while traveling via airplane. She denies associated N/V/D/F/sinus pain but feels like her right outer ear is swollen and general area is painful to touch. COVID test negative prior to our visit today.     Patient Active Problem List    Diagnosis Date Noted   • Body mass index (BMI) 19.9 or less, adult 06/29/2018   • Mixed dyslipidemia 02/12/2018   • Post menopausal syndrome 02/12/2018   • Vitamin D deficiency 02/12/2018   • Chronic right hip pain 05/21/2017   • History of breast cancer 05/08/2017   • Chronic narcotic dependence (HCC) 05/05/2017   • Diverticulosis of large intestine without hemorrhage, per 12/23/16 COLONOSCOPY 01/14/2017   • Adenomatous polyp of colon, per 12/23/16 COLONOSCOPY 01/14/2017   • Atherosclerosis of right carotid artery, mild 12/15/2016   • History of opthalmic migraine 07/12/2016   • Frozen shoulder 03/08/2016   • Irritable bowel syndrome (IBS) 01/19/2016   • Family history of colon cancer    • Chronic pain 08/13/2013   • Primary insomnia    • Chronic pelvic pain in female        Past medical, surgical, family, and social history was reviewed and updated in Epic chart by me today.     Medications and allergies reviewed and updated in Epic chart by me today.     ROS:  Pertinent positives listed above in HPI. All other systems have been reviewed and are negative.    PE:   /62 (BP Location: Left arm, Patient Position: Sitting, BP Cuff Size: Adult)   Pulse 94   Temp 36.7 °C (98.1 °F) (Temporal)   Resp 17   Ht 1.575 m (5' 2\")   Wt 45.4 kg (100 lb 1.4 oz)   SpO2 96%   BMI 18.31 kg/m²   Vital signs reviewed with patient.     Gen: Well developed; well nourished; no acute distress; age appropriate " appearance   HEENT: Normocephalic; atraumatic; PEERLA b/l; sclera clear b/l; b/l external auditory canals WNL; b/l TM WNL; nares patent; oropharynx clear; oral mucosa moist; tongue midline; dentition adequate after mask removed; both ears are same size and no localized edema present   Neck: No adenopathy; no thyromegaly  CV: Regular rate and rhythm; S1/ S2 present; no murmur, gallop or rub noted  Pulm: No respiratory distress; clear to ascultation b/l; no wheezing or stridor noted b/l  Extremities: No peripheral edema b/l LE extremities/ no clubbing nor cyanosis noted  Skin: Warm and dry; no rashes noted   Neuro: No focal deficits noted   Psych: AAOx4; mood and affect are appropriate    A/P:  1. Right ear pain  New condition for patient as described above in HPI. Exam and vital signs are WNL today and recommend patient use Flonase BID consistently as well as PRN Tylenol for symptom management. If condition does not resolve with treatment plans detailed above, patient will contact me for further management.     2. Health care maintenance  Patient reminded to make fasting lab appointment for annual labs in near future for ongoing medical management.

## 2022-09-29 ENCOUNTER — NON-PROVIDER VISIT (OUTPATIENT)
Dept: INTERNAL MEDICINE | Facility: IMAGING CENTER | Age: 67
End: 2022-09-29
Payer: MEDICARE

## 2022-09-29 DIAGNOSIS — Z23 NEED FOR INFLUENZA VACCINATION: ICD-10-CM

## 2022-09-29 PROCEDURE — G0008 ADMIN INFLUENZA VIRUS VAC: HCPCS | Performed by: FAMILY MEDICINE

## 2022-09-29 PROCEDURE — 90662 IIV NO PRSV INCREASED AG IM: CPT | Performed by: FAMILY MEDICINE

## 2022-10-19 ENCOUNTER — NON-PROVIDER VISIT (OUTPATIENT)
Dept: INTERNAL MEDICINE | Facility: IMAGING CENTER | Age: 67
End: 2022-10-19
Payer: MEDICARE

## 2022-10-19 ENCOUNTER — HOSPITAL ENCOUNTER (OUTPATIENT)
Facility: MEDICAL CENTER | Age: 67
End: 2022-10-19
Attending: FAMILY MEDICINE
Payer: MEDICARE

## 2022-10-19 ENCOUNTER — HOSPITAL ENCOUNTER (OUTPATIENT)
Facility: MEDICAL CENTER | Age: 67
End: 2022-10-19
Attending: INTERNAL MEDICINE
Payer: MEDICARE

## 2022-10-19 DIAGNOSIS — R73.9 HYPERGLYCEMIA: ICD-10-CM

## 2022-10-19 DIAGNOSIS — E55.9 VITAMIN D DEFICIENCY: Chronic | ICD-10-CM

## 2022-10-19 DIAGNOSIS — R53.83 OTHER FATIGUE: ICD-10-CM

## 2022-10-19 DIAGNOSIS — E78.2 MIXED DYSLIPIDEMIA: Chronic | ICD-10-CM

## 2022-10-19 DIAGNOSIS — Z00.00 HEALTH CARE MAINTENANCE: ICD-10-CM

## 2022-10-19 LAB
25(OH)D3 SERPL-MCNC: 55 NG/ML (ref 30–100)
ALBUMIN SERPL BCP-MCNC: 4.9 G/DL (ref 3.2–4.9)
ALBUMIN/GLOB SERPL: 1.6 G/DL
ALP SERPL-CCNC: 81 U/L (ref 30–99)
ALT SERPL-CCNC: 19 U/L (ref 2–50)
ANION GAP SERPL CALC-SCNC: 11 MMOL/L (ref 7–16)
AST SERPL-CCNC: 17 U/L (ref 12–45)
BASOPHILS # BLD AUTO: 1.4 % (ref 0–1.8)
BASOPHILS # BLD: 0.06 K/UL (ref 0–0.12)
BILIRUB SERPL-MCNC: 0.4 MG/DL (ref 0.1–1.5)
BUN SERPL-MCNC: 14 MG/DL (ref 8–22)
CALCIUM SERPL-MCNC: 9.3 MG/DL (ref 8.5–10.5)
CHLORIDE SERPL-SCNC: 102 MMOL/L (ref 96–112)
CHOLEST SERPL-MCNC: 301 MG/DL (ref 100–199)
CO2 SERPL-SCNC: 25 MMOL/L (ref 20–33)
CREAT SERPL-MCNC: 0.53 MG/DL (ref 0.5–1.4)
EOSINOPHIL # BLD AUTO: 0.19 K/UL (ref 0–0.51)
EOSINOPHIL NFR BLD: 4.5 % (ref 0–6.9)
ERYTHROCYTE [DISTWIDTH] IN BLOOD BY AUTOMATED COUNT: 42.1 FL (ref 35.9–50)
EST. AVERAGE GLUCOSE BLD GHB EST-MCNC: 117 MG/DL
GFR SERPLBLD CREATININE-BSD FMLA CKD-EPI: 102 ML/MIN/1.73 M 2
GLOBULIN SER CALC-MCNC: 3 G/DL (ref 1.9–3.5)
GLUCOSE SERPL-MCNC: 96 MG/DL (ref 65–99)
HBA1C MFR BLD: 5.7 % (ref 4–5.6)
HCT VFR BLD AUTO: 43 % (ref 37–47)
HDLC SERPL-MCNC: 62 MG/DL
HGB BLD-MCNC: 14 G/DL (ref 12–16)
IMM GRANULOCYTES # BLD AUTO: 0.01 K/UL (ref 0–0.11)
IMM GRANULOCYTES NFR BLD AUTO: 0.2 % (ref 0–0.9)
LDLC SERPL CALC-MCNC: 204 MG/DL
LYMPHOCYTES # BLD AUTO: 1.34 K/UL (ref 1–4.8)
LYMPHOCYTES NFR BLD: 31.8 % (ref 22–41)
MCH RBC QN AUTO: 29.6 PG (ref 27–33)
MCHC RBC AUTO-ENTMCNC: 32.6 G/DL (ref 33.6–35)
MCV RBC AUTO: 90.9 FL (ref 81.4–97.8)
MONOCYTES # BLD AUTO: 0.28 K/UL (ref 0–0.85)
MONOCYTES NFR BLD AUTO: 6.7 % (ref 0–13.4)
NEUTROPHILS # BLD AUTO: 2.33 K/UL (ref 2–7.15)
NEUTROPHILS NFR BLD: 55.4 % (ref 44–72)
NRBC # BLD AUTO: 0 K/UL
NRBC BLD-RTO: 0 /100 WBC
PLATELET # BLD AUTO: 311 K/UL (ref 164–446)
PMV BLD AUTO: 10 FL (ref 9–12.9)
POTASSIUM SERPL-SCNC: 4.3 MMOL/L (ref 3.6–5.5)
PROT SERPL-MCNC: 7.9 G/DL (ref 6–8.2)
RBC # BLD AUTO: 4.73 M/UL (ref 4.2–5.4)
SODIUM SERPL-SCNC: 138 MMOL/L (ref 135–145)
T4 FREE SERPL-MCNC: 0.96 NG/DL (ref 0.93–1.7)
TRIGL SERPL-MCNC: 175 MG/DL (ref 0–149)
TSH SERPL DL<=0.005 MIU/L-ACNC: 0.26 UIU/ML (ref 0.38–5.33)
WBC # BLD AUTO: 4.2 K/UL (ref 4.8–10.8)

## 2022-10-19 PROCEDURE — 85025 COMPLETE CBC W/AUTO DIFF WBC: CPT

## 2022-10-19 PROCEDURE — 83036 HEMOGLOBIN GLYCOSYLATED A1C: CPT

## 2022-10-19 PROCEDURE — 80061 LIPID PANEL: CPT

## 2022-10-19 PROCEDURE — 82306 VITAMIN D 25 HYDROXY: CPT

## 2022-10-19 PROCEDURE — 80053 COMPREHEN METABOLIC PANEL: CPT

## 2022-10-19 PROCEDURE — 84439 ASSAY OF FREE THYROXINE: CPT

## 2022-10-19 PROCEDURE — 84443 ASSAY THYROID STIM HORMONE: CPT

## 2022-10-31 ENCOUNTER — OFFICE VISIT (OUTPATIENT)
Dept: INTERNAL MEDICINE | Facility: IMAGING CENTER | Age: 67
End: 2022-10-31
Payer: MEDICARE

## 2022-10-31 VITALS
DIASTOLIC BLOOD PRESSURE: 70 MMHG | WEIGHT: 100.09 LBS | BODY MASS INDEX: 18.42 KG/M2 | OXYGEN SATURATION: 92 % | HEART RATE: 101 BPM | TEMPERATURE: 98.7 F | HEIGHT: 62 IN | RESPIRATION RATE: 17 BRPM | SYSTOLIC BLOOD PRESSURE: 122 MMHG

## 2022-10-31 DIAGNOSIS — Z12.12 SCREENING FOR COLORECTAL CANCER: ICD-10-CM

## 2022-10-31 DIAGNOSIS — Z23 NEED FOR VACCINATION: ICD-10-CM

## 2022-10-31 DIAGNOSIS — Z78.9 STATIN INTOLERANCE: ICD-10-CM

## 2022-10-31 DIAGNOSIS — E78.2 MIXED DYSLIPIDEMIA: ICD-10-CM

## 2022-10-31 DIAGNOSIS — F51.01 PRIMARY INSOMNIA: ICD-10-CM

## 2022-10-31 DIAGNOSIS — R21 SKIN RASH: ICD-10-CM

## 2022-10-31 DIAGNOSIS — Z12.11 SCREENING FOR COLORECTAL CANCER: ICD-10-CM

## 2022-10-31 PROCEDURE — 99214 OFFICE O/P EST MOD 30 MIN: CPT | Mod: 25 | Performed by: FAMILY MEDICINE

## 2022-10-31 PROCEDURE — G0009 ADMIN PNEUMOCOCCAL VACCINE: HCPCS | Performed by: FAMILY MEDICINE

## 2022-10-31 PROCEDURE — 90677 PCV20 VACCINE IM: CPT | Performed by: FAMILY MEDICINE

## 2022-10-31 RX ORDER — METHYLPREDNISOLONE 4 MG/1
TABLET ORAL
Qty: 21 TABLET | Refills: 0 | Status: SHIPPED
Start: 2022-10-31 | End: 2023-02-01

## 2022-10-31 RX ORDER — HYDROXYZINE HYDROCHLORIDE 25 MG/1
25 TABLET, FILM COATED ORAL 3 TIMES DAILY PRN
Qty: 30 TABLET | Refills: 0 | Status: SHIPPED | OUTPATIENT
Start: 2022-10-31 | End: 2024-02-09

## 2022-10-31 RX ORDER — ZOLPIDEM TARTRATE 10 MG/1
10 TABLET ORAL NIGHTLY PRN
Qty: 30 TABLET | Refills: 2 | Status: SHIPPED | OUTPATIENT
Start: 2022-10-31 | End: 2022-11-30

## 2022-10-31 ASSESSMENT — FIBROSIS 4 INDEX: FIB4 SCORE: 0.82

## 2022-10-31 NOTE — PROGRESS NOTES
"Chief Complaint   Patient presents with    Rash     Trunk rash x1 week. Has tired cortisone and benadryl with no relief.        HPI:  Patient is a 67 y.o. female established patient who presents today for evaluation of new skin rash that appeared approximately one week ago without known trigger. She reports having a very fine red rash on trunk and bilateral flank areas without associated prodromal illness, no new exposures, no new soaps/skin products, and no recent travel. She has been applying hydrocortisone cream as well as taking oral benadryl without itch relief.     Patient Active Problem List    Diagnosis Date Noted    Body mass index (BMI) 19.9 or less, adult 06/29/2018    Mixed dyslipidemia 02/12/2018    Post menopausal syndrome 02/12/2018    Vitamin D deficiency 02/12/2018    Chronic right hip pain 05/21/2017    History of breast cancer 05/08/2017    Chronic narcotic dependence (HCC) 05/05/2017    Diverticulosis of large intestine without hemorrhage, per 12/23/16 COLONOSCOPY 01/14/2017    Adenomatous polyp of colon, per 12/23/16 COLONOSCOPY 01/14/2017    Atherosclerosis of right carotid artery, mild 12/15/2016    History of opthalmic migraine 07/12/2016    Frozen shoulder 03/08/2016    Irritable bowel syndrome (IBS) 01/19/2016    Family history of colon cancer     Chronic pain 08/13/2013    Primary insomnia     Chronic pelvic pain in female        Past medical, surgical, family, and social history was reviewed and updated in Epic chart by me today.     Medications and allergies reviewed and updated in Epic chart by me today.     Lab results 10/19/22 reviewed with patient at visit today.     ROS:  Pertinent positives listed above in HPI. All other systems have been reviewed and are negative.    PE:   /70 (BP Location: Left arm, Patient Position: Sitting, BP Cuff Size: Adult)   Pulse (!) 101   Temp 37.1 °C (98.7 °F) (Temporal)   Resp 17   Ht 1.575 m (5' 2\")   Wt 45.4 kg (100 lb 1.4 oz)   SpO2 92%  "  BMI 18.31 kg/m²   Vital signs reviewed with patient.     Gen: Well developed; well nourished; no acute distress; non toxic appearance   CV: Regular rate and rhythm; S1/ S2 present; no murmur, gallop or rub noted  Pulm: No respiratory distress; clear to ascultation b/l; no wheezing or stridor noted b/l   Extremities: No peripheral edema b/l LE extremities/ no clubbing nor cyanosis noted  Skin: Warm and dry; very fine red rash on on trunk and b/l flank without associated skin scabbing/ no drainage/ no signs of secondary infection  Neuro: No focal deficits noted   Psych: AAOx4; mood and affect are appropriate    A/P:  1. Skin rash  New rash present for the past 1 week without known etiology. Patient denies new recent triggers nor new exposures and has tried topical hydrocortisone cream as well as oral benadryl without itch relief. Case discussed at visit and recommend patient start Medrol dosepack today and use Atarax PRN for itch management (recommend first dose at home without driving due to possible drowsiness). New RX sent to pharmacy and will follow.   - methylPREDNISolone (MEDROL DOSEPAK) 4 MG Tablet Therapy Pack; As directed on the packaging label.  Dispense: 21 Tablet; Refill: 0  - hydrOXYzine HCl (ATARAX) 25 MG Tab; Take 1 Tablet by mouth 3 times a day as needed for Itching.  Dispense: 30 Tablet; Refill: 0    2. Need for vaccination  Vaccine administered at visit today.  - Pneumococcal Conjugate Vaccine 20-Valent (19 yrs+)    3. Primary insomnia  Stable/ patient can continue infrequent Ambien use for symptom management.  reviewed today and no concerns noted. Pt is well versed in safe use of controlled medication, and benefit of use outweighs risk at this time. New RX sent to pharmacy.   - zolpidem (AMBIEN) 10 MG Tab; Take 1 Tablet by mouth at bedtime as needed for Sleep for up to 30 days.  Dispense: 30 Tablet; Refill: 2    4. Screening for colorectal cancer  Patient is overdue for screening colonoscopy,  and new referral made to [facility].   - Referral to GI for Colonoscopy    5. Mixed dyslipidemia/ Statin intolerance  Uncontrolled condition for patient with history of statin intolerance. Case discussed at visit today, and I recommend patient follow up with Dr. Bloch for additional treatment options/management. New referral made to Dr. Bloch.   - Referral to Lipid Clinic

## 2022-11-02 ENCOUNTER — TELEPHONE (OUTPATIENT)
Dept: VASCULAR LAB | Facility: MEDICAL CENTER | Age: 67
End: 2022-11-02
Payer: MEDICARE

## 2022-11-02 NOTE — TELEPHONE ENCOUNTER
[facility]    Received lipid referral from Dr. Danielle Blas M.D. on 11/1    Scheduled NP 1/9 as pt will be leaving out of the country tomorrow until January 2023    Insurance: Medicare  PCP: [facility]  Locations to be seen: Any    [facility] at [phone], fax [phone]    Alexandra Vogel, AlD

## 2022-11-09 ENCOUNTER — PATIENT MESSAGE (OUTPATIENT)
Dept: HEALTH INFORMATION MANAGEMENT | Facility: OTHER | Age: 67
End: 2022-11-09

## 2023-01-08 NOTE — PROGRESS NOTES
"[facility] - Initial Visit  Date of Service: 01/09/23    Adrianne Martinez has been referred for evaluation and management of dyslipidemia    Referral Source: Danielle Blas MD    Subjective    HPI  History of ASCVD: Yes, Details: Atherosclerosis of right carotid artery 2016 ; however patient received a CT-cardiac (results not seen in chart) and PCP reported \"CT cardiac imaging score is zero,\" per 1/22/21 communication.  Other Established (non-atherosclerotic) Vascular Disease, if Present: None   Age at Initial Diagnosis of Dyslipidemia: Around 58yo    Current Prescription Lipid Lowering Medications - including dose:   Statin: None  Non-Statin: None  Current Lipid Lowering and Related Supplements:   Coenzyme Q10, Omega-3 1000 mg qAM  Any Current Side Effects Potentially Related to Lipid Lowering therapy?   No  Current Adherence to Lipid Lowering Therapies 124/90, HR 93  Complete  Previously Attempted Interventions for Lipids - including outcome  Statin: Lipitor and one other statin, unable to remember name   Outcome:  statin intolerance  Non-Statin: Vitamin D   Outcome:  Levels too high, stopped by oncologist  Any Previous History of Statin Intolerance?   Yes, Details: Lipitor, unremitting muscle pain   Baseline Lipids Prior to Treatment:    Latest Reference Range & Units 10/19/22 09:50   Cholesterol,Tot 100 - 199 mg/dL 301 (H)   Triglycerides 0 - 149 mg/dL 175 (H)   HDL >=40 mg/dL 62   LDL <100 mg/dL 204 (H)     Other Pertinent History: Breast cancer, fibromyalgia, denies other cardiac hx  History of other CV risk factors: Family hx see below; /90 mmHg in clinic today    PAST MEDICAL HISTORY:  has a past medical history of Anesthesia, Bowel habit changes, Breast cancer (HCC), Cancer (HCC) (2017), Cancer (HCC), Cataract, Chronic pain, Chronic pelvic pain in female, Colon polyp, ENDOMETRIOSIS, Endometriosis, Family history of colon cancer, Gynecological disorder, Heart burn, Hepatitis, " Hyperlipidemia, Indigestion, Insomnia, Jaundice ( ), Pain, Pain, Pneumonia (), Postmenopausal HRT (hormone replacement therapy), Psychiatric problem, Recurrent UTI, Seizure (HCC), Snoring, and Urine frequency.    PAST SURGICAL HISTORY:  has a past surgical history that includes lumpectomy (); other orthopedic surgery (); breast biopsy; breast biopsy; laparoscopy (4578-6072); cataract extraction with iol (Bilateral); abdominal hysterectomy total (); ovarian cystectomy (); mastectomy (Left, 2017); node biopsy sentinel (2017); breast biopsy (Left, 2017); and lumpectomy (Left, 2017).    CURRENT MEDICATIONS:   Current Outpatient Medications:     methylPREDNISolone, As directed on the packaging label.    hydrOXYzine HCl, 25 mg, Oral, TID PRN    CALCIUM PO, 300 mg, Oral, QAM    B-12, 5,000 mcg, Oral, QAM    NALTREXONE HCL PO, 3 mg, Oral, QHS    multivitamin, 1 Tablet, Oral, QAM    MELATONIN PO, 6 mg, Oral, QHS    DULoxetine, 60 mg, Oral, QHS    estradiol, 1 Applicatorful, Vaginal, PRN    Thyroid, 48.75 mg, Oral, QAM    Vitamin D3, 2,000 Units, Oral, QAM    TURMERIC PO, 1,000 mg, Oral, QAM    CoQ10, 100 mg, Oral, QAM    pregabalin, 50-75 mg, Oral, QHS PRN    Vitamin C, 1,000 mg, Oral, QAM    fish oil, 1,000 mg, Oral, QAM    tizanidine, 4-8 mg, Oral, QHS PRN    ALLERGIES: Meperidine, Oxycontin [oxycodone hcl], Statins [hmg-coa-r inhibitors], Cherry, Latex, and Nsaids    FAMILY HISTORY: Mother has history of hypercholesterinemia, brother  of cardiovascular process, mother side of family has lots of cardiac history    SOCIAL HISTORY   Social History     Tobacco Use   Smoking Status Never   Smokeless Tobacco Never     Change in weight: Stable  Exercise habits: moderate regular exercise program  Diet: common adult        Objective      There were no vitals filed for this visit.   Physical Exam    DATA REVIEW:  Most Recent Lipid Panel:   Lab Results   Component Value Date     CHOLSTRLTOT 301 (H) 10/19/2022    TRIGLYCERIDE 175 (H) 10/19/2022    HDL 62 10/19/2022     (H) 10/19/2022       Other Pertinent Blood Work:   Lab Results   Component Value Date    SODIUM 138 10/19/2022    POTASSIUM 4.3 10/19/2022    CHLORIDE 102 10/19/2022    CO2 25 10/19/2022    ANION 11.0 10/19/2022    GLUCOSE 96 10/19/2022    BUN 14 10/19/2022    CREATININE 0.53 10/19/2022    CALCIUM 9.3 10/19/2022    ASTSGOT 17 10/19/2022    ALTSGPT 19 10/19/2022    ALKPHOSPHAT 81 10/19/2022    TBILIRUBIN 0.4 10/19/2022    ALBUMIN 4.9 10/19/2022    AGRATIO 1.6 10/19/2022    CREACTPROT 1.57 (H) 2021    TSHULTRASEN 0.260 (L) 10/19/2022       Other: NA    Recent Imaging Studies:    None since last visit        ASSESSMENT AND PLAN  Patient Type, check all that apply:   Primary Prevention  Established Atherosclerotic Cardiovascular Disease (ASCVD)  No, plaque of right ICA on 2016 vascular screening, but reported negative coronary calcium score in .   Other Established (non-atherosclerotic) Vascular Disease, if Present:  None  Evidence of Heterozygous Familial Hypercholesterolemia (FH):   Likely, patient's mother has had early age diagnosis of hypercholesterinemia, patient's brother  early from cardiac event, and large maternal family cardiac history. Patient has had LDL >190 approximately 5 years per chart review.    ACC/AHA Indication for Statin Therapy, gold all that apply:  LDL-C at baseline >190 mg/dl: Indication for High intensity statin   Calculated Risk for ASCVD, if applicable    6.9 %  Other Significant Risk Markers, if any, gold all that apply   Family history of premature ASCVD in first degree relative  Goal LDL-C and nonHDL-C based on Clinic Protocol  LDL-C:   <100 mg/dL  Lifestyle Recommendations From Today’s Visit:    Eating Plan: Concentrate on  Low sat/Trans fat  Exercise: Continue moderate exercise as able  Statin Therapy Recommendations from Today’s Visit:   None, statin intolerance, not willing  to re-trial today  Non-Statin Medications Recommendations from Today’s Visit:   Start Praluent 75 mg SC q14 days  Indication for PCSK9 Inhibitor, if applicable:  FH with suboptimal control of LDL-C despite maximally tolerated statin  Supplements Recommended at this visit:   Continue CoQ10 and fish oil supplements   Recommendations for Other Cardiovascular Risk Factors, gold all that apply:   None at this time    Studies Ordered at Todays Visit: None  Blood Work Ordered At Today’s visit: Lipids, CMP   Follow-Up: 3 weeks    Samples provided today:  2 x Praluent 75 mg injections  LOT [id number]  EXP 05/31/2023  Counseled on administration, AE, monitoring, and cost.     Betty Resendiz, PharmD    CC:  Tiffany R Barnett, M.D. Michael Bloch, MD Jason Dent, PharmD

## 2023-01-09 ENCOUNTER — NON-PROVIDER VISIT (OUTPATIENT)
Dept: VASCULAR LAB | Facility: MEDICAL CENTER | Age: 68
End: 2023-01-09
Attending: INTERNAL MEDICINE
Payer: MEDICARE

## 2023-01-09 DIAGNOSIS — E78.2 MIXED DYSLIPIDEMIA: Chronic | ICD-10-CM

## 2023-01-09 PROCEDURE — 99212 OFFICE O/P EST SF 10 MIN: CPT

## 2023-01-09 RX ORDER — ALIROCUMAB 75 MG/ML
75 INJECTION, SOLUTION SUBCUTANEOUS
Qty: 2 ML | Refills: 3 | Status: SHIPPED
Start: 2023-01-09 | End: 2023-08-18

## 2023-01-09 NOTE — Clinical Note
Initial lipid apt. Patient has a history of hyperlipidemia >10 years, with LDL >190 for at least 5 years. Not currently on any anti-lipid medications. Patient likely has familial hypercholesterolemia based on family history of mother have hypercholesteremia, brother dying from cardiovascular event early in life, and large maternal family cardiac history. Patient has reported intolerance to statins, trying them many years ago. Cannot recall all statins she has tried but reports that she had to stop due to adverse effects. Not willing to re-trial today. Determined we will start Praluent 75 mg SC q14 days, provided 2 doses of samples and counseled in clinic on how to administer, AE, monitoring, and likely cost. Will follow up within in the month as patient is going out of town in February.

## 2023-01-27 ENCOUNTER — HOSPITAL ENCOUNTER (OUTPATIENT)
Facility: MEDICAL CENTER | Age: 68
End: 2023-01-27
Attending: INTERNAL MEDICINE
Payer: MEDICARE

## 2023-01-27 ENCOUNTER — NON-PROVIDER VISIT (OUTPATIENT)
Dept: INTERNAL MEDICINE | Facility: IMAGING CENTER | Age: 68
End: 2023-01-27
Payer: MEDICARE

## 2023-01-27 DIAGNOSIS — E78.2 MIXED DYSLIPIDEMIA: Chronic | ICD-10-CM

## 2023-01-27 PROCEDURE — 85025 COMPLETE CBC W/AUTO DIFF WBC: CPT

## 2023-01-27 PROCEDURE — 80053 COMPREHEN METABOLIC PANEL: CPT

## 2023-01-28 LAB
ALBUMIN SERPL BCP-MCNC: 5 G/DL (ref 3.2–4.9)
ALBUMIN/GLOB SERPL: 1.8 G/DL
ALP SERPL-CCNC: 107 U/L (ref 30–99)
ALT SERPL-CCNC: 19 U/L (ref 2–50)
ANION GAP SERPL CALC-SCNC: 14 MMOL/L (ref 7–16)
AST SERPL-CCNC: 27 U/L (ref 12–45)
BASOPHILS # BLD AUTO: 1.2 % (ref 0–1.8)
BASOPHILS # BLD: 0.08 K/UL (ref 0–0.12)
BILIRUB SERPL-MCNC: <0.2 MG/DL (ref 0.1–1.5)
BUN SERPL-MCNC: 12 MG/DL (ref 8–22)
CALCIUM ALBUM COR SERPL-MCNC: 9.4 MG/DL (ref 8.5–10.5)
CALCIUM SERPL-MCNC: 10.2 MG/DL (ref 8.5–10.5)
CHLORIDE SERPL-SCNC: 100 MMOL/L (ref 96–112)
CO2 SERPL-SCNC: 25 MMOL/L (ref 20–33)
CREAT SERPL-MCNC: 0.67 MG/DL (ref 0.5–1.4)
EOSINOPHIL # BLD AUTO: 0.27 K/UL (ref 0–0.51)
EOSINOPHIL NFR BLD: 3.9 % (ref 0–6.9)
ERYTHROCYTE [DISTWIDTH] IN BLOOD BY AUTOMATED COUNT: 44.4 FL (ref 35.9–50)
GFR SERPLBLD CREATININE-BSD FMLA CKD-EPI: 97 ML/MIN/1.73 M 2
GLOBULIN SER CALC-MCNC: 2.8 G/DL (ref 1.9–3.5)
GLUCOSE SERPL-MCNC: 128 MG/DL (ref 65–99)
HCT VFR BLD AUTO: 43.1 % (ref 37–47)
HGB BLD-MCNC: 13.9 G/DL (ref 12–16)
IMM GRANULOCYTES # BLD AUTO: 0.02 K/UL (ref 0–0.11)
IMM GRANULOCYTES NFR BLD AUTO: 0.3 % (ref 0–0.9)
LYMPHOCYTES # BLD AUTO: 1.55 K/UL (ref 1–4.8)
LYMPHOCYTES NFR BLD: 22.3 % (ref 22–41)
MCH RBC QN AUTO: 30 PG (ref 27–33)
MCHC RBC AUTO-ENTMCNC: 32.3 G/DL (ref 33.6–35)
MCV RBC AUTO: 92.9 FL (ref 81.4–97.8)
MONOCYTES # BLD AUTO: 0.41 K/UL (ref 0–0.85)
MONOCYTES NFR BLD AUTO: 5.9 % (ref 0–13.4)
NEUTROPHILS # BLD AUTO: 4.62 K/UL (ref 2–7.15)
NEUTROPHILS NFR BLD: 66.4 % (ref 44–72)
NRBC # BLD AUTO: 0 K/UL
NRBC BLD-RTO: 0 /100 WBC
PLATELET # BLD AUTO: 312 K/UL (ref 164–446)
PMV BLD AUTO: 10.3 FL (ref 9–12.9)
POTASSIUM SERPL-SCNC: 4.7 MMOL/L (ref 3.6–5.5)
PROT SERPL-MCNC: 7.8 G/DL (ref 6–8.2)
RBC # BLD AUTO: 4.64 M/UL (ref 4.2–5.4)
SODIUM SERPL-SCNC: 139 MMOL/L (ref 135–145)
WBC # BLD AUTO: 7 K/UL (ref 4.8–10.8)

## 2023-01-30 ENCOUNTER — NON-PROVIDER VISIT (OUTPATIENT)
Dept: VASCULAR LAB | Facility: MEDICAL CENTER | Age: 68
End: 2023-01-30
Attending: INTERNAL MEDICINE
Payer: MEDICARE

## 2023-01-30 DIAGNOSIS — E78.2 MIXED DYSLIPIDEMIA: ICD-10-CM

## 2023-01-30 PROCEDURE — 99212 OFFICE O/P EST SF 10 MIN: CPT

## 2023-01-30 NOTE — PROGRESS NOTES
[facility] - FollowUp Visit  Date of Service: 01/30/23    Adrianne Martinez is here for follow up of dyslipidemia.    Subjective    HPI  Pertinent Interval History since last visit:   Patient was rx'd Praluent 75 mg last visit. Patient left last visit with samples and had no problem taking or any injection site reactions. Unfortunately, her prescription will not be ready until today after the visit, so patient was been unable to assess the cost. CMP was drawn last Friday and is within normal limits. Lipid panel not drawn.  Current Prescription Lipid Lowering Medications - including dose:   Statin: None  Non-Statin: Praluent 75 mg q14 days  Current Lipid Lowering and Related Supplements:   Coenzyme Q10, Omega-3 1000 mg qAM  Any Current Side Effects Potentially Related to Lipid Lowering therapy?   No  Current Adherence to Lipid Lowering Therapies:  Complete  Any Previous History of Statin Intolerance?   Yes, Details: Lipitor causing severe myalgias   Baseline Lipids Prior to Treatment:   Latest Reference Range & Units 10/19/22 09:50   Cholesterol,Tot 100 - 199 mg/dL 301 (H)   Triglycerides 0 - 149 mg/dL 175 (H)   HDL >=40 mg/dL 62   LDL <100 mg/dL 204 (H)       SOCIAL HISTORY  Social History     Tobacco Use   Smoking Status Never   Smokeless Tobacco Never      Change in weight: Stable  Exercise habits: moderate regular exercise program   Diet: common adult      Objective    There were no vitals filed for this visit.   Physical Exam    DATA REVIEW  Most Recent Lipid Panel:   Lab Results   Component Value Date    CHOLSTRLTOT 301 (H) 10/19/2022    TRIGLYCERIDE 175 (H) 10/19/2022    HDL 62 10/19/2022     (H) 10/19/2022       Other Pertinent Blood Work:   Lab Results   Component Value Date    SODIUM 139 01/27/2023    POTASSIUM 4.7 01/27/2023    CHLORIDE 100 01/27/2023    CO2 25 01/27/2023    ANION 14.0 01/27/2023    GLUCOSE 128 (H) 01/27/2023    BUN 12 01/27/2023    CREATININE 0.67 01/27/2023    CALCIUM  10.2 2023    ASTSGOT 27 2023    ALTSGPT 19 2023    ALKPHOSPHAT 107 (H) 2023    TBILIRUBIN <0.2 2023    ALBUMIN 5.0 (H) 2023    AGRATIO 1.8 2023    CREACTPROT 1.57 (H) 2021    TSHULTRASEN 0.260 (L) 10/19/2022       Other:  NA    Recent Imaging Studies:    None since last visit        ASSESSMENT AND PLAN  Patient Type, check all that apply:   Primary Prevention  Established Atherosclerotic Cardiovascular Disease (ASCVD)  No, plaque of right ICA on  vascular screening, but reported negative coronary calcium score in .  Other Established (non-atherosclerotic) Vascular Disease, if Present:    None  Evidence of Heterozygous Familial Hypercholesterolemia (FH):   Likely, patient's mother has had early age diagnosis of hypercholesterinemia, patient's brother  early from cardiac event, and large maternal family cardiac history. Patient has had LDL >190 approximately 5 years per chart review.    ACC/AHA Indication for Statin Therapy, gold all that apply:   LDL-C at baseline >190 mg/dl: Indication for High intensity statin    Calculated Risk for ASCVD, if applicable:  6.9 %  Other Significant Risk Markers, if any, gold all that apply:  Family history of premature ASCVD in first degree relative  National Lipid Association (NLA) Goal (if applicable):  LDL-C:   <100 mg/dL  Lifestyle Recommendations From Today’s Visit:   Eating Plan: Concentrate on  Low sat/Trans fat  Exercise: Continue moderate exercise as able  Statin Recommendations from Today's Visit  None, statin intolerance, not willing to re-trial today  Non-Statin Medications Recommendations from Today’s Visit:   Continue Praluent 75 mg q14 days  Indication for PCSK9 Inhibitor, if applicable:  FH with suboptimal control of LDL-C despite maximally tolerated statin  Supplements Recommended at this visit:  Continue CoQ10 and fish oil supplements   Recommendations for Other Cardiovascular Risk Factors, gold all that  apply:   N/a  Other Issues:  N/a    Studies Ordered at Todays Visit:  None  Blood Work Ordered At Today’s visit:   CMP, Lipid panel, requisitions printed  Follow-Up:   2 months    Betty Resendiz, PharmD    CC:  Tiffany R Barnett, M.D. Michael Bloch, MD Jason Dent, AlD

## 2023-02-01 ENCOUNTER — APPOINTMENT (OUTPATIENT)
Dept: RADIOLOGY | Facility: MEDICAL CENTER | Age: 68
End: 2023-02-01
Attending: EMERGENCY MEDICINE
Payer: MEDICARE

## 2023-02-01 ENCOUNTER — HOSPITAL ENCOUNTER (EMERGENCY)
Facility: MEDICAL CENTER | Age: 68
End: 2023-02-01
Attending: EMERGENCY MEDICINE
Payer: MEDICARE

## 2023-02-01 VITALS
TEMPERATURE: 97.7 F | SYSTOLIC BLOOD PRESSURE: 116 MMHG | DIASTOLIC BLOOD PRESSURE: 59 MMHG | HEART RATE: 79 BPM | BODY MASS INDEX: 18.4 KG/M2 | OXYGEN SATURATION: 98 % | RESPIRATION RATE: 20 BRPM | WEIGHT: 100 LBS | HEIGHT: 62 IN

## 2023-02-01 DIAGNOSIS — I77.1 CELIAC ARTERY STENOSIS (HCC): ICD-10-CM

## 2023-02-01 DIAGNOSIS — N39.0 ACUTE UTI: ICD-10-CM

## 2023-02-01 DIAGNOSIS — R10.9 ACUTE ABDOMINAL PAIN: ICD-10-CM

## 2023-02-01 DIAGNOSIS — K55.1 SUPERIOR MESENTERIC ARTERY STENOSIS (HCC): ICD-10-CM

## 2023-02-01 LAB
ALBUMIN SERPL BCP-MCNC: 4 G/DL (ref 3.2–4.9)
ALBUMIN/GLOB SERPL: 1.7 G/DL
ALP SERPL-CCNC: 67 U/L (ref 30–99)
ALT SERPL-CCNC: 15 U/L (ref 2–50)
ANION GAP SERPL CALC-SCNC: 13 MMOL/L (ref 7–16)
APPEARANCE UR: CLEAR
AST SERPL-CCNC: 21 U/L (ref 12–45)
BACTERIA #/AREA URNS HPF: ABNORMAL /HPF
BASOPHILS # BLD AUTO: 0.7 % (ref 0–1.8)
BASOPHILS # BLD AUTO: 1.2 % (ref 0–1.8)
BASOPHILS # BLD: 0.09 K/UL (ref 0–0.12)
BASOPHILS # BLD: 0.09 K/UL (ref 0–0.12)
BILIRUB SERPL-MCNC: <0.2 MG/DL (ref 0.1–1.5)
BILIRUB UR QL STRIP.AUTO: NEGATIVE
BUN SERPL-MCNC: 17 MG/DL (ref 8–22)
CALCIUM ALBUM COR SERPL-MCNC: 8.2 MG/DL (ref 8.5–10.5)
CALCIUM SERPL-MCNC: 8.2 MG/DL (ref 8.5–10.5)
CHLORIDE SERPL-SCNC: 108 MMOL/L (ref 96–112)
CO2 SERPL-SCNC: 19 MMOL/L (ref 20–33)
COLOR UR: YELLOW
CREAT SERPL-MCNC: 0.42 MG/DL (ref 0.5–1.4)
CRP SERPL HS-MCNC: 0.7 MG/L (ref 0–3)
EKG IMPRESSION: NORMAL
EOSINOPHIL # BLD AUTO: 0.04 K/UL (ref 0–0.51)
EOSINOPHIL # BLD AUTO: 0.19 K/UL (ref 0–0.51)
EOSINOPHIL NFR BLD: 0.3 % (ref 0–6.9)
EOSINOPHIL NFR BLD: 2.5 % (ref 0–6.9)
EPI CELLS #/AREA URNS HPF: ABNORMAL /HPF
ERYTHROCYTE [DISTWIDTH] IN BLOOD BY AUTOMATED COUNT: 43.9 FL (ref 35.9–50)
ERYTHROCYTE [DISTWIDTH] IN BLOOD BY AUTOMATED COUNT: 44.3 FL (ref 35.9–50)
ERYTHROCYTE [SEDIMENTATION RATE] IN BLOOD BY WESTERGREN METHOD: 17 MM/HOUR (ref 0–25)
GFR SERPLBLD CREATININE-BSD FMLA CKD-EPI: 108 ML/MIN/1.73 M 2
GLOBULIN SER CALC-MCNC: 2.3 G/DL (ref 1.9–3.5)
GLUCOSE SERPL-MCNC: 124 MG/DL (ref 65–99)
GLUCOSE UR STRIP.AUTO-MCNC: NEGATIVE MG/DL
HCT VFR BLD AUTO: 33.4 % (ref 37–47)
HCT VFR BLD AUTO: 37.6 % (ref 37–47)
HGB BLD-MCNC: 10.7 G/DL (ref 12–16)
HGB BLD-MCNC: 12.3 G/DL (ref 12–16)
HYALINE CASTS #/AREA URNS LPF: ABNORMAL /LPF
IMM GRANULOCYTES # BLD AUTO: 0.03 K/UL (ref 0–0.11)
IMM GRANULOCYTES # BLD AUTO: 0.05 K/UL (ref 0–0.11)
IMM GRANULOCYTES NFR BLD AUTO: 0.4 % (ref 0–0.9)
IMM GRANULOCYTES NFR BLD AUTO: 0.4 % (ref 0–0.9)
KETONES UR STRIP.AUTO-MCNC: NEGATIVE MG/DL
LACTATE SERPL-SCNC: 2 MMOL/L (ref 0.5–2)
LACTATE SERPL-SCNC: 4.1 MMOL/L (ref 0.5–2)
LEUKOCYTE ESTERASE UR QL STRIP.AUTO: ABNORMAL
LIPASE SERPL-CCNC: 17 U/L (ref 11–82)
LYMPHOCYTES # BLD AUTO: 0.71 K/UL (ref 1–4.8)
LYMPHOCYTES # BLD AUTO: 2.16 K/UL (ref 1–4.8)
LYMPHOCYTES NFR BLD: 28.9 % (ref 22–41)
LYMPHOCYTES NFR BLD: 5.6 % (ref 22–41)
MCH RBC QN AUTO: 30.1 PG (ref 27–33)
MCH RBC QN AUTO: 30.2 PG (ref 27–33)
MCHC RBC AUTO-ENTMCNC: 32 G/DL (ref 33.6–35)
MCHC RBC AUTO-ENTMCNC: 32.7 G/DL (ref 33.6–35)
MCV RBC AUTO: 92.4 FL (ref 81.4–97.8)
MCV RBC AUTO: 93.8 FL (ref 81.4–97.8)
MICRO URNS: ABNORMAL
MONOCYTES # BLD AUTO: 0.35 K/UL (ref 0–0.85)
MONOCYTES # BLD AUTO: 0.43 K/UL (ref 0–0.85)
MONOCYTES NFR BLD AUTO: 2.8 % (ref 0–13.4)
MONOCYTES NFR BLD AUTO: 5.7 % (ref 0–13.4)
NEUTROPHILS # BLD AUTO: 11.4 K/UL (ref 2–7.15)
NEUTROPHILS # BLD AUTO: 4.58 K/UL (ref 2–7.15)
NEUTROPHILS NFR BLD: 61.3 % (ref 44–72)
NEUTROPHILS NFR BLD: 90.2 % (ref 44–72)
NITRITE UR QL STRIP.AUTO: POSITIVE
NRBC # BLD AUTO: 0 K/UL
NRBC # BLD AUTO: 0 K/UL
NRBC BLD-RTO: 0 /100 WBC
NRBC BLD-RTO: 0 /100 WBC
PH UR STRIP.AUTO: 7 [PH] (ref 5–8)
PLATELET # BLD AUTO: 222 K/UL (ref 164–446)
PLATELET # BLD AUTO: 272 K/UL (ref 164–446)
PMV BLD AUTO: 9.5 FL (ref 9–12.9)
PMV BLD AUTO: 9.7 FL (ref 9–12.9)
POTASSIUM SERPL-SCNC: 3.2 MMOL/L (ref 3.6–5.5)
PROT SERPL-MCNC: 6.3 G/DL (ref 6–8.2)
PROT UR QL STRIP: NEGATIVE MG/DL
RBC # BLD AUTO: 3.56 M/UL (ref 4.2–5.4)
RBC # BLD AUTO: 4.07 M/UL (ref 4.2–5.4)
RBC # URNS HPF: ABNORMAL /HPF
RBC UR QL AUTO: NEGATIVE
SODIUM SERPL-SCNC: 140 MMOL/L (ref 135–145)
SP GR UR STRIP.AUTO: 1.04
TROPONIN T SERPL-MCNC: <6 NG/L (ref 6–19)
UROBILINOGEN UR STRIP.AUTO-MCNC: 0.2 MG/DL
WBC # BLD AUTO: 12.6 K/UL (ref 4.8–10.8)
WBC # BLD AUTO: 7.5 K/UL (ref 4.8–10.8)
WBC #/AREA URNS HPF: ABNORMAL /HPF

## 2023-02-01 PROCEDURE — 700117 HCHG RX CONTRAST REV CODE 255: Performed by: EMERGENCY MEDICINE

## 2023-02-01 PROCEDURE — 84484 ASSAY OF TROPONIN QUANT: CPT

## 2023-02-01 PROCEDURE — 96376 TX/PRO/DX INJ SAME DRUG ADON: CPT

## 2023-02-01 PROCEDURE — 71045 X-RAY EXAM CHEST 1 VIEW: CPT

## 2023-02-01 PROCEDURE — 85025 COMPLETE CBC W/AUTO DIFF WBC: CPT

## 2023-02-01 PROCEDURE — 700111 HCHG RX REV CODE 636 W/ 250 OVERRIDE (IP): Performed by: EMERGENCY MEDICINE

## 2023-02-01 PROCEDURE — 99284 EMERGENCY DEPT VISIT MOD MDM: CPT | Performed by: SURGERY

## 2023-02-01 PROCEDURE — 80053 COMPREHEN METABOLIC PANEL: CPT

## 2023-02-01 PROCEDURE — 87040 BLOOD CULTURE FOR BACTERIA: CPT

## 2023-02-01 PROCEDURE — 96375 TX/PRO/DX INJ NEW DRUG ADDON: CPT

## 2023-02-01 PROCEDURE — 93005 ELECTROCARDIOGRAM TRACING: CPT

## 2023-02-01 PROCEDURE — 86141 C-REACTIVE PROTEIN HS: CPT

## 2023-02-01 PROCEDURE — 74174 CTA ABD&PLVS W/CONTRAST: CPT

## 2023-02-01 PROCEDURE — 99285 EMERGENCY DEPT VISIT HI MDM: CPT

## 2023-02-01 PROCEDURE — 81001 URINALYSIS AUTO W/SCOPE: CPT

## 2023-02-01 PROCEDURE — 85652 RBC SED RATE AUTOMATED: CPT

## 2023-02-01 PROCEDURE — 700105 HCHG RX REV CODE 258: Performed by: EMERGENCY MEDICINE

## 2023-02-01 PROCEDURE — 83690 ASSAY OF LIPASE: CPT

## 2023-02-01 PROCEDURE — 83605 ASSAY OF LACTIC ACID: CPT | Mod: 91

## 2023-02-01 PROCEDURE — 36415 COLL VENOUS BLD VENIPUNCTURE: CPT

## 2023-02-01 PROCEDURE — 96374 THER/PROPH/DIAG INJ IV PUSH: CPT

## 2023-02-01 RX ORDER — SODIUM CHLORIDE 9 MG/ML
1000 INJECTION, SOLUTION INTRAVENOUS ONCE
Status: COMPLETED | OUTPATIENT
Start: 2023-02-01 | End: 2023-02-01

## 2023-02-01 RX ORDER — ZOLPIDEM TARTRATE 10 MG/1
10 TABLET ORAL NIGHTLY PRN
COMMUNITY

## 2023-02-01 RX ORDER — CHOLECALCIFEROL (VITAMIN D3) 125 MCG
2000 CAPSULE ORAL EVERY MORNING
Status: SHIPPED | COMMUNITY
End: 2023-08-18

## 2023-02-01 RX ORDER — CEFTRIAXONE 2 G/1
2000 INJECTION, POWDER, FOR SOLUTION INTRAMUSCULAR; INTRAVENOUS ONCE
Status: COMPLETED | OUTPATIENT
Start: 2023-02-01 | End: 2023-02-01

## 2023-02-01 RX ORDER — HYDROMORPHONE HYDROCHLORIDE 1 MG/ML
1 INJECTION, SOLUTION INTRAMUSCULAR; INTRAVENOUS; SUBCUTANEOUS ONCE
Status: COMPLETED | OUTPATIENT
Start: 2023-02-01 | End: 2023-02-01

## 2023-02-01 RX ORDER — SODIUM CHLORIDE, SODIUM LACTATE, POTASSIUM CHLORIDE, AND CALCIUM CHLORIDE .6; .31; .03; .02 G/100ML; G/100ML; G/100ML; G/100ML
30 INJECTION, SOLUTION INTRAVENOUS ONCE
Status: COMPLETED | OUTPATIENT
Start: 2023-02-01 | End: 2023-02-01

## 2023-02-01 RX ORDER — HYDROMORPHONE HYDROCHLORIDE 1 MG/ML
1 INJECTION, SOLUTION INTRAMUSCULAR; INTRAVENOUS; SUBCUTANEOUS ONCE
Status: DISCONTINUED | OUTPATIENT
Start: 2023-02-01 | End: 2023-02-01

## 2023-02-01 RX ORDER — ONDANSETRON 2 MG/ML
4 INJECTION INTRAMUSCULAR; INTRAVENOUS ONCE
Status: COMPLETED | OUTPATIENT
Start: 2023-02-01 | End: 2023-02-01

## 2023-02-01 RX ADMIN — HYDROMORPHONE HYDROCHLORIDE 1 MG: 1 INJECTION, SOLUTION INTRAMUSCULAR; INTRAVENOUS; SUBCUTANEOUS at 03:55

## 2023-02-01 RX ADMIN — SODIUM CHLORIDE 1000 ML: 9 INJECTION, SOLUTION INTRAVENOUS at 04:08

## 2023-02-01 RX ADMIN — CEFTRIAXONE SODIUM 2000 MG: 2 INJECTION, POWDER, FOR SOLUTION INTRAMUSCULAR; INTRAVENOUS at 06:35

## 2023-02-01 RX ADMIN — IOHEXOL 80 ML: 350 INJECTION, SOLUTION INTRAVENOUS at 04:56

## 2023-02-01 RX ADMIN — HYDROMORPHONE HYDROCHLORIDE 1 MG: 1 INJECTION, SOLUTION INTRAMUSCULAR; INTRAVENOUS; SUBCUTANEOUS at 03:33

## 2023-02-01 RX ADMIN — SODIUM CHLORIDE, POTASSIUM CHLORIDE, SODIUM LACTATE AND CALCIUM CHLORIDE 300 ML: 600; 310; 30; 20 INJECTION, SOLUTION INTRAVENOUS at 05:37

## 2023-02-01 RX ADMIN — HYDROMORPHONE HYDROCHLORIDE 1 MG: 1 INJECTION, SOLUTION INTRAMUSCULAR; INTRAVENOUS; SUBCUTANEOUS at 05:04

## 2023-02-01 RX ADMIN — ONDANSETRON 4 MG: 2 INJECTION INTRAMUSCULAR; INTRAVENOUS at 04:46

## 2023-02-01 ASSESSMENT — FIBROSIS 4 INDEX: FIB4 SCORE: 1.29

## 2023-02-01 NOTE — ED NOTES
Pt resting with eyes closed. Resps even and unlabored. No needs identified and call light in reach

## 2023-02-01 NOTE — ED NOTES
Phlebotomist at bedside for second BC and lactic acid draw.  ha gone home for the morning -- asks to be updated when poc established (use cell phone # listed in demos)

## 2023-02-01 NOTE — ED TRIAGE NOTES
BIBA from home for sudden onset lower bilateral abd pain. N/V associated x2 episodes of stomach contents. No changes to medications or diet.    H/o fibromyalgia.     100mcg Fentanyl and 4mg zofran given en route. Pain now 6/10

## 2023-02-01 NOTE — ED PROVIDER NOTES
ED Provider Note    CHIEF COMPLAINT  Chief Complaint   Patient presents with    Abdominal Pain    N/V       EXTERNAL RECORDS REVIEWED  Inpatient Notes seen in ER June of last year for wound infection to groin.    HPI/ROS  LIMITATION TO HISTORY   Select: : None  OUTSIDE HISTORIAN(S):  Family  at bedside    Adrianne Martinez is a 67 y.o. female who presents to the emerge department with sharp abdominal pain starting this evening.  Past medical history as documented below.  She explains that this evening she was sitting on the couch when she had sudden onset of sharp pain which is diffuse across her abdomen.  No radiation of pain.  Specifically no radiation to her back.  No chest pain or palpitations.  No shortness of breath.  She has had associated nausea and vomiting.  No diarrhea.  No recent illness.  Had eaten a noodle dinner around 4:56 PM hours before the onset of her pain.  Past surgical history as documented below.    Brought in by EMS.  She was given 100 mcg of fentanyl and 4 mg of Zofran but continues to have moderate to severe pain.  No notable aggravating leaving factors.  No recent travel.    PAST MEDICAL HISTORY   has a past medical history of Anesthesia, Bowel habit changes, Breast cancer (HCC), Cancer (HCC) (2017), Cancer (HCC), Cataract, Chronic pain, Chronic pelvic pain in female, Colon polyp, ENDOMETRIOSIS, Endometriosis, Family history of colon cancer, Gynecological disorder, Heart burn, Hepatitis, Hyperlipidemia, Indigestion, Insomnia, Jaundice (1967 ), Pain, Pain, Pneumonia (2006), Postmenopausal HRT (hormone replacement therapy), Psychiatric problem, Recurrent UTI, Seizure (HCC), Snoring, and Urine frequency.    SURGICAL HISTORY   has a past surgical history that includes lumpectomy (2007); other orthopedic surgery (1964); breast biopsy; breast biopsy; laparoscopy (9875-5090); cataract extraction with iol (Bilateral); abdominal hysterectomy total (1999); ovarian cystectomy (1985);  "mastectomy (Left, 2/8/2017); node biopsy sentinel (2/8/2017); breast biopsy (Left, 2/16/2017); and lumpectomy (Left, Feb 2017).    FAMILY HISTORY  Family History   Problem Relation Age of Onset    Cancer Mother         breast cancer    Cancer Father         colon, throat, and skin     Heart Attack Brother 50       SOCIAL HISTORY  Social History     Tobacco Use    Smoking status: Never    Smokeless tobacco: Never   Substance and Sexual Activity    Alcohol use: Yes     Alcohol/week: 0.6 oz     Types: 1 Glasses of wine per week     Comment: 1-3 per week     Drug use: No    Sexual activity: Yes     Partners: Male       CURRENT MEDICATIONS  Home Medications    **Home medications have not yet been reviewed for this encounter**         ALLERGIES  Allergies   Allergen Reactions    Meperidine Hives     6/28/2021 Pt unable to verify reaction/allergy    Oxycontin [Oxycodone Hcl] Vomiting     Able to take vicodin.  6/28/2021 Pt unable to verify reaction/allergy    Statins [Hmg-Coa-R Inhibitors] Diarrhea and Nausea     6/28/2021 Pt unable to verify reaction/allergy    Cherry Rash     Raw cherries     Latex Rash and Itching          Nsaids Rash             PHYSICAL EXAM  VITAL SIGNS: /78   Pulse 77   Temp 36.4 °C (97.6 °F) (Temporal)   Resp (!) 23   Ht 1.575 m (5' 2\")   Wt 45.4 kg (100 lb)   SpO2 98%   BMI 18.29 kg/m²      Pulse ox interpretation: I interpret this pulse ox as normal.  Constitutional: Holding emesis bag  HENT: No signs of trauma, Bilateral external ears normal, Nose normal.   Eyes: Pupils are equal and reactive  Neck: Normal range of motion, No tenderness, Supple  Cardiovascular: Regular rate and rhythm, no murmurs.   Thorax & Lungs: Normal breath sounds, No respiratory distress, No wheezing, No chest tenderness.   Abdomen: Bowel sounds normal, Soft, No tenderness  Skin: Warm, Dry, No erythema, No rash.   Extremities: Intact distal pulses, No edema, No tenderness, No cyanosis,  Negative Kehinde's sign. "   Musculoskeletal: Good range of motion in all major joints. No tenderness to palpation or major deformities noted.   Neurologic: Alert , Normal motor function, Normal sensory function, No focal deficits noted.   Psychiatric: Affect normal, Judgment normal, Mood normal.         DIAGNOSTIC STUDIES / PROCEDURES    LABS  Results for orders placed or performed during the hospital encounter of 02/01/23   CBC with Differential   Result Value Ref Range    WBC 7.5 4.8 - 10.8 K/uL    RBC 3.56 (L) 4.20 - 5.40 M/uL    Hemoglobin 10.7 (L) 12.0 - 16.0 g/dL    Hematocrit 33.4 (L) 37.0 - 47.0 %    MCV 93.8 81.4 - 97.8 fL    MCH 30.1 27.0 - 33.0 pg    MCHC 32.0 (L) 33.6 - 35.0 g/dL    RDW 44.3 35.9 - 50.0 fL    Platelet Count 222 164 - 446 K/uL    MPV 9.7 9.0 - 12.9 fL    Neutrophils-Polys 61.30 44.00 - 72.00 %    Lymphocytes 28.90 22.00 - 41.00 %    Monocytes 5.70 0.00 - 13.40 %    Eosinophils 2.50 0.00 - 6.90 %    Basophils 1.20 0.00 - 1.80 %    Immature Granulocytes 0.40 0.00 - 0.90 %    Nucleated RBC 0.00 /100 WBC    Neutrophils (Absolute) 4.58 2.00 - 7.15 K/uL    Lymphs (Absolute) 2.16 1.00 - 4.80 K/uL    Monos (Absolute) 0.43 0.00 - 0.85 K/uL    Eos (Absolute) 0.19 0.00 - 0.51 K/uL    Baso (Absolute) 0.09 0.00 - 0.12 K/uL    Immature Granulocytes (abs) 0.03 0.00 - 0.11 K/uL    NRBC (Absolute) 0.00 K/uL   Complete Metabolic Panel   Result Value Ref Range    Sodium 140 135 - 145 mmol/L    Potassium 3.2 (L) 3.6 - 5.5 mmol/L    Chloride 108 96 - 112 mmol/L    Co2 19 (L) 20 - 33 mmol/L    Anion Gap 13.0 7.0 - 16.0    Glucose 124 (H) 65 - 99 mg/dL    Bun 17 8 - 22 mg/dL    Creatinine 0.42 (L) 0.50 - 1.40 mg/dL    Calcium 8.2 (L) 8.5 - 10.5 mg/dL    AST(SGOT) 21 12 - 45 U/L    ALT(SGPT) 15 2 - 50 U/L    Alkaline Phosphatase 67 30 - 99 U/L    Total Bilirubin <0.2 0.1 - 1.5 mg/dL    Albumin 4.0 3.2 - 4.9 g/dL    Total Protein 6.3 6.0 - 8.2 g/dL    Globulin 2.3 1.9 - 3.5 g/dL    A-G Ratio 1.7 g/dL   Lipase   Result Value Ref Range     Lipase 17 11 - 82 U/L   Urinalysis    Specimen: Urine   Result Value Ref Range    Color Yellow     Character Clear     Specific Gravity 1.037 <1.035    Ph 7.0 5.0 - 8.0    Glucose Negative Negative mg/dL    Ketones Negative Negative mg/dL    Protein Negative Negative mg/dL    Bilirubin Negative Negative    Urobilinogen, Urine 0.2 Negative    Nitrite Positive (A) Negative    Leukocyte Esterase Moderate (A) Negative    Occult Blood Negative Negative    Micro Urine Req Microscopic    TROPONIN   Result Value Ref Range    Troponin T <6 6 - 19 ng/L   LACTIC ACID   Result Value Ref Range    Lactic Acid 4.1 (HH) 0.5 - 2.0 mmol/L   CORRECTED CALCIUM   Result Value Ref Range    Correct Calcium 8.2 (L) 8.5 - 10.5 mg/dL   ESTIMATED GFR   Result Value Ref Range    GFR (CKD-EPI) 108 >60 mL/min/1.73 m 2   URINE MICROSCOPIC (W/UA)   Result Value Ref Range    WBC 5-10 (A) /hpf    RBC 5-10 (A) /hpf    Bacteria Many (A) None /hpf    Epithelial Cells Moderate (A) /hpf    Hyaline Cast 0-2 /lpf   EKG   Result Value Ref Range    Report       [facility] Emergency Dept.    Test Date:  2023  Pt Name:    GIRMA CANTU                 Department: ER  MRN:        [id number]                      Room:       RD 04  Gender:     Female                       Technician: [id number]  :        1955                   Requested By:ER TRIAGE PROTOCOL  Order #:    [id number]                    Reading MD: Abdifatah Aceves    Measurements  Intervals                                Axis  Rate:       73                           P:          96  OH:         139                          QRS:        67  QRSD:       207                          T:          75  QT:         431  QTc:        475    Interpretive Statements  Sinus rhythm  IVCD, consider atypical RBBB  Anteroseptal infarct, age indeterminate  Lateral leads are also involved  No previous ECG available for comparison  Electronically Signed On 2023 3:58:18 PST by Abdifatah Aceves            RADIOLOGY  I have independently interpreted the diagnostic imaging associated with this visit and am waiting the final reading from the radiologist.   My preliminary interpretation is a follows: dx chest: nad  Radiologist interpretation:   DX-CHEST-PORTABLE (1 VIEW)   Final Result         1.  No acute cardiopulmonary disease.      CTA ABDOMEN PELVIS W & W/O POST PROCESS   Final Result         1.  Fluid-filled reactive small bowel loops, appearance suggests ileus and/or enteritis. Consider inflammatory, infectious, or ischemic etiologies in the appropriate clinical setting. Radiographic follow-up to resolution recommended to exclude    progression to obstructive changes.   2.  Approximately 65% stenosis of the origin of the celiac artery. Approximately 67% stenosis of the origin of the superior mesenteric artery with possible short segment of dissection.   3.  Atherosclerosis   4.  Diverticulosis   5.  Enhancing lesion in the left hepatic lobe, could represent flash fill hemangioma, otherwise indeterminate. Recommend follow-up 3 phase CT liver or MRI of the liver with contrast for further characterization      These findings were discussed with the patient's clinician, Abdifatah Aceves, on 2/1/2023 5:32 AM.            COURSE & MEDICAL DECISION MAKING    ED Observation Status?  Patient has been placed in the ER observational status on February 1, 2023 at 0600.    Observational plan: Patient presented with acute abdominal pain.  Concern for possible ischemic bowel given her presentation.  CT imaging showing SMA and celiac stenosis.  Possible dissection as described above.  I discussed the case with on-call vascular surgeon.  At this point the patient has had significant narcotic dosing but is currently stating that she is nearly resolved from a pain perspective.  Initially vascular surgeon was can take patient to the OR but at this point given improving pain will delay final disposition within the hospital.   Vascular surgeon request that we finish IV fluids, repeat lactic acid and he will reevaluate at that time.  Unsure whether the patient should go to general surgery, medicine floor, ICU or OR.  Again further diagnostics and time will delineate this.    No significant clinical change after the this point.  I have signed out to my colleague Dr. Osorio which will continue care with vascular surgery and care teams.    INITIAL ASSESSMENT, COURSE AND PLAN  Care Narrative: Patient presented emerged part with acute onset of sharp 10 out of 10 abdominal pain.  Prior surgical history as above.  Concern for pain out of proportion differential      0 536: Discussed case with vascular surgeon Dr. Peguero wish on-call.  This is comes after radiology reviewed imaging with me.      DISPOSITION AND DISCUSSIONS  I have discussed management of the patient with the following physicians and JW's: At this point I discussed the case with the radiologist and vascular surgeon.  I have signed out to my colleague Dr. Osorio.    From an infection standpoint the patient was started on empiric ceftriaxone.  Initial IV bolus with normal saline and then finished with LR.  Repeat lactic is pending although first lactic acid is significant elevated at just over 4  FINAL DIAGNOSIS  1. Acute abdominal pain    2. Celiac artery stenosis (HCC)    3. Superior mesenteric artery stenosis (HCC)    4. Acute UTI           Electronically signed by: Abdifatah Aceves M.D., 2/1/2023 3:56 AM

## 2023-02-01 NOTE — ED NOTES
Report from Melba GRAVES. Assumed pt care @ this time.  Pt resting with eyes closed no s/s of pain ro discomfort @ this time. Will cont to monitor pt

## 2023-02-01 NOTE — DISCHARGE SUMMARY
"ED Observation Discharge Summary    Scribed for Graham Osorio M.d. by Rosalba Olivier. 2023  6:44 AM    Patient:Adrianne Martinez  Patient : 1955  Patient MRN: [id number]  Patient PCP: Danielle Blas M.D.    Admit Date: 2023  Discharge Date and Time: 23 6:44 AM  Discharge Diagnosis: Possible SMA syndrome, abdominal pain resolved  Discharge Attending: Rosalba Olivier  Discharge Service: ED Observation    ED Course  I was signed out final disposition of the patient at 6:30 AM. I am currently pending a repeat lactate. CT showed a finding of partial occlusion of superior mesenteric artery and celiac artery. There is questionable ileus or enteritis with a lactate of 4.1. Will consult Dr. Badillo pending repeat lactate.    8:01 AM - Patients lactate dropped to 2. I informed Dr. Badillo at this time. Patient was reevaluated at bedside. She informs me she is resting pain free with a soft abdomen.     8:13 AM - I spoke with Dr. Badillo who recommends obtaining an ESR and CRP. I will inform him of the results and we will discuss plan for admission vs discharge. Ordered for CRP high sensitivity and sedation rate for further evaluation of patients symptoms.    Discharge Exam:  /78   Pulse 77   Temp 36.4 °C (97.6 °F) (Temporal)   Resp (!) 23   Ht 1.575 m (5' 2\")   Wt 45.4 kg (100 lb)   SpO2 98%   BMI 18.29 kg/m² .    Constitutional: Awake and alert. Nontoxic  HENT:  Grossly normal  Eyes: Grossly normal  Neck: Normal range of motion  Cardiovascular: Normal heart rate   Thorax & Lungs: No respiratory distress  Abdomen: Nontender, soft abdomen  Skin:  No pathologic rash.   Extremities: Well perfused  Psychiatric: Affect normal    Labs  Results for orders placed or performed during the hospital encounter of 23   CBC with Differential   Result Value Ref Range    WBC 7.5 4.8 - 10.8 K/uL    RBC 3.56 (L) 4.20 - 5.40 M/uL    Hemoglobin 10.7 (L) 12.0 - 16.0 g/dL    Hematocrit 33.4 (L) 37.0 - 47.0 %    " MCV 93.8 81.4 - 97.8 fL    MCH 30.1 27.0 - 33.0 pg    MCHC 32.0 (L) 33.6 - 35.0 g/dL    RDW 44.3 35.9 - 50.0 fL    Platelet Count 222 164 - 446 K/uL    MPV 9.7 9.0 - 12.9 fL    Neutrophils-Polys 61.30 44.00 - 72.00 %    Lymphocytes 28.90 22.00 - 41.00 %    Monocytes 5.70 0.00 - 13.40 %    Eosinophils 2.50 0.00 - 6.90 %    Basophils 1.20 0.00 - 1.80 %    Immature Granulocytes 0.40 0.00 - 0.90 %    Nucleated RBC 0.00 /100 WBC    Neutrophils (Absolute) 4.58 2.00 - 7.15 K/uL    Lymphs (Absolute) 2.16 1.00 - 4.80 K/uL    Monos (Absolute) 0.43 0.00 - 0.85 K/uL    Eos (Absolute) 0.19 0.00 - 0.51 K/uL    Baso (Absolute) 0.09 0.00 - 0.12 K/uL    Immature Granulocytes (abs) 0.03 0.00 - 0.11 K/uL    NRBC (Absolute) 0.00 K/uL   Complete Metabolic Panel   Result Value Ref Range    Sodium 140 135 - 145 mmol/L    Potassium 3.2 (L) 3.6 - 5.5 mmol/L    Chloride 108 96 - 112 mmol/L    Co2 19 (L) 20 - 33 mmol/L    Anion Gap 13.0 7.0 - 16.0    Glucose 124 (H) 65 - 99 mg/dL    Bun 17 8 - 22 mg/dL    Creatinine 0.42 (L) 0.50 - 1.40 mg/dL    Calcium 8.2 (L) 8.5 - 10.5 mg/dL    AST(SGOT) 21 12 - 45 U/L    ALT(SGPT) 15 2 - 50 U/L    Alkaline Phosphatase 67 30 - 99 U/L    Total Bilirubin <0.2 0.1 - 1.5 mg/dL    Albumin 4.0 3.2 - 4.9 g/dL    Total Protein 6.3 6.0 - 8.2 g/dL    Globulin 2.3 1.9 - 3.5 g/dL    A-G Ratio 1.7 g/dL   Lipase   Result Value Ref Range    Lipase 17 11 - 82 U/L   Urinalysis    Specimen: Urine   Result Value Ref Range    Color Yellow     Character Clear     Specific Gravity 1.037 <1.035    Ph 7.0 5.0 - 8.0    Glucose Negative Negative mg/dL    Ketones Negative Negative mg/dL    Protein Negative Negative mg/dL    Bilirubin Negative Negative    Urobilinogen, Urine 0.2 Negative    Nitrite Positive (A) Negative    Leukocyte Esterase Moderate (A) Negative    Occult Blood Negative Negative    Micro Urine Req Microscopic    TROPONIN   Result Value Ref Range    Troponin T <6 6 - 19 ng/L   LACTIC ACID   Result Value Ref Range     Lactic Acid 4.1 (HH) 0.5 - 2.0 mmol/L   CORRECTED CALCIUM   Result Value Ref Range    Correct Calcium 8.2 (L) 8.5 - 10.5 mg/dL   ESTIMATED GFR   Result Value Ref Range    GFR (CKD-EPI) 108 >60 mL/min/1.73 m 2   URINE MICROSCOPIC (W/UA)   Result Value Ref Range    WBC 5-10 (A) /hpf    RBC 5-10 (A) /hpf    Bacteria Many (A) None /hpf    Epithelial Cells Moderate (A) /hpf    Hyaline Cast 0-2 /lpf   CBC WITH DIFFERENTIAL   Result Value Ref Range    WBC 12.6 (H) 4.8 - 10.8 K/uL    RBC 4.07 (L) 4.20 - 5.40 M/uL    Hemoglobin 12.3 12.0 - 16.0 g/dL    Hematocrit 37.6 37.0 - 47.0 %    MCV 92.4 81.4 - 97.8 fL    MCH 30.2 27.0 - 33.0 pg    MCHC 32.7 (L) 33.6 - 35.0 g/dL    RDW 43.9 35.9 - 50.0 fL    Platelet Count 272 164 - 446 K/uL    MPV 9.5 9.0 - 12.9 fL    Neutrophils-Polys 90.20 (H) 44.00 - 72.00 %    Lymphocytes 5.60 (L) 22.00 - 41.00 %    Monocytes 2.80 0.00 - 13.40 %    Eosinophils 0.30 0.00 - 6.90 %    Basophils 0.70 0.00 - 1.80 %    Immature Granulocytes 0.40 0.00 - 0.90 %    Nucleated RBC 0.00 /100 WBC    Neutrophils (Absolute) 11.40 (H) 2.00 - 7.15 K/uL    Lymphs (Absolute) 0.71 (L) 1.00 - 4.80 K/uL    Monos (Absolute) 0.35 0.00 - 0.85 K/uL    Eos (Absolute) 0.04 0.00 - 0.51 K/uL    Baso (Absolute) 0.09 0.00 - 0.12 K/uL    Immature Granulocytes (abs) 0.05 0.00 - 0.11 K/uL    NRBC (Absolute) 0.00 K/uL   EKG   Result Value Ref Range    Report       [facility] Emergency Dept.    Test Date:  2023  Pt Name:    GIRMA CANTU                 Department: ER  MRN:        [id number]                      Room:       [facility]  Gender:     Female                       Technician: [id number]  :        1955                   Requested By:ER TRIAGE PROTOCOL  Order #:    [id number]                    Reading MD: Abdifatah Aceves    Measurements  Intervals                                Axis  Rate:       73                           P:          96  IA:         139                          QRS:        67  QRSD:        207                          T:          75  QT:         431  QTc:        475    Interpretive Statements  Sinus rhythm  IVCD, consider atypical RBBB  Anteroseptal infarct, age indeterminate  Lateral leads are also involved  No previous ECG available for comparison  Electronically Signed On 2-1-2023 3:58:18 PST by Abdifatah Aceves         Radiology  DX-CHEST-PORTABLE (1 VIEW)   Final Result         1.  No acute cardiopulmonary disease.      CTA ABDOMEN PELVIS W & W/O POST PROCESS   Final Result         1.  Fluid-filled reactive small bowel loops, appearance suggests ileus and/or enteritis. Consider inflammatory, infectious, or ischemic etiologies in the appropriate clinical setting. Radiographic follow-up to resolution recommended to exclude    progression to obstructive changes.   2.  Approximately 65% stenosis of the origin of the celiac artery. Approximately 67% stenosis of the origin of the superior mesenteric artery with possible short segment of dissection.   3.  Atherosclerosis   4.  Diverticulosis   5.  Enhancing lesion in the left hepatic lobe, could represent flash fill hemangioma, otherwise indeterminate. Recommend follow-up 3 phase CT liver or MRI of the liver with contrast for further characterization      These findings were discussed with the patient's clinician, Abdifatah Aceves, on 2/1/2023 5:32 AM.          Medications:   New Prescriptions    No medications on file       My final assessment includes patient is resting pain free with a soft abdomen.   Upon Reevaluation, the patient's condition has: Improved; and will be discharged.    Patient discharged from ED Observation status at 9:30 AM (Time) 02/01/23   (Date).     Total time spent on this ED Observation discharge encounter is > 30 Minutes  I spoke with Dr. Sudhir sue and he was concerned about possibly autoimmune disorder but ESR and CRP are normal.  Given her unremarkable abdominal exam and pain-free status it safe for her to did be discharged with  instructions on taking small meals rather than large meals in case she has SMA syndrome and to follow-up with her doctor    1. Acute abdominal pain        2. Celiac artery stenosis (HCC)        3. Superior mesenteric artery stenosis (HCC)        4. Acute UTI               Rosalba SALMERON (Mony), am scribing for, and in the presence of, Graham Osorio M.D..    Electronically signed by: Rosalba Leach), 2/1/2023    Graham SALMERON M.D. personally performed the services described in this documentation, as scribed by Rosalba Olivier in my presence, and it is both accurate and complete.    The note accurately reflects work and decisions made by me.  Graham Osorio M.D.  2/1/2023  9:52 AM

## 2023-02-01 NOTE — ED NOTES
Pt placed on purewick in an attempt to urinate, but unable to use due to discomfort. Pain is still high so erp notified.

## 2023-02-01 NOTE — ED NOTES
"Pt discharged to home w/ steady gait. Pt A&ox4 on RA. IV discontinued and gauze placed, pt in possession of belongings. Pt provided discharge education and information pertaining to medications and follow up appointments. Pt received copy of discharge instructions and verbalized understanding. /59   Pulse 79   Temp 36.5 °C (97.7 °F) (Temporal)   Resp 20   Ht 1.575 m (5' 2\")   Wt 45.4 kg (100 lb)   SpO2 98%   BMI 18.29 kg/m²   "

## 2023-02-01 NOTE — DISCHARGE INSTRUCTIONS
You may have SMA syndrome.  Take 4 or 5 small meals per day versus 2 or 3 large meals to avoid abdominal pain  Return if significant abdominal pain that does not improve especially with vomiting and fever

## 2023-02-01 NOTE — ED NOTES
Pharmacy Medication Reconciliation      ~Medication reconciliation updated and complete per patient at bedside  ~Allergies have been verified and updated   ~No oral ABX within the last 30 days  ~[facility] home pharmacy :  [facility]             Fran Tineo

## 2023-02-01 NOTE — CONSULTS
"                    Vascular Surgery          New Patient Consultation    Patient:Adrianne Martinez  MRN:[id number]    Date: 2/1/2023    Referring Provider: Graham Osorio M.d.    Consulting Physician: Ye Badillo MD  _____________________________________________________    Reason for consultation:  Abdominal pain, mesenteric artery stenosis    HPI:  This is a 67 y.o. female who presented to the ER overnight after acute onset of severe diffuse abdominal pain that started after eating dinner last night.  Patient also reports that she vomited about 8 times.  She reports no flatus or bowel movement since last night when the pain started.  She has a history of an open hysterectomy about 20 years ago and open removal of an ovarian cyst about 30 years ago.  No prior history of bowel obstruction.  No prior history of vascular disease.  CTA was performed which showed fluid-filled small bowel loops and also moderate stenosis of the celiac artery and moderate stenosis of the superior mesenteric artery, patency of the inferior mesenteric artery, and no evidence of atherosclerotic disease anywhere on the CAT scan.  Currently the patient is alert and conversant with no abdominal pain no nausea and no vomiting and has not received a recent dose of pain medication or antinausea medication.  The symptoms completely resolved spontaneously after the CT scan.  No flatus or bowel movement since his symptoms resolved however no nausea or vomiting since his symptoms resolved either.    Past Medical History:   Diagnosis Date    Anesthesia     \"heart stopped during colonoscopy\"     Bowel habit changes     constipation     Breast cancer (HCC)     left    Cancer (HCC) 2017    left breast    Cancer (HCC)     Breast     Cataract     bilat IOL    Chronic pain     Chronic pelvic pain in female     Colon polyp     ENDOMETRIOSIS     Endometriosis     Family history of colon cancer     Gynecological disorder     Heart burn     Hepatitis     as a " "child, does not know what kind \"was very sick, then I recovered\"     Hyperlipidemia     Indigestion     Insomnia     Jaundice 1967     r/t hepatitis     Pain     right hip, neck, back     Pain     Pneumonia 2006    Postmenopausal HRT (hormone replacement therapy)     Psychiatric problem     depression     Recurrent UTI     Seizure (HCC)     last seizure 8/2016 (only had 2 seizures did not follow up with neuro, no meds)     Snoring     Urine frequency        Past Surgical History:   Procedure Laterality Date    BREAST BIOPSY Left 2/16/2017    Procedure: BREAST BIOPSY-RE EXCISION FOR MARGINS ;  Surgeon: Adriano Baird M.D.;  Location: SURGERY [facility];  Service:     MASTECTOMY Left 2/8/2017    Procedure: MASTECTOMY - PARTIAL PLACEMENT OF CAVITY EVALUATION DEVICE;  Surgeon: Adriano Baird M.D.;  Location: SURGERY [facility];  Service:     NODE BIOPSY SENTINEL  2/8/2017    Procedure: NODE BIOPSY SENTINEL;  Surgeon: Adriano Baird M.D.;  Location: SURGERY [facility];  Service:     LUMPECTOMY Left Feb 2017    LUMPECTOMY  2007    Left - benign    ABDOMINAL HYSTERECTOMY TOTAL  1999    for endometriosis    OVARIAN CYSTECTOMY  1985    OTHER ORTHOPEDIC SURGERY  1964    ORIF L elbow compound fx    BREAST BIOPSY      left breast    BREAST BIOPSY      left breast biopsy 2008 (benign)    CATARACT EXTRACTION WITH IOL Bilateral     LAPAROSCOPY  [id number]    multiple for endometriosis       No current facility-administered medications for this encounter.     Current Outpatient Medications   Medication Sig Dispense Refill    zolpidem (AMBIEN) 10 MG Tab Take 10 mg by mouth at bedtime as needed for Sleep.      Cholecalciferol (VITAMIN D3) 50 MCG (2000 UT) Tab Take 2,000 Units by mouth every morning.      Alirocumab (PRALUENT) 75 MG/ML Solution Auto-injector Inject 75 mg under the skin every 14 days. 2 mL 3    hydrOXYzine HCl (ATARAX) 25 MG Tab Take 1 Tablet by mouth 3 times a day as needed for Itching. 30 Tablet 0    " CALCIUM PO Take 300 mg by mouth every morning.      Cyanocobalamin (B-12) 5000 MCG Cap Take 5,000 mcg by mouth every morning.      NALTREXONE HCL PO Take 3 mg by mouth at bedtime.      multivitamin (THERAGRAN) Tab Take 1 tablet by mouth every morning.      DULoxetine (CYMBALTA) 60 MG Cap DR Particles delayed-release capsule Take 60 mg by mouth at bedtime.      estradiol (ESTRACE) 0.1 MG/GM vaginal cream Insert 1 Applicatorful into the vagina two times a week.      Thyroid 97.5 MG Tab Take 48.75 mg by mouth every morning. 1/2 tablet = 48.75 mg.  Indications: titrating dose managed by Dr. Cleannig      TURMERIC PO Take 1,000 mg by mouth every morning.      Coenzyme Q10 (COQ10) 100 MG Cap Take 100 mg by mouth every morning.      pregabalin (LYRICA) 25 MG Cap Take 50 mg by mouth at bedtime. 2 to 3 capsules = 50 to 75 mg.      Ascorbic Acid (VITAMIN C) 1000 MG Tab Take 1,000 mg by mouth every evening.      Omega-3 Fatty Acids (FISH OIL) 1000 MG Cap capsule Take 1,000 mg by mouth every morning.      tizanidine (ZANAFLEX) 4 MG Tab Take 4 mg by mouth at bedtime. Indications: Musculoskeletal Pain         Social History     Socioeconomic History    Marital status:      Spouse name: Not on file    Number of children: 2    Years of education: Not on file    Highest education level: Not on file   Occupational History    Not on file   Tobacco Use    Smoking status: Never    Smokeless tobacco: Never   Substance and Sexual Activity    Alcohol use: Yes     Alcohol/week: 0.6 oz     Types: 1 Glasses of wine per week     Comment: 1-3 per week     Drug use: No    Sexual activity: Yes     Partners: Male   Other Topics Concern    Not on file   Social History Narrative    ** Merged History Encounter **         Was previously an  - didn't like her job - didn't like conflict.       Social Determinants of Health     Financial Resource Strain: Not on file   Food Insecurity: Not on file   Transportation Needs: Not on file  "  Physical Activity: Not on file   Stress: Not on file   Social Connections: Not on file   Intimate Partner Violence: Not on file   Housing Stability: Not on file       Family History   Problem Relation Age of Onset    Cancer Mother         breast cancer    Cancer Father         colon, throat, and skin     Heart Attack Brother 50       Allergies:  Meperidine, Oxycontin [oxycodone hcl], Statins [hmg-coa-r inhibitors], Cherry, Latex, and Nsaids    Review of Systems:    Constitutional: Negative for fever or chills  HENT:   Negative for hearing loss or tinnitus    Eyes:    Negative for blurred vision or loss of vision  Respiratory:  Negative for cough or hemoptysis  Cardiac:  Negative for chest pain or palpitations  Vascular:  Negative for claudication, Negative for rest pain  Gastrointestinal: As per HPI  Genitourinary: Negative for dysuria or hematuria   Musculoskeletal: Negative for myalgias or acute joint pain  Skin:   Negative for itching or rash  Neurological:  Negative for dizziness or headaches     Negative for speech disturbance     Negative for extremity weakness or paresthesias  Endo/Heme:  Negative for easy bruising or bleeding    Physical Exam:  /57   Pulse 86   Temp 36.4 °C (97.6 °F) (Temporal)   Resp (!) 11   Ht 1.575 m (5' 2\")   Wt 45.4 kg (100 lb)   SpO2 98%   BMI 18.29 kg/m²     Constitutional: Alert, oriented, no acute distress  HEENT:  Normocephalic and atraumatic, EOMI  Neck:   Supple, no JVD,   Cardiovascular: Regular rate and rhythm,   Pulmonary:  Good air entry bilaterally,    Abdominal:  Soft, non-tender, non-distended     Aortic impulse not widened  Musculoskeletal: No tenderness, no deformity  Neurological:  CN II-XII grossly intact, no focal deficits  Skin:   Skin is warm and dry. No rash noted.  Vascular exam:    RUE: warm, cap refill<3 seconds, no edema, no tissue loss,   LUE: warm, cap refill<3 seconds, no edema, no tissue loss,   RLE: warm, cap refill<3 seconds, no edema, no " tissue loss,   LLE: warm, cap refill<3 seconds, no edema, no tissue loss,       Labs:  Recent Labs     02/01/23  0301 02/01/23  0540   WBC 7.5 12.6*   RBC 3.56* 4.07*   HEMOGLOBIN 10.7* 12.3   HEMATOCRIT 33.4* 37.6   MCV 93.8 92.4   MCH 30.1 30.2   MCHC 32.0* 32.7*   RDW 44.3 43.9   PLATELETCT 222 272   MPV 9.7 9.5     Recent Labs     02/01/23  0301   SODIUM 140   POTASSIUM 3.2*   CHLORIDE 108   CO2 19*   GLUCOSE 124*   BUN 17   CREATININE 0.42*   CALCIUM 8.2*         Recent Labs     02/01/23  0301   ASTSGOT 21   ALTSGPT 15   TBILIRUBIN <0.2   ALKPHOSPHAT 67   GLOBULIN 2.3         Radiology:  CTA was performed which showed fluid-filled small bowel loops and also moderate stenosis of the celiac artery and moderate stenosis of the superior mesenteric artery, patency of the inferior mesenteric artery, and no evidence of atherosclerotic disease anywhere on the CAT scan.      Assessment/Plan:   -Abdominal pain  -Moderate celiac artery stenosis  -Moderate superior mesenteric artery stenosis    Based on the patient's symptoms and CT scan appearance I believe the most likely explanation for her abdominal pain was a bowel obstruction which has since spontaneously resolved.  The fluid-filled bowel loops could potentially be associated with ischemia, however the amount of vomiting she had is not necessarily characteristic of mesenteric ischemia, and all 3 mesenteric arteries are patent although there is moderate stenosis of the celiac and SMA these findings would not be expected to cause an acute mesenteric ischemic event.  All of her symptoms have spontaneously resolved, which also supports the possibility this was a bowel obstruction rather than mesenteric ischemia.    The moderate celiac and moderate SMA stenoses are probably chronic findings, however the patient does not have any evidence of atherosclerotic disease anywhere else on her CT scan so this does raise the possibility that this could be arteritis.  We will check  a sed rate and CRP.  If these are normal then I believe the likelihood of arteritis is low and the patient can be safely discharged.  If the sed rate and CRP are elevated then this may raise the possibility of arteritis and steroid treatment may be required.  Further recommendations pending the results of the ESR/CRP.    I did recommend to the patient that she undergo routine follow-up visits in the vascular clinic so that we can periodically check mesenteric duplex and verify that her celiac and SMA stenosis are not getting worse over time.  Most likely the patient will require 6-month surveillance duplex ultrasound.  I gave her my clinic information and instructed her to follow-up with me in about 1 to 2 months to establish surveillance.      Ye Badillo MD  [facility]   [place] preferred or call my office [phone]  __________________________________________________________________  Patient:Adrianne Martinez   MRN:[id number]   CSN:[id number]

## 2023-02-01 NOTE — ED NOTES
Pt ambulated to the restroom with a steady gait. No return of pain when back in room. Nausea controlled. Poc updated and call light on bedisde

## 2023-02-01 NOTE — ED NOTES
Barry from Lab called with critical result of Hemoglobin change from 1/27 13.9 to today of 10.7. Critical lab result read back to Barry.   Dr. aceves notified of critical lab result at 0326.  Critical lab result read back by Dr. Aceves. Will repeat lab work

## 2023-02-01 NOTE — ED NOTES
Pt returned from CT in great pain. Able to give urine sample, however. ERP aware and pt placed back on monitor. Urine sent

## 2023-02-23 ENCOUNTER — DOCUMENTATION (OUTPATIENT)
Dept: VASCULAR LAB | Facility: MEDICAL CENTER | Age: 68
End: 2023-02-23
Payer: MEDICARE

## 2023-03-27 ENCOUNTER — APPOINTMENT (OUTPATIENT)
Dept: VASCULAR LAB | Facility: MEDICAL CENTER | Age: 68
End: 2023-03-27
Attending: INTERNAL MEDICINE
Payer: MEDICARE

## 2023-03-27 NOTE — PROGRESS NOTES
[facility] - FollowUp Visit  Date of Service: 03/27/23    Adrianne Martinez is here for follow up of dyslipidemia.    Subjective    HPI  Pertinent Interval History since last visit:   Patient was recently admitted to [facility] ED for severe abdominal pain. Imaging pointed to SBO vs ischemic bowel. Patient's pain resolved and she was discharged without surgical intervention. Dr. Badillo requesting patient follow-up outpatient with him in clinic closely to watch for ischemic bowel changes. Additionally patient is recently COVID+. At last visit she had not yet picked up her praluent to assess cost and had not had a recent lipid panel.   Current Prescription Lipid Lowering Medications - including dose:   Statin: None  Non-Statin: Praluent 75 mg q14 days  Current Lipid Lowering and Related Supplements:   Coenzyme Q10, Omega-3 1000 mg qAM  Any Current Side Effects Potentially Related to Lipid Lowering therapy?   No  Current Adherence to Lipid Lowering Therapies:  Complete  Any Previous History of Statin Intolerance?   Yes, Details: Lipitor causing severe myalgias   Baseline Lipids Prior to Treatment:   Latest Reference Range & Units 10/19/22 09:50   Cholesterol,Tot 100 - 199 mg/dL 301 (H)   Triglycerides 0 - 149 mg/dL 175 (H)   HDL >=40 mg/dL 62   LDL <100 mg/dL 204 (H)       SOCIAL HISTORY  Social History     Tobacco Use   Smoking Status Never   Smokeless Tobacco Never      Change in weight: Stable  Exercise habits: moderate regular exercise program   Diet: common adult      Objective    There were no vitals filed for this visit.   Physical Exam    DATA REVIEW  Most Recent Lipid Panel:   Lab Results   Component Value Date    CHOLSTRLTOT 301 (H) 10/19/2022    TRIGLYCERIDE 175 (H) 10/19/2022    HDL 62 10/19/2022     (H) 10/19/2022       Other Pertinent Blood Work:   Lab Results   Component Value Date    SODIUM 140 02/01/2023    POTASSIUM 3.2 (L) 02/01/2023    CHLORIDE 108 02/01/2023    CO2 19 (L) 02/01/2023     ANION 13.0 2023    GLUCOSE 124 (H) 2023    BUN 17 2023    CREATININE 0.42 (L) 2023    CALCIUM 8.2 (L) 2023    ASTSGOT 21 2023    ALTSGPT 15 2023    ALKPHOSPHAT 67 2023    TBILIRUBIN <0.2 2023    ALBUMIN 4.0 2023    AGRATIO 1.7 2023    CREACTPROT 1.57 (H) 2021    TSHULTRASEN 0.260 (L) 10/19/2022       Other:  NA    Recent Imaging Studies:    None since last visit        ASSESSMENT AND PLAN  Patient Type, check all that apply:   Primary Prevention  Established Atherosclerotic Cardiovascular Disease (ASCVD)  No, plaque of right ICA on  vascular screening, but reported negative coronary calcium score in .  Other Established (non-atherosclerotic) Vascular Disease, if Present:    None  Evidence of Heterozygous Familial Hypercholesterolemia (FH):   Likely, patient's mother has had early age diagnosis of hypercholesterinemia, patient's brother  early from cardiac event, and large maternal family cardiac history. Patient has had LDL >190 approximately 5 years per chart review.    ACC/AHA Indication for Statin Therapy, gold all that apply:   LDL-C at baseline >190 mg/dl: Indication for High intensity statin    Calculated Risk for ASCVD, if applicable:  6.9 %  Other Significant Risk Markers, if any, gold all that apply:  Family history of premature ASCVD in first degree relative  National Lipid Association (NLA) Goal (if applicable):  LDL-C:   <100 mg/dL  Lifestyle Recommendations From Today’s Visit:   Eating Plan: Concentrate on  Low sat/Trans fat  Exercise: Continue moderate exercise as able  Statin Recommendations from Today's Visit  None, statin intolerance, not willing to re-trial today  Non-Statin Medications Recommendations from Today’s Visit:   Continue Praluent 75 mg q14 days  Indication for PCSK9 Inhibitor, if applicable:  FH with suboptimal control of LDL-C despite maximally tolerated statin  Supplements Recommended at this  visit:  Continue CoQ10 and fish oil supplements   Recommendations for Other Cardiovascular Risk Factors, gold all that apply:   N/a  Other Issues:  N/a    Studies Ordered at Todays Visit:  None  Blood Work Ordered At Today’s visit:   CMP, Lipid panel, requisitions printed  Follow-Up:   2 months    Betty Resendiz, PharmD    CC:  Tiffany R Barnett, M.D. Michael Bloch, MD Jason Dent, PharmD

## 2023-03-29 ENCOUNTER — TELEPHONE (OUTPATIENT)
Dept: HEALTH INFORMATION MANAGEMENT | Facility: OTHER | Age: 68
End: 2023-03-29
Payer: MEDICARE

## 2023-04-25 ENCOUNTER — APPOINTMENT (OUTPATIENT)
Dept: VASCULAR LAB | Facility: MEDICAL CENTER | Age: 68
End: 2023-04-25
Payer: MEDICARE

## 2023-05-02 ENCOUNTER — OFFICE VISIT (OUTPATIENT)
Dept: VASCULAR SURGERY | Facility: MEDICAL CENTER | Age: 68
End: 2023-05-02
Payer: MEDICARE

## 2023-05-02 VITALS
OXYGEN SATURATION: 94 % | HEIGHT: 61 IN | WEIGHT: 102.3 LBS | TEMPERATURE: 98.1 F | SYSTOLIC BLOOD PRESSURE: 112 MMHG | HEART RATE: 93 BPM | DIASTOLIC BLOOD PRESSURE: 62 MMHG | BODY MASS INDEX: 19.31 KG/M2

## 2023-05-02 DIAGNOSIS — K55.1 SUPERIOR MESENTERIC ARTERY STENOSIS (HCC): ICD-10-CM

## 2023-05-02 PROCEDURE — 99213 OFFICE O/P EST LOW 20 MIN: CPT | Performed by: SURGERY

## 2023-05-02 ASSESSMENT — FIBROSIS 4 INDEX: FIB4 SCORE: 1.3

## 2023-05-02 NOTE — PROGRESS NOTES
"      Vascular Surgery  Established Patient Evaluation    Patient:Adrianne Martinez  MRN:[id number]    Date: 5/2/2023    Primary care physician:Danielle Blas M.D.    Vascular Consultant: Ye Badillo MD    Chief Complaint:     Celiac and SMA stenosis    History of Present Illness:   Adiranne Martinez  is a 67 y.o. year old female who presented to the ER on February 1, 2023 for acute severe diffuse abdominal pain.  CT scan at that time showed evidence of a small bowel obstruction which could have been related to postsurgical adhesions from a remote hysterectomy.  However the CT scan also showed evidence of celiac and SMA stenosis in the moderate range and the exact nature of the stenosis is unclear given that she does not have any evidence of atherosclerosis anywhere else on her scan.  This raise the possibility of inflammatory etiology for the mesenteric stenosis, however sed rate and CRP were normal at the time.  Patient presents today for a clinical follow-up visit.  She reports she has not been having abdominal pain or nausea or vomiting or trouble with appetite or bowel movements.  However after further discussion it turns out she has been noticing that she has been eating smaller meals to avoid getting abdominal pain.  She has not had any weight loss.    Past Medical History:     Past Medical History:   Diagnosis Date    Anesthesia     \"heart stopped during colonoscopy\"     Bowel habit changes     constipation     Breast cancer (HCC)     left    Cancer (HCC) 2017    left breast    Cancer (HCC)     Breast     Cataract     bilat IOL    Chronic pain     Chronic pelvic pain in female     Colon polyp     ENDOMETRIOSIS     Endometriosis     Family history of colon cancer     Gynecological disorder     Heart burn     Hepatitis     as a child, does not know what kind \"was very sick, then I recovered\"     Hyperlipidemia     Indigestion     Insomnia     Jaundice 1967     r/t hepatitis     Pain     right hip, neck, back  "    Pain     Pneumonia 2006    Postmenopausal HRT (hormone replacement therapy)     Psychiatric problem     depression     Recurrent UTI     Seizure (HCC)     last seizure 8/2016 (only had 2 seizures did not follow up with neuro, no meds)     Snoring     Urine frequency      Past Surgical History:     Past Surgical History:   Procedure Laterality Date    BREAST BIOPSY Left 2/16/2017    Procedure: BREAST BIOPSY-RE EXCISION FOR MARGINS ;  Surgeon: Adriano Baird M.D.;  Location: SURGERY [facility];  Service:     MASTECTOMY Left 2/8/2017    Procedure: MASTECTOMY - PARTIAL PLACEMENT OF CAVITY EVALUATION DEVICE;  Surgeon: Adriano Baird M.D.;  Location: SURGERY [facility];  Service:     NODE BIOPSY SENTINEL  2/8/2017    Procedure: NODE BIOPSY SENTINEL;  Surgeon: Adriano Baird M.D.;  Location: SURGERY [facility];  Service:     LUMPECTOMY Left Feb 2017    LUMPECTOMY  2007    Left - benign    ABDOMINAL HYSTERECTOMY TOTAL  1999    for endometriosis    OVARIAN CYSTECTOMY  1985    OTHER ORTHOPEDIC SURGERY  1964    ORIF L elbow compound fx    BREAST BIOPSY      left breast    BREAST BIOPSY      left breast biopsy 2008 (benign)    CATARACT EXTRACTION WITH IOL Bilateral     LAPAROSCOPY  [id number]    multiple for endometriosis     Allergies:     Allergies   Allergen Reactions    Meperidine Hives     6/28/2021 Pt unable to verify reaction/allergy    Oxycontin [Oxycodone Hcl] Vomiting     Able to take vicodin.  6/28/2021 Pt unable to verify reaction/allergy    Statins [Hmg-Coa-R Inhibitors] Diarrhea and Nausea     6/28/2021 Pt unable to verify reaction/allergy    Cherry Rash     Raw cherries     Latex Rash and Itching          Nsaids Rash           Medications:     Outpatient Encounter Medications as of 5/2/2023   Medication Sig Dispense Refill    zolpidem (AMBIEN) 10 MG Tab Take 10 mg by mouth at bedtime as needed for Sleep.      Cholecalciferol (VITAMIN D3) 50 MCG (2000 UT) Tab Take 2,000 Units by mouth every  morning.      Alirocumab (PRALUENT) 75 MG/ML Solution Auto-injector Inject 75 mg under the skin every 14 days. 2 mL 3    hydrOXYzine HCl (ATARAX) 25 MG Tab Take 1 Tablet by mouth 3 times a day as needed for Itching. 30 Tablet 0    CALCIUM PO Take 300 mg by mouth every morning.      Cyanocobalamin (B-12) 5000 MCG Cap Take 5,000 mcg by mouth every morning.      NALTREXONE HCL PO Take 3 mg by mouth at bedtime.      multivitamin (THERAGRAN) Tab Take 1 tablet by mouth every morning.      DULoxetine (CYMBALTA) 60 MG Cap DR Particles delayed-release capsule Take 60 mg by mouth at bedtime.      estradiol (ESTRACE) 0.1 MG/GM vaginal cream Insert 1 Applicatorful into the vagina two times a week.      Thyroid 97.5 MG Tab Take 48.75 mg by mouth every morning. 1/2 tablet = 48.75 mg.  Indications: titrating dose managed by Dr. Black COSTA PO Take 1,000 mg by mouth every morning.      Coenzyme Q10 (COQ10) 100 MG Cap Take 100 mg by mouth every morning.      pregabalin (LYRICA) 25 MG Cap Take 50 mg by mouth at bedtime. 2 to 3 capsules = 50 to 75 mg.      Ascorbic Acid (VITAMIN C) 1000 MG Tab Take 1,000 mg by mouth every evening.      Omega-3 Fatty Acids (FISH OIL) 1000 MG Cap capsule Take 1,000 mg by mouth every morning.      tizanidine (ZANAFLEX) 4 MG Tab Take 4 mg by mouth at bedtime. Indications: Musculoskeletal Pain       No facility-administered encounter medications on file as of 5/2/2023.     Social History:     Social History     Socioeconomic History    Marital status:      Spouse name: Not on file    Number of children: 2    Years of education: Not on file    Highest education level: Not on file   Occupational History    Not on file   Tobacco Use    Smoking status: Never    Smokeless tobacco: Never   Substance and Sexual Activity    Alcohol use: Yes     Alcohol/week: 0.6 oz     Types: 1 Glasses of wine per week     Comment: 1-3 per week     Drug use: No    Sexual activity: Yes     Partners: Male   Other  "Topics Concern    Not on file   Social History Narrative    ** Merged History Encounter **         Was previously an  - didn't like her job - didn't like conflict.       Social Determinants of Health     Financial Resource Strain: Not on file   Food Insecurity: Not on file   Transportation Needs: Not on file   Physical Activity: Not on file   Stress: Not on file   Social Connections: Not on file   Intimate Partner Violence: Not on file   Housing Stability: Not on file      Social History     Tobacco Use   Smoking Status Never   Smokeless Tobacco Never     Social History     Substance and Sexual Activity   Alcohol Use Yes    Alcohol/week: 0.6 oz    Types: 1 Glasses of wine per week    Comment: 1-3 per week      Social History     Substance and Sexual Activity   Drug Use No      Family History:     Family History   Problem Relation Age of Onset    Cancer Mother         breast cancer    Cancer Father         colon, throat, and skin     Heart Attack Brother 50       Review of Systems:   Constitutional: Negative for fever or chills  HENT:   Negative for hearing loss or tinnitus    Eyes:    Negative for blurred vision or loss of vision  Respiratory:  Negative for cough or hemoptysis  Cardiac:  Negative for chest pain or palpitations  Vascular:  Negative for claudication, Negative for rest pain  Gastrointestinal: Negative for vomiting or abdominal pain     Negative for hematochezia or melena   Genitourinary: Negative for dysuria or hematuria   Musculoskeletal: Negative for myalgias or acute joint pain  Skin:   Negative for itching or rash  Neurological:  Negative for dizziness or headaches     Negative for speech disturbance     Negative for extremity weakness or paresthesias  Endo/Heme:  Negative for easy bruising or bleeding         Exam:   /62 (BP Location: Left arm, Patient Position: Sitting, BP Cuff Size: Adult)   Pulse 93   Temp 36.7 °C (98.1 °F) (Temporal)   Ht 1.549 m (5' 1\")   Wt 46.4 kg " (102 lb 4.8 oz)   SpO2 94%   BMI 19.33 kg/m²     Constitutional: Alert, oriented, no acute distress  HEENT:  Normocephalic and atraumatic, EOMI  Neck:   Supple, no JVD,   Cardiovascular: Regular rate and rhythm,   Pulmonary:  Good air entry bilaterally,    Abdominal:  Soft, non-tender, non-distended       Musculoskeletal: No tenderness, no deformity  Neurological:  CN II-XII grossly intact, no focal deficits  Skin:   Skin is warm and dry. No rash noted.  Vascular exam:    RUE: warm, cap refill<3 seconds, no edema, no tissue loss,   LUE: warm, cap refill<3 seconds, no edema, no tissue loss,   RLE: warm, cap refill<3 seconds, no edema, no tissue loss,   LLE: warm, cap refill<3 seconds, no edema, no tissue loss,         Imaging:   CT scan also showed evidence of celiac and SMA stenosis in the moderate range and the exact nature of the stenosis is unclear given that she does not have any evidence of atherosclerosis anywhere else on her scan.       Assessment and Plan:   -Moderate celiac artery stenosis  -Moderate SMA stenosis    Plan at this point will be to get a dedicated mesenteric arterial duplex to further evaluate the celiac and SMA.  Subsequent recommendations will be based on the measurements taken on the duplex.  If the duplex is equivocal we will need to repeat the CT angiogram to follow-up on the condition of the celiac and SMA.      Ye Badillo MD  [facility]  [phone]  [address], [place] [address]

## 2023-06-08 ENCOUNTER — APPOINTMENT (OUTPATIENT)
Dept: RADIOLOGY | Facility: MEDICAL CENTER | Age: 68
End: 2023-06-08
Attending: SURGERY
Payer: MEDICARE

## 2023-06-08 ENCOUNTER — APPOINTMENT (OUTPATIENT)
Dept: RADIOLOGY | Facility: MEDICAL CENTER | Age: 68
End: 2023-06-08
Attending: INTERNAL MEDICINE
Payer: MEDICARE

## 2023-06-08 DIAGNOSIS — Z12.31 VISIT FOR SCREENING MAMMOGRAM: ICD-10-CM

## 2023-06-08 DIAGNOSIS — K55.1 SUPERIOR MESENTERIC ARTERY STENOSIS (HCC): ICD-10-CM

## 2023-06-08 PROCEDURE — 93976 VASCULAR STUDY: CPT

## 2023-06-08 PROCEDURE — 77063 BREAST TOMOSYNTHESIS BI: CPT

## 2023-08-17 ENCOUNTER — APPOINTMENT (OUTPATIENT)
Dept: INTERNAL MEDICINE | Facility: IMAGING CENTER | Age: 68
End: 2023-08-17
Payer: MEDICARE

## 2023-08-18 ENCOUNTER — OFFICE VISIT (OUTPATIENT)
Dept: INTERNAL MEDICINE | Facility: IMAGING CENTER | Age: 68
End: 2023-08-18
Payer: MEDICARE

## 2023-08-18 VITALS
WEIGHT: 101.4 LBS | HEIGHT: 62 IN | SYSTOLIC BLOOD PRESSURE: 104 MMHG | DIASTOLIC BLOOD PRESSURE: 60 MMHG | OXYGEN SATURATION: 95 % | HEART RATE: 99 BPM | RESPIRATION RATE: 17 BRPM | BODY MASS INDEX: 18.66 KG/M2 | TEMPERATURE: 98.1 F

## 2023-08-18 DIAGNOSIS — F11.20 CHRONIC NARCOTIC DEPENDENCE (HCC): ICD-10-CM

## 2023-08-18 DIAGNOSIS — Z00.00 HEALTH CARE MAINTENANCE: ICD-10-CM

## 2023-08-18 DIAGNOSIS — N95.1 POST MENOPAUSAL SYNDROME: Chronic | ICD-10-CM

## 2023-08-18 DIAGNOSIS — Z12.11 SCREENING FOR COLORECTAL CANCER: ICD-10-CM

## 2023-08-18 DIAGNOSIS — I77.1 CELIAC ARTERY STENOSIS (HCC): ICD-10-CM

## 2023-08-18 DIAGNOSIS — M19.049 HAND ARTHRITIS: ICD-10-CM

## 2023-08-18 DIAGNOSIS — K55.1 SUPERIOR MESENTERIC ARTERY STENOSIS (HCC): ICD-10-CM

## 2023-08-18 DIAGNOSIS — F51.01 PRIMARY INSOMNIA: Chronic | ICD-10-CM

## 2023-08-18 DIAGNOSIS — Z12.12 SCREENING FOR COLORECTAL CANCER: ICD-10-CM

## 2023-08-18 PROBLEM — I77.4 CELIAC ARTERY STENOSIS (HCC): Status: ACTIVE | Noted: 2023-08-18

## 2023-08-18 PROCEDURE — 99214 OFFICE O/P EST MOD 30 MIN: CPT | Performed by: FAMILY MEDICINE

## 2023-08-18 PROCEDURE — 3078F DIAST BP <80 MM HG: CPT | Performed by: FAMILY MEDICINE

## 2023-08-18 PROCEDURE — 3074F SYST BP LT 130 MM HG: CPT | Performed by: FAMILY MEDICINE

## 2023-08-18 RX ORDER — ZOLPIDEM TARTRATE 10 MG/1
10 TABLET ORAL NIGHTLY PRN
Qty: 90 TABLET | Refills: 0 | Status: SHIPPED | OUTPATIENT
Start: 2023-08-18 | End: 2023-11-16

## 2023-08-18 RX ORDER — ESTRADIOL 0.1 MG/G
1 CREAM VAGINAL
Qty: 42.5 G | Refills: 3 | Status: SHIPPED | OUTPATIENT
Start: 2023-08-18

## 2023-08-18 ASSESSMENT — FIBROSIS 4 INDEX: FIB4 SCORE: 1.32

## 2023-08-18 ASSESSMENT — PATIENT HEALTH QUESTIONNAIRE - PHQ9: CLINICAL INTERPRETATION OF PHQ2 SCORE: 0

## 2023-12-04 DIAGNOSIS — R05.2 SUBACUTE COUGH: ICD-10-CM

## 2023-12-04 RX ORDER — MONTELUKAST SODIUM 10 MG/1
10 TABLET ORAL DAILY
Qty: 14 TABLET | Refills: 1 | Status: SHIPPED | OUTPATIENT
Start: 2023-12-04 | End: 2023-12-18

## 2024-01-19 ENCOUNTER — OFFICE VISIT (OUTPATIENT)
Dept: INTERNAL MEDICINE | Facility: IMAGING CENTER | Age: 69
End: 2024-01-19
Payer: COMMERCIAL

## 2024-01-19 VITALS
DIASTOLIC BLOOD PRESSURE: 62 MMHG | OXYGEN SATURATION: 99 % | HEART RATE: 94 BPM | RESPIRATION RATE: 16 BRPM | TEMPERATURE: 97.9 F | SYSTOLIC BLOOD PRESSURE: 122 MMHG | HEIGHT: 62 IN | WEIGHT: 101.41 LBS | BODY MASS INDEX: 18.66 KG/M2

## 2024-01-19 DIAGNOSIS — R22.31 AXILLARY MASS, RIGHT: ICD-10-CM

## 2024-01-19 PROCEDURE — 3074F SYST BP LT 130 MM HG: CPT | Performed by: FAMILY MEDICINE

## 2024-01-19 PROCEDURE — 99213 OFFICE O/P EST LOW 20 MIN: CPT | Performed by: FAMILY MEDICINE

## 2024-01-19 PROCEDURE — 3078F DIAST BP <80 MM HG: CPT | Performed by: FAMILY MEDICINE

## 2024-01-19 ASSESSMENT — FIBROSIS 4 INDEX: FIB4 SCORE: 1.32

## 2024-01-19 NOTE — PROGRESS NOTES
"Chief Complaint   Patient presents with    Mass     Right axilla mass present x3-4 days. Lump is painful.       HPI:  Patient is a 68 y.o. female established patient who presents today for evaluation of new right axilla mass noted three to four days ago while she was showering. Mass is palpable when she raises her right arm overhead and is tender to touch. Patient denies associated acute illness symptoms and denies prior right breast/axilla surgery. She is due for annual fasting labs and Medicare Wellness visit with me when able.     Patient Active Problem List    Diagnosis Date Noted    Superior mesenteric artery stenosis (HCC) 08/18/2023    Celiac artery stenosis (HCC) 08/18/2023    Hand arthritis 08/18/2023    Body mass index (BMI) 19.9 or less, adult 06/29/2018    Mixed dyslipidemia 02/12/2018    Post menopausal syndrome 02/12/2018    Vitamin D deficiency 02/12/2018    Chronic right hip pain 05/21/2017    History of breast cancer 05/08/2017    Chronic narcotic dependence (HCC) 05/05/2017    Diverticulosis of large intestine without hemorrhage, per 12/23/16 COLONOSCOPY 01/14/2017    Adenomatous polyp of colon, per 12/23/16 COLONOSCOPY 01/14/2017    Atherosclerosis of right carotid artery, mild 12/15/2016    History of opthalmic migraine 07/12/2016    Frozen shoulder 03/08/2016    Irritable bowel syndrome (IBS) 01/19/2016    Family history of colon cancer     Chronic pain 08/13/2013    Primary insomnia     Chronic pelvic pain in female        Past medical, surgical, family, and social history was reviewed and updated in Epic chart by me today.     Medications and allergies reviewed and updated in Epic chart by me today.     ROS:  Pertinent positives listed above in HPI. All other systems have been reviewed and are negative.    PE:   /62 (BP Location: Left arm, Patient Position: Sitting, BP Cuff Size: Adult)   Pulse 94   Temp 36.6 °C (97.9 °F) (Temporal)   Resp 16   Ht 1.575 m (5' 2\")   Wt 46 kg (101 lb " 6.6 oz)   SpO2 99%   BMI 18.55 kg/m²   Vital signs reviewed with patient.     Gen: Well developed; well nourished; no acute distress; non toxic appearance   R axilla: hard mass palpated in mid axilla when she raises arm overhead; mass is tender to touch  Skin: Warm and dry; no rashes noted   Neuro: No focal deficits noted   Psych: AAOx4; mood and affect are appropriate    A/P:  1. Axillary mass, right  New tender mass noted in mid right axilla present for the past 3-4 days as described above in HPI. Recommend patient proceed with imaging, and I spent an extensive amount of time discussing proper imaging tests with radiology team that Medicare will approve. Patient will be contacted directly for scheduling assistance, and I will follow up accordingly.   - US-BREAST LIMITED-RIGHT; Future  - MA-DIAGNOSTIC MAMMO RIGHT W/TOMOSYNTHESIS W/CAD; Future

## 2024-01-22 ENCOUNTER — HOSPITAL ENCOUNTER (OUTPATIENT)
Dept: RADIOLOGY | Facility: MEDICAL CENTER | Age: 69
End: 2024-01-22
Attending: FAMILY MEDICINE
Payer: COMMERCIAL

## 2024-01-22 DIAGNOSIS — N64.59 SIGN AND SYMPTOM IN BREAST: ICD-10-CM

## 2024-01-22 DIAGNOSIS — R22.31 AXILLARY MASS, RIGHT: ICD-10-CM

## 2024-01-22 PROCEDURE — 76642 ULTRASOUND BREAST LIMITED: CPT | Mod: RT

## 2024-01-26 DIAGNOSIS — R73.9 HYPERGLYCEMIA: ICD-10-CM

## 2024-01-26 DIAGNOSIS — Z00.00 HEALTH CARE MAINTENANCE: ICD-10-CM

## 2024-01-26 DIAGNOSIS — R53.83 OTHER FATIGUE: ICD-10-CM

## 2024-01-26 DIAGNOSIS — E78.2 MIXED DYSLIPIDEMIA: Chronic | ICD-10-CM

## 2024-01-26 DIAGNOSIS — D75.89 MACROCYTOSIS: ICD-10-CM

## 2024-01-26 DIAGNOSIS — E55.9 VITAMIN D DEFICIENCY: ICD-10-CM

## 2024-01-26 DIAGNOSIS — N95.1 POST MENOPAUSAL SYNDROME: Chronic | ICD-10-CM

## 2024-01-29 ENCOUNTER — NON-PROVIDER VISIT (OUTPATIENT)
Dept: INTERNAL MEDICINE | Facility: IMAGING CENTER | Age: 69
End: 2024-01-29
Payer: MEDICARE

## 2024-01-29 ENCOUNTER — HOSPITAL ENCOUNTER (OUTPATIENT)
Facility: MEDICAL CENTER | Age: 69
End: 2024-01-29
Attending: FAMILY MEDICINE
Payer: MEDICARE

## 2024-01-29 DIAGNOSIS — R73.9 HYPERGLYCEMIA: ICD-10-CM

## 2024-01-29 DIAGNOSIS — Z00.00 HEALTH CARE MAINTENANCE: ICD-10-CM

## 2024-01-29 DIAGNOSIS — E78.2 MIXED DYSLIPIDEMIA: Chronic | ICD-10-CM

## 2024-01-29 DIAGNOSIS — D75.89 MACROCYTOSIS: ICD-10-CM

## 2024-01-29 DIAGNOSIS — E55.9 VITAMIN D DEFICIENCY: ICD-10-CM

## 2024-01-29 DIAGNOSIS — R53.83 OTHER FATIGUE: ICD-10-CM

## 2024-01-29 LAB
25(OH)D3 SERPL-MCNC: 60 NG/ML (ref 30–100)
ALBUMIN SERPL BCP-MCNC: 3.6 G/DL (ref 3.2–4.9)
ALBUMIN/GLOB SERPL: 0.9 G/DL
ALP SERPL-CCNC: 105 U/L (ref 30–99)
ALT SERPL-CCNC: 18 U/L (ref 2–50)
ANION GAP SERPL CALC-SCNC: 14 MMOL/L (ref 7–16)
AST SERPL-CCNC: 23 U/L (ref 12–45)
BASOPHILS # BLD AUTO: 1.5 % (ref 0–1.8)
BASOPHILS # BLD: 0.08 K/UL (ref 0–0.12)
BILIRUB SERPL-MCNC: 0.3 MG/DL (ref 0.1–1.5)
BUN SERPL-MCNC: 16 MG/DL (ref 8–22)
CALCIUM ALBUM COR SERPL-MCNC: 10.2 MG/DL (ref 8.5–10.5)
CALCIUM SERPL-MCNC: 9.9 MG/DL (ref 8.5–10.5)
CHLORIDE SERPL-SCNC: 102 MMOL/L (ref 96–112)
CHOLEST SERPL-MCNC: 351 MG/DL (ref 100–199)
CO2 SERPL-SCNC: 24 MMOL/L (ref 20–33)
CREAT SERPL-MCNC: 0.59 MG/DL (ref 0.5–1.4)
EOSINOPHIL # BLD AUTO: 0.6 K/UL (ref 0–0.51)
EOSINOPHIL NFR BLD: 11.6 % (ref 0–6.9)
ERYTHROCYTE [DISTWIDTH] IN BLOOD BY AUTOMATED COUNT: 46.6 FL (ref 35.9–50)
FOLATE SERPL-MCNC: 12 NG/ML
GFR SERPLBLD CREATININE-BSD FMLA CKD-EPI: 99 ML/MIN/1.73 M 2
GLOBULIN SER CALC-MCNC: 4 G/DL (ref 1.9–3.5)
GLUCOSE SERPL-MCNC: 76 MG/DL (ref 65–99)
HCT VFR BLD AUTO: 43 % (ref 37–47)
HDLC SERPL-MCNC: 64 MG/DL
HGB BLD-MCNC: 13.9 G/DL (ref 12–16)
IMM GRANULOCYTES # BLD AUTO: 0.01 K/UL (ref 0–0.11)
IMM GRANULOCYTES NFR BLD AUTO: 0.2 % (ref 0–0.9)
LDLC SERPL CALC-MCNC: 257 MG/DL
LYMPHOCYTES # BLD AUTO: 1.38 K/UL (ref 1–4.8)
LYMPHOCYTES NFR BLD: 26.7 % (ref 22–41)
MCH RBC QN AUTO: 29.6 PG (ref 27–33)
MCHC RBC AUTO-ENTMCNC: 32.3 G/DL (ref 32.2–35.5)
MCV RBC AUTO: 91.7 FL (ref 81.4–97.8)
MONOCYTES # BLD AUTO: 0.4 K/UL (ref 0–0.85)
MONOCYTES NFR BLD AUTO: 7.7 % (ref 0–13.4)
NEUTROPHILS # BLD AUTO: 2.7 K/UL (ref 1.82–7.42)
NEUTROPHILS NFR BLD: 52.3 % (ref 44–72)
NRBC # BLD AUTO: 0 K/UL
NRBC BLD-RTO: 0 /100 WBC (ref 0–0.2)
PLATELET # BLD AUTO: 324 K/UL (ref 164–446)
PMV BLD AUTO: 9.8 FL (ref 9–12.9)
POTASSIUM SERPL-SCNC: 4.1 MMOL/L (ref 3.6–5.5)
PROT SERPL-MCNC: 7.6 G/DL (ref 6–8.2)
RBC # BLD AUTO: 4.69 M/UL (ref 4.2–5.4)
SODIUM SERPL-SCNC: 140 MMOL/L (ref 135–145)
T4 FREE SERPL-MCNC: 0.99 NG/DL (ref 0.93–1.7)
TRIGL SERPL-MCNC: 151 MG/DL (ref 0–149)
TSH SERPL DL<=0.005 MIU/L-ACNC: 1.14 UIU/ML (ref 0.38–5.33)
VIT B12 SERPL-MCNC: 3287 PG/ML (ref 211–911)
WBC # BLD AUTO: 5.2 K/UL (ref 4.8–10.8)

## 2024-01-29 PROCEDURE — 84443 ASSAY THYROID STIM HORMONE: CPT

## 2024-01-29 PROCEDURE — 85025 COMPLETE CBC W/AUTO DIFF WBC: CPT

## 2024-01-29 PROCEDURE — 84439 ASSAY OF FREE THYROXINE: CPT

## 2024-01-29 PROCEDURE — 82607 VITAMIN B-12: CPT

## 2024-01-29 PROCEDURE — 82306 VITAMIN D 25 HYDROXY: CPT

## 2024-01-29 PROCEDURE — 80061 LIPID PANEL: CPT

## 2024-01-29 PROCEDURE — 99999 PR NO CHARGE: CPT

## 2024-01-29 PROCEDURE — 83036 HEMOGLOBIN GLYCOSYLATED A1C: CPT | Mod: GA

## 2024-01-29 PROCEDURE — 80053 COMPREHEN METABOLIC PANEL: CPT

## 2024-01-29 PROCEDURE — 82746 ASSAY OF FOLIC ACID SERUM: CPT

## 2024-01-30 LAB
EST. AVERAGE GLUCOSE BLD GHB EST-MCNC: 114 MG/DL
HBA1C MFR BLD: 5.6 % (ref 4–5.6)

## 2024-02-09 ENCOUNTER — OFFICE VISIT (OUTPATIENT)
Dept: INTERNAL MEDICINE | Facility: IMAGING CENTER | Age: 69
End: 2024-02-09
Payer: MEDICARE

## 2024-02-09 VITALS
WEIGHT: 105.6 LBS | RESPIRATION RATE: 17 BRPM | HEART RATE: 87 BPM | BODY MASS INDEX: 19.43 KG/M2 | SYSTOLIC BLOOD PRESSURE: 112 MMHG | TEMPERATURE: 98.3 F | HEIGHT: 62 IN | OXYGEN SATURATION: 95 % | DIASTOLIC BLOOD PRESSURE: 66 MMHG

## 2024-02-09 DIAGNOSIS — E55.9 VITAMIN D DEFICIENCY: Chronic | ICD-10-CM

## 2024-02-09 DIAGNOSIS — Z91.81 RISK FOR FALLS: ICD-10-CM

## 2024-02-09 DIAGNOSIS — F51.01 PRIMARY INSOMNIA: Chronic | ICD-10-CM

## 2024-02-09 DIAGNOSIS — I77.1 CELIAC ARTERY STENOSIS (HCC): ICD-10-CM

## 2024-02-09 DIAGNOSIS — R13.19 OTHER DYSPHAGIA: ICD-10-CM

## 2024-02-09 DIAGNOSIS — K55.1 SUPERIOR MESENTERIC ARTERY STENOSIS (HCC): ICD-10-CM

## 2024-02-09 DIAGNOSIS — Z00.00 ENCOUNTER FOR MEDICARE ANNUAL WELLNESS EXAM: ICD-10-CM

## 2024-02-09 DIAGNOSIS — F11.20 CHRONIC NARCOTIC DEPENDENCE (HCC): Chronic | ICD-10-CM

## 2024-02-09 DIAGNOSIS — E78.2 MIXED DYSLIPIDEMIA: Chronic | ICD-10-CM

## 2024-02-09 DIAGNOSIS — Z12.31 BREAST CANCER SCREENING BY MAMMOGRAM: ICD-10-CM

## 2024-02-09 DIAGNOSIS — Z00.00 HEALTH CARE MAINTENANCE: ICD-10-CM

## 2024-02-09 DIAGNOSIS — N95.1 POST MENOPAUSAL SYNDROME: Chronic | ICD-10-CM

## 2024-02-09 DIAGNOSIS — G89.29 OTHER CHRONIC PAIN: Chronic | ICD-10-CM

## 2024-02-09 DIAGNOSIS — Z12.11 COLON CANCER SCREENING: ICD-10-CM

## 2024-02-09 PROCEDURE — 3074F SYST BP LT 130 MM HG: CPT | Performed by: FAMILY MEDICINE

## 2024-02-09 PROCEDURE — 3078F DIAST BP <80 MM HG: CPT | Performed by: FAMILY MEDICINE

## 2024-02-09 PROCEDURE — G0439 PPPS, SUBSEQ VISIT: HCPCS | Performed by: FAMILY MEDICINE

## 2024-02-09 RX ORDER — ZOLPIDEM TARTRATE 10 MG/1
10 TABLET ORAL NIGHTLY PRN
Qty: 90 TABLET | Refills: 0 | Status: SHIPPED | OUTPATIENT
Start: 2024-02-09 | End: 2024-05-09

## 2024-02-09 ASSESSMENT — ENCOUNTER SYMPTOMS: GENERAL WELL-BEING: FAIR

## 2024-02-09 ASSESSMENT — PATIENT HEALTH QUESTIONNAIRE - PHQ9: CLINICAL INTERPRETATION OF PHQ2 SCORE: 0

## 2024-02-09 ASSESSMENT — FIBROSIS 4 INDEX: FIB4 SCORE: 1.1

## 2024-02-09 ASSESSMENT — ACTIVITIES OF DAILY LIVING (ADL): BATHING_REQUIRES_ASSISTANCE: 0

## 2024-02-09 NOTE — PROGRESS NOTES
CC:   Medicare Annual Wellness Visit    HPI:  Adrianne is a 68 y.o. female here for her Medicare Annual Wellness Visit and to review labs done 1/29/24. She continues to lead an active lifestyle and remains busy with her family. She suffers from chronic primary insomnia and uses PRN Ambien for management. She continues to benefit from medication use, denies medication related side effects, and uses this controlled medication in a safe manner. She is overdue for screening colonoscopy and has active referral to [facility] in her chart. She reports ongoing benefits from vaginal Estrace cream use for postmenopausal symptom management (used ok with Dr. Dutta), and she has history of chronic pain with narcotic use managed by Dr. Patricio. She has known superior mesenteric artery stenosis as well as chronic celiac artery stenosis managed by Dr. Badillo. She reports new onset of dysphagia, with both solids and liquids, that has been present for approximately two months. She coughs with most meals as pointed out by her daughter to her recently. She denies triggering event for this condition and is interested in further work up. She also feels more forgetful at times but denies getting lost or not being able to do ADLs appropriately. She denies new mental health concerns, endorses 100% medication compliance, and is in good spirits today.      Patient Active Problem List    Diagnosis Date Noted    Risk for falls 02/09/2024    Superior mesenteric artery stenosis (HCC) 08/18/2023    Celiac artery stenosis (HCC) 08/18/2023    Hand arthritis 08/18/2023    Mixed dyslipidemia 02/12/2018    Post menopausal syndrome 02/12/2018    Vitamin D deficiency 02/12/2018    Chronic right hip pain 05/21/2017    History of breast cancer 05/08/2017    Chronic narcotic dependence (HCC) 05/05/2017    Diverticulosis of large intestine without hemorrhage, per 12/23/16 COLONOSCOPY 01/14/2017    Adenomatous polyp of colon, per 12/23/16 COLONOSCOPY 01/14/2017     No action needed Atherosclerosis of right carotid artery, mild 12/15/2016    History of opthalmic migraine 07/12/2016    Frozen shoulder 03/08/2016    Irritable bowel syndrome (IBS) 01/19/2016    Family history of colon cancer     Chronic pain 08/13/2013    Primary insomnia     Chronic pelvic pain in female      Current Outpatient Medications   Medication Sig Dispense Refill    NALTREXONE HCL PO Take  by mouth.      zolpidem (AMBIEN) 10 MG Tab Take 1 Tablet by mouth at bedtime as needed for Sleep for up to 90 days. 90 Tablet 0    estradiol (ESTRACE) 0.1 MG/GM vaginal cream Insert 1 g into the vagina two times a week. 42.5 g 3    zolpidem (AMBIEN) 10 MG Tab Take 10 mg by mouth at bedtime as needed for Sleep.      CALCIUM PO Take 300 mg by mouth every morning.      Cyanocobalamin (B-12) 5000 MCG Cap Take 5,000 mcg by mouth every morning.      DULoxetine (CYMBALTA) 60 MG Cap DR Particles delayed-release capsule Take 60 mg by mouth at bedtime.      TURMERIC PO Take 1,000 mg by mouth every morning.      Coenzyme Q10 (COQ10) 100 MG Cap Take 100 mg by mouth every morning.      pregabalin (LYRICA) 25 MG Cap Take 50 mg by mouth at bedtime. 2 to 3 capsules = 50 to 75 mg.      Ascorbic Acid (VITAMIN C) 1000 MG Tab Take 1,000 mg by mouth every evening.      Omega-3 Fatty Acids (FISH OIL) 1000 MG Cap capsule Take 1,000 mg by mouth every morning.      tizanidine (ZANAFLEX) 4 MG Tab Take 4 mg by mouth at bedtime. Indications: Musculoskeletal Pain       No current facility-administered medications for this visit.      Current supplements: see MAR   Chronic narcotic pain medicines: no  Allergies: Meperidine, Oxycontin [oxycodone hcl], Statins [hmg-coa-r inhibitors], Cherry, Latex, and Nsaids  Exercise: yes  Current social contact/activities: yes  Current mood: good  Advance Directive on file: no    Screening:  Depression Screening  Little interest or pleasure in doing things?  0 - not at all  Feeling down, depressed , or hopeless? 0 - not at  all  Patient Health Questionnaire Score: 0     If depressive symptoms identified deferred to follow up visit unless specifically addressed in assessment and plan.    Interpretation of PHQ-9 Total Score   Score Severity   1-4 No Depression   5-9 Mild Depression   10-14 Moderate Depression   15-19 Moderately Severe Depression   20-27 Severe Depression    Screening for Cognitive Impairment  Do you or any of your friends or family members have any concern about your memory? Yes  Three Minute Recall (Banana, Sunrise, Chair) 3/3    Devon clock face with all 12 numbers and set the hands to show 20 past 8.  Yes    Cognitive concerns identified deferred for follow up unless specifically addressed in assessment and plan.    Fall Risk Assessment  Has the patient had two or more falls in the last year or any fall with injury in the last year?  Yes    Safety Assessment  Do you always wear your seatbelt?  Yes  Any changes to home needed to function safely? No  Difficulty hearing.  No  Patient counseled about all safety risks that were identified.    Functional Assessment ADLs  Are there any barriers preventing you from cooking for yourself or meeting nutritional needs?  No.    Are there any barriers preventing you from driving safely or obtaining transportation?  No.    Are there any barriers preventing you from using a telephone or calling for help?  No    Are there any barriers preventing you from shopping?  No.    Are there any barriers preventing you from taking care of your own finances?  No    Are there any barriers preventing you from managing your medications?  No    Are there any barriers preventing you from showering, bathing or dressing yourself? No    Are there any barriers preventing you from doing housework or laundry? No  Are there any barriers preventing you from using the toilet?No  Are you currently engaging in any exercise or physical activity?  No.      Self-Assessment of Health  What is your perception of your  health? Fair  Do you sleep more than six hours a night? No  In the past 7 days, how much did pain keep you from doing your normal work? None  Do you spend quality time with family or friends (virtually or in person)? Yes  Do you usually eat a heart healthy diet that constists of a variety of fruits, vegetables, whole grains and fiber? Yes  Do you eat foods high in fat and/or Fast Food more than three times per week? No    Advance Care Planning  Do you have an Advance Directive, Living Will, Durable Power of , or POLST?                   Health Maintenance Summary            Ordered - Colorectal Cancer Screening (Colonoscopy - Every 5 Years) Ordered on 8/18/2023 12/23/2016  REFERRAL TO GI FOR COLONOSCOPY              Overdue - COVID-19 Vaccine (6 - 2023-24 season) Overdue since 9/1/2023      05/10/2023  Imm Admin: PFIZER BIVALENT SARS-COV-2 VACCINE (12+)    10/20/2022  Imm Admin: MODERNA BIVALENT BOOSTER SARS-COV-2 VACCINE (6+)    04/24/2022  Imm Admin: PFIZER PURPLE CAP SARS-COV-2 VACCINATION (12+)    11/16/2021  Imm Admin: PFIZER PURPLE CAP SARS-COV-2 VACCINATION (12+)    04/23/2021  Imm Admin: PFIZER PURPLE CAP SARS-COV-2 VACCINATION (12+)    Only the first 5 history entries have been loaded, but more history exists.              Ordered - Mammogram (Yearly) Ordered on 2/9/2024 06/08/2023  MA-SCREENING MAMMO BILAT W/TOMOSYNTHESIS W/CAD    08/30/2021  MA-SCREENING MAMMO BILAT W/TOMOSYNTHESIS W/CAD    07/17/2020  MA-SCREENING MAMMO BILAT W/TOMOSYNTHESIS W/CAD    04/02/2019  MA-SCREENING MAMMO BILAT W/TOMOSYNTHESIS W/CAD    12/29/2017  MA-MAMMO SCREENING BILAT W/CONSTANTINO W/CAD    Only the first 5 history entries have been loaded, but more history exists.              Annual Wellness Visit (Yearly) Next due on 2/9/2025 02/09/2024  Visit Dx: Encounter for Medicare annual wellness exam    02/09/2024  Subsequent Annual Wellness Visit - Includes PPPS ()              Bone Density Scan (Every 5  Years) Next due on 4/27/2026 04/27/2021  DS-BONE DENSITY STUDY (DEXA)    03/28/2019  DS-BONE DENSITY STUDY (DEXA)    03/20/2017  DS-BONE DENSITY STUDY (DEXA)    09/14/2009  DS-BONE DENSITY STUDY (DEXA)              IMM DTaP/Tdap/Td Vaccine (3 - Td or Tdap) Next due on 9/24/2029 09/24/2019  Imm Admin: Tdap Vaccine    10/29/2013  Imm Admin: Tdap Vaccine              Hepatitis C Screening  Addressed      06/11/2019  Hepatitis C Antibody component of HEP C VIRUS ANTIBODY    06/11/2019  Reason not specified              Zoster (Shingles) Vaccines (Series Information) Completed      12/03/2019  Imm Admin: Zoster Vaccine Recombinant (RZV) (SHINGRIX)    09/20/2019  Imm Admin: Zoster Vaccine Recombinant (RZV) (SHINGRIX)    07/31/2015  Imm Admin: Zoster Vaccine Live (ZVL) (Zostavax) - HISTORICAL DATA              Hepatitis A Vaccine (Hep A) (Series Information) Aged Out      05/12/2021  Imm Admin: Hep A/HEP B Combined Vaccine (TwinRix)    09/20/2019  Imm Admin: Hep A/HEP B Combined Vaccine (TwinRix)    02/08/2016  Imm Admin: Hep A/HEP B Combined Vaccine (TwinRix)              Pneumococcal Vaccine: 65+ Years (Series Information) Completed      10/31/2022  Imm Admin: Pneumococcal Conjugate Vaccine (PCV20)    10/01/2016  Imm Admin: Pneumococcal Vaccine (PCV7) - HISTORICAL DATA              Influenza Vaccine (Series Information) Completed      09/25/2023  Imm Admin: Influenza Vaccine Adult HD    09/29/2022  Imm Admin: Influenza Vaccine Adult HD    11/16/2021  Imm Admin: Influenza Vaccine Quad Inj (Pf)    09/06/2020  Imm Admin: Influenza Vaccine Quad Recombinant    09/24/2019  Imm Admin: Influenza Vaccine Quad Inj (Pf)    Only the first 5 history entries have been loaded, but more history exists.              HPV Vaccines (Series Information) Aged Out      No completion history exists for this topic.              Polio Vaccine (Inactivated Polio) (Series Information) Aged Out      No completion history exists for this  topic.              Meningococcal Immunization (Series Information) Aged Out      No completion history exists for this topic.              Discontinued - Cervical Cancer Screening  Discontinued        Frequency changed to Never automatically (Topic No Longer Applies)    10/03/2011  Done              Discontinued - Hepatitis B Vaccine (Hep B)  Discontinued      05/12/2021  Imm Admin: Hep A/HEP B Combined Vaccine (TwinRix)    09/20/2019  Imm Admin: Hep A/HEP B Combined Vaccine (TwinRix)    02/08/2016  Imm Admin: Hep A/HEP B Combined Vaccine (TwinRix)                    Patient Care Team:  Danielle Blas M.D. as PCP - General (Family Medicine)  Fariba Dutta M.D. (Medical Oncology)  Danielle Blas M.D. (Family Medicine)  Melodie Andre R.N.      Social History     Tobacco Use    Smoking status: Never     Passive exposure: Never    Smokeless tobacco: Never   Substance Use Topics    Alcohol use: Yes     Alcohol/week: 0.6 oz     Types: 1 Glasses of wine per week     Comment: 1-3 per week     Drug use: No     Family History   Problem Relation Age of Onset    Cancer Mother         breast cancer    Cancer Father         colon, throat, and skin     Heart Attack Brother 50     She  has a past medical history of Anesthesia, Bowel habit changes, Breast cancer (HCC), Cancer (HCC) (2017), Cancer (HCC), Cataract, Chronic pain, Chronic pelvic pain in female, Colon polyp, ENDOMETRIOSIS, Endometriosis, Family history of colon cancer, Gynecological disorder, Heart burn, Hepatitis, Hyperlipidemia, Indigestion, Insomnia, Jaundice (1967 ), Pain, Pain, Pneumonia (2006), Postmenopausal HRT (hormone replacement therapy), Psychiatric problem, Recurrent UTI, Seizure (HCC), Snoring, and Urine frequency.   Past Surgical History:   Procedure Laterality Date    BREAST BIOPSY Left 2/16/2017    Procedure: BREAST BIOPSY-RE EXCISION FOR MARGINS ;  Surgeon: Adriano Baird M.D.;  Location: SURGERY [facility];  Service:      "MASTECTOMY Left 2/8/2017    Procedure: MASTECTOMY - PARTIAL PLACEMENT OF CAVITY EVALUATION DEVICE;  Surgeon: Adriano Baird M.D.;  Location: SURGERY [facility];  Service:     NODE BIOPSY SENTINEL  2/8/2017    Procedure: NODE BIOPSY SENTINEL;  Surgeon: Adriano Baird M.D.;  Location: SURGERY [facility];  Service:     LUMPECTOMY Left Feb 2017    LUMPECTOMY  2007    Left - benign    ABDOMINAL HYSTERECTOMY TOTAL  1999    for endometriosis    OVARIAN CYSTECTOMY  1985    OTHER ORTHOPEDIC SURGERY  1964    ORIF L elbow compound fx    BREAST BIOPSY      left breast    BREAST BIOPSY      left breast biopsy 2008 (benign)    CATARACT EXTRACTION WITH IOL Bilateral     LAPAROSCOPY  [id number]    multiple for endometriosis       ROS:    All positives noted in HPI. All others reviewed and are negative.    Ostomy or other tubes or amputations: no  Chronic oxygen use: no  Last eye exam: UTD per report  : denies urinary incontinence; does not interfere with ADLs/ sleep  Gait: stable   Problems with balance/ difficulty walking: no  Hearing: good  Dentition: adequate    Lab results 1/29/24 reviewed with patient at visit today.     Exam:   /66 (BP Location: Left arm, Patient Position: Sitting, BP Cuff Size: Adult)   Pulse 87   Temp 36.8 °C (98.3 °F) (Temporal)   Resp 17   Ht 1.575 m (5' 2\")   Wt 47.9 kg (105 lb 9.6 oz)   SpO2 95%  Body mass index is 19.31 kg/m².    Gen: Well developed; well nourished; no acute distress; age appropriate appearance   HEENT: Normocephalic; atraumatic; PEERLA b/l; sclera clear b/l; b/l external auditory canals WNL; b/l TM WNL; nares patent; oropharynx clear; oral mucosa moist; tongue midline; dentition adequate   Neck: No adenopathy; no thyromegaly  CV: Regular rate and rhythm; S1/ S2 present; no murmur, gallop or rub noted  Pulm: No respiratory distress; clear to ascultation b/l; no wheezing or stridor noted b/l  Abd: Adequate bowel sounds noted; soft and nontender; no rebound, " rigidity, nor distention  Extremities: No peripheral edema b/l LE extremities/ no clubbing nor cyanosis noted  Skin: Warm and dry; no rashes noted   Neuro: No focal deficits noted; pt is able to get up out of chair unassisted and walk forward  Psych: AAOx4; mood and affect are appropriate    Assessment and Plan:  1. Mixed dyslipidemia  Uncontrolled with history of statin intolerance. Case discussed at visit today, and I recommend patient update CT Cardiac Imaging (score was 0 1/21) for further evaluation. I have referred her to Dr. Bloch in the past for additional treatment options/management but she did not establish care with him.   - CT-CARDIAC SCORING; Future  - Subsequent Annual Wellness Visit - Includes PPPS ()    2. Primary insomnia  Stable/ patient can continue PRN Ambien use for insomnia management.  reviewed today and no concerns noted. Pt is well versed in safe use of controlled medication, and benefit of use outweighs risk at this time. New RX sent to pharmacy.   - zolpidem (AMBIEN) 10 MG Tab; Take 1 Tablet by mouth at bedtime as needed for Sleep for up to 90 days.  Dispense: 90 Tablet; Refill: 0  - Subsequent Annual Wellness Visit - Includes PPPS ()    3. Other dysphagia  New condition involving both liquids and solids, as described above in HPI. Recommend patient proceed with imaging for further work up and take great care when eating/drinking. I provided patient with number to call to schedule imaging appointment.   - DX-ESOPHAGUS - [id number]; Future  - Subsequent Annual Wellness Visit - Includes PPPS ()    4. Health care maintenance  New referral made to [facility] for annual skin check.   - Referral to Dermatology  - Subsequent Annual Wellness Visit - Includes PPPS ()    5. Breast cancer screening by mammogram  Patient will be due for annual screening mammogram in June 2024.   - MA-SCREENING MAMMO BILAT W/TOMOSYNTHESIS W/CAD; Future  - Subsequent Annual  Wellness Visit - Includes PPPS ()    6. Colon cancer screening  Patient has referral to [facility] in place, and I reminded her to make appointment in near future.   - Subsequent Annual Wellness Visit - Includes PPPS ()    7. Celiac artery stenosis (HCC)  Known condition managed by Dr. Badillo.   - Subsequent Annual Wellness Visit - Includes PPPS ()    8. Chronic narcotic dependence (HCC)  Stable/ condition managed by [facility].   - Subsequent Annual Wellness Visit - Includes PPPS ()    9. Other chronic pain  Stable/ condition managed by [facility].   - Subsequent Annual Wellness Visit - Includes PPPS ()    10. Superior mesenteric artery stenosis (HCC)  Known condition managed by Dr. Badillo.  - Subsequent Annual Wellness Visit - Includes PPPS ()    11. Vitamin D deficiency  Stable/ recommend patient continue current Vitamin D supplementation for maintenance.   - Subsequent Annual Wellness Visit - Includes PPPS ()    12. Post menopausal syndrome  Stable/ patient endorses benefits from ongoing HRT use.   - Subsequent Annual Wellness Visit - Includes PPPS ()    13. Risk for falls  - Patient identified as fall risk.  Appropriate orders and counseling given.  - Subsequent Annual Wellness Visit - Includes PPPS ()    14. Encounter for Medicare annual wellness exam  Patient remains well versed about medical conditions that need additional assistance moving forward. Recommend patient discontinue all B12 containing supplementation.   - Subsequent Annual Wellness Visit - Includes PPPS ()       Services needed: no new services needed at this time  Health Care Screening: recommendations as per orders if indicated.  Referrals offered: none  Counseling provided today:  Prevent falls and reduce trip hazards; Secure or remove rugs if present   Maintain working fire alarm and carbon monoxide detectors   Engage in regular physical activity daily and social  activities weekly as tolerated

## 2024-02-27 ENCOUNTER — HOSPITAL ENCOUNTER (OUTPATIENT)
Dept: RADIOLOGY | Facility: MEDICAL CENTER | Age: 69
End: 2024-02-27
Attending: FAMILY MEDICINE
Payer: MEDICARE

## 2024-02-27 ENCOUNTER — HOSPITAL ENCOUNTER (OUTPATIENT)
Dept: RADIOLOGY | Facility: MEDICAL CENTER | Age: 69
End: 2024-02-27
Attending: FAMILY MEDICINE
Payer: COMMERCIAL

## 2024-02-27 DIAGNOSIS — R13.19 OTHER DYSPHAGIA: ICD-10-CM

## 2024-02-27 DIAGNOSIS — E78.2 MIXED DYSLIPIDEMIA: Chronic | ICD-10-CM

## 2024-02-27 PROCEDURE — 4410556 CT-CARDIAC SCORING (SELF PAY ONLY)

## 2024-03-01 ENCOUNTER — APPOINTMENT (RX ONLY)
Dept: URBAN - METROPOLITAN AREA CLINIC 4 | Facility: CLINIC | Age: 69
Setting detail: DERMATOLOGY
End: 2024-03-01

## 2024-03-01 DIAGNOSIS — Z71.89 OTHER SPECIFIED COUNSELING: ICD-10-CM

## 2024-03-01 DIAGNOSIS — L81.4 OTHER MELANIN HYPERPIGMENTATION: ICD-10-CM

## 2024-03-01 DIAGNOSIS — L85.3 XEROSIS CUTIS: ICD-10-CM

## 2024-03-01 DIAGNOSIS — D18.0 HEMANGIOMA: ICD-10-CM

## 2024-03-01 DIAGNOSIS — L82.1 OTHER SEBORRHEIC KERATOSIS: ICD-10-CM

## 2024-03-01 DIAGNOSIS — D22 MELANOCYTIC NEVI: ICD-10-CM

## 2024-03-01 PROBLEM — D18.01 HEMANGIOMA OF SKIN AND SUBCUTANEOUS TISSUE: Status: ACTIVE | Noted: 2024-03-01

## 2024-03-01 PROBLEM — D22.5 MELANOCYTIC NEVI OF TRUNK: Status: ACTIVE | Noted: 2024-03-01

## 2024-03-01 PROCEDURE — ? ADDITIONAL NOTES

## 2024-03-01 PROCEDURE — 99203 OFFICE O/P NEW LOW 30 MIN: CPT

## 2024-03-01 PROCEDURE — ? COUNSELING

## 2024-03-01 ASSESSMENT — LOCATION DETAILED DESCRIPTION DERM
LOCATION DETAILED: RIGHT INFERIOR UPPER BACK
LOCATION DETAILED: RIGHT SUPERIOR MEDIAL UPPER BACK
LOCATION DETAILED: UPPER STERNUM
LOCATION DETAILED: LEFT MEDIAL SUPERIOR CHEST
LOCATION DETAILED: MIDDLE STERNUM
LOCATION DETAILED: RIGHT MID-UPPER BACK

## 2024-03-01 ASSESSMENT — LOCATION SIMPLE DESCRIPTION DERM
LOCATION SIMPLE: CHEST
LOCATION SIMPLE: RIGHT UPPER BACK

## 2024-03-01 ASSESSMENT — LOCATION ZONE DERM: LOCATION ZONE: TRUNK

## 2024-03-01 NOTE — PROCEDURE: ADDITIONAL NOTES
Additional Notes: Pt was advised BBL is a treatment option for her dark spots. Pt will call to schedule.
Render Risk Assessment In Note?: no
Detail Level: Detailed

## 2024-04-11 ENCOUNTER — APPOINTMENT (OUTPATIENT)
Dept: RADIOLOGY | Facility: MEDICAL CENTER | Age: 69
End: 2024-04-11
Attending: FAMILY MEDICINE
Payer: MEDICARE

## 2024-05-07 ENCOUNTER — APPOINTMENT (OUTPATIENT)
Dept: RADIOLOGY | Facility: MEDICAL CENTER | Age: 69
End: 2024-05-07
Attending: FAMILY MEDICINE
Payer: MEDICARE

## 2024-05-28 ENCOUNTER — APPOINTMENT (RX ONLY)
Dept: URBAN - METROPOLITAN AREA CLINIC 36 | Facility: CLINIC | Age: 69
Setting detail: DERMATOLOGY
End: 2024-05-28

## 2024-05-28 DIAGNOSIS — Z41.9 ENCOUNTER FOR PROCEDURE FOR PURPOSES OTHER THAN REMEDYING HEALTH STATE, UNSPECIFIED: ICD-10-CM

## 2024-05-28 PROCEDURE — ? COSMETIC CONSULTATION: FRACTIONAL RESURFACING

## 2024-05-28 ASSESSMENT — LOCATION SIMPLE DESCRIPTION DERM
LOCATION SIMPLE: UPPER LIP
LOCATION SIMPLE: CHIN
LOCATION SIMPLE: INFERIOR FOREHEAD
LOCATION SIMPLE: LEFT CHEEK
LOCATION SIMPLE: RIGHT CHEEK

## 2024-05-28 ASSESSMENT — LOCATION DETAILED DESCRIPTION DERM
LOCATION DETAILED: RIGHT CENTRAL MALAR CHEEK
LOCATION DETAILED: PHILTRUM
LOCATION DETAILED: LEFT CENTRAL MALAR CHEEK
LOCATION DETAILED: LEFT CHIN
LOCATION DETAILED: INFERIOR MID FOREHEAD

## 2024-05-28 ASSESSMENT — LOCATION ZONE DERM
LOCATION ZONE: LIP
LOCATION ZONE: FACE

## 2024-06-04 ENCOUNTER — APPOINTMENT (RX ONLY)
Dept: URBAN - METROPOLITAN AREA CLINIC 36 | Facility: CLINIC | Age: 69
Setting detail: DERMATOLOGY
End: 2024-06-04

## 2024-06-04 ENCOUNTER — RX ONLY (OUTPATIENT)
Age: 69
Setting detail: RX ONLY
End: 2024-06-04

## 2024-06-04 DIAGNOSIS — Z41.9 ENCOUNTER FOR PROCEDURE FOR PURPOSES OTHER THAN REMEDYING HEALTH STATE, UNSPECIFIED: ICD-10-CM

## 2024-06-04 PROCEDURE — ? FRAXEL

## 2024-06-04 RX ORDER — HYDROQUINONE 6 %
EMULSION (GRAM) TOPICAL
Qty: 30 | Refills: 0 | Status: ERX | COMMUNITY
Start: 2024-06-04

## 2024-06-04 NOTE — PROCEDURE: FRAXEL
Total Coverage: 35%
Energy(Mj/Cm2): 30
Medium Plastic Eye Shield Text: The ocular mucosa was anesthetized with tetracaine. Once adequate anesthesia was optained, medium plastic eye shields were inserted and remained in place until the procedure was completed.
Energy(Mj/Cm2): 20
Consent: Written consent obtained, risks reviewed including but not limited to pain and incomplete improvement.
Energy(Mj/Cm2): 1
Treatment Level: 4
Large Metal Eye Shield Text: The ocular mucosa was anesthetized with tetracaine. Once adequate anesthesia was optained, large metal eye shields were inserted and remained in place until the procedure was completed.
Total Coverage: 25%
Tip: 15mm
External Cooling: Kurt Cryo 5
Location: full face
Add Post-Care Below To The Note: No
Location: decolletage of the chest
Location: dorsal forearms
Large Plastic Eye Shield Text: The ocular mucosa was anesthetized with tetracaine. Once adequate anesthesia was optained, large plastic eye shields were inserted and remained in place until the procedure was completed.
Treatment Level: 2
External Cooling Fan Speed: 6
Detail Level: Simple
Total Coverage: 11%
Post-Care Instructions: I reviewed with the patient in detail post-care instructions. Patient should avoid sun until area fully healed.
Energy(Mj/Cm2): 15
Was An Eye Shield Used?: Yes - Small (Metal)
Price (Use Numbers Only, No Special Characters Or $): 473.00
Small Metal Eye Shield Text: The ocular mucosa was anesthetized with tetracaine. Once adequate anesthesia was optained, small metal eye shields were inserted and remained in place until the procedure was completed.
Small Plastic Eye Shield Text: The ocular mucosa was anesthetized with tetracaine. Once adequate anesthesia was optained, small plastic eye shields were inserted and remained in place until the procedure was completed.
Medium Metal Eye Shield Text: The ocular mucosa was anesthetized with tetracaine. Once adequate anesthesia was optained, medium metal eye shields were inserted and remained in place until the procedure was completed.
Location: dorsal arms
Indication: surgical scars
Location: neck
Wavelength: 1927nm

## 2024-06-06 ENCOUNTER — APPOINTMENT (OUTPATIENT)
Dept: RADIOLOGY | Facility: MEDICAL CENTER | Age: 69
End: 2024-06-06
Attending: FAMILY MEDICINE
Payer: MEDICARE

## 2024-06-10 ENCOUNTER — HOSPITAL ENCOUNTER (OUTPATIENT)
Dept: RADIOLOGY | Facility: MEDICAL CENTER | Age: 69
End: 2024-06-10
Attending: FAMILY MEDICINE
Payer: MEDICARE

## 2024-06-10 DIAGNOSIS — Z12.31 BREAST CANCER SCREENING BY MAMMOGRAM: ICD-10-CM

## 2024-06-10 PROCEDURE — 77063 BREAST TOMOSYNTHESIS BI: CPT

## 2024-07-02 ENCOUNTER — APPOINTMENT (RX ONLY)
Dept: URBAN - METROPOLITAN AREA CLINIC 36 | Facility: CLINIC | Age: 69
Setting detail: DERMATOLOGY
End: 2024-07-02

## 2024-07-02 DIAGNOSIS — Z41.9 ENCOUNTER FOR PROCEDURE FOR PURPOSES OTHER THAN REMEDYING HEALTH STATE, UNSPECIFIED: ICD-10-CM

## 2024-07-02 PROCEDURE — ? FRAXEL

## 2024-07-02 ASSESSMENT — LOCATION DETAILED DESCRIPTION DERM
LOCATION DETAILED: RIGHT CENTRAL MALAR CHEEK
LOCATION DETAILED: LEFT FOREHEAD
LOCATION DETAILED: LEFT CENTRAL MALAR CHEEK
LOCATION DETAILED: RIGHT MEDIAL BUCCAL CHEEK

## 2024-07-02 ASSESSMENT — LOCATION ZONE DERM: LOCATION ZONE: FACE

## 2024-07-02 ASSESSMENT — LOCATION SIMPLE DESCRIPTION DERM
LOCATION SIMPLE: LEFT FOREHEAD
LOCATION SIMPLE: RIGHT CHEEK
LOCATION SIMPLE: LEFT CHEEK

## 2024-07-02 NOTE — PROCEDURE: FRAXEL
Treatment Level: 4
Price (Use Numbers Only, No Special Characters Or $): 473.00
Was An Eye Shield Used?: Yes - Small (Metal)
Location: decolletage of the chest
Medium Plastic Eye Shield Text: The ocular mucosa was anesthetized with tetracaine. Once adequate anesthesia was optained, medium plastic eye shields were inserted and remained in place until the procedure was completed.
Energy(Mj/Cm2): 1
Small Metal Eye Shield Text: The ocular mucosa was anesthetized with tetracaine. Once adequate anesthesia was optained, small metal eye shields were inserted and remained in place until the procedure was completed.
Large Plastic Eye Shield Text: The ocular mucosa was anesthetized with tetracaine. Once adequate anesthesia was optained, large plastic eye shields were inserted and remained in place until the procedure was completed.
Detail Level: Simple
Wavelength: 1550nm
Medium Metal Eye Shield Text: The ocular mucosa was anesthetized with tetracaine. Once adequate anesthesia was optained, medium metal eye shields were inserted and remained in place until the procedure was completed.
Total Coverage: 35%
Energy(Mj/Cm2): 30
Energy(Mj/Cm2): 20
Tip: 15mm
Total Coverage: 11%
Indication: surgical scars
Large Metal Eye Shield Text: The ocular mucosa was anesthetized with tetracaine. Once adequate anesthesia was optained, large metal eye shields were inserted and remained in place until the procedure was completed.
Add Post-Care Below To The Note: No
Consent: Written consent obtained, risks reviewed including but not limited to pain and incomplete improvement.
Location: neck
Number Of Passes: 6
External Cooling: Kurt Cryo 5
Location: dorsal arms
Location: full face
Location: dorsal forearms
Post-Care Instructions: I reviewed with the patient in detail post-care instructions. Patient should avoid sun until area fully healed.
Small Plastic Eye Shield Text: The ocular mucosa was anesthetized with tetracaine. Once adequate anesthesia was optained, small plastic eye shields were inserted and remained in place until the procedure was completed.
Total Coverage: 25%

## 2024-07-06 NOTE — PROGRESS NOTES
RADIATION ONCOLOGY FOLLOW-UP    DATE OF SERVICE: 5/9/2017    IDENTIFICATION:   A 61 y.o. female with stage IA infiltrating ductal carcinoma left breast central portion status post partial mastectomy sentinel node biopsy followed by accelerated partial breast irradiation using SHANTA device.  She received 3400 cGy 10 fractions completed 3/2/2017    HISTORY OF PRESENT ILLNESS:   Returns for follow-up. In the interim she started anastrozole tarry had a relatively well. She states that she does have some itching and rash that is not localized to one particular area that waxes and wanes. Wondering if it's related to the anastrozole.  She offers no breast or respiratory complaints.    CURRENT MEDICATIONS:  Current Outpatient Prescriptions   Medication Sig Dispense Refill   • anastrozole (ARIMIDEX) 1 MG Tab Take 1 mg by mouth every day.     • zolpidem (AMBIEN) 10 MG Tab Take 1 Tab by mouth at bedtime as needed for Sleep. 15 Tab 2   • Cholecalciferol (VITAMIN D3) 2000 UNITS Tab Take 1 Tab by mouth 2 Times a Day.     • hydrocodone-acetaminophen (NORCO) 5-325 MG Tab per tablet Take 1-2 Tabs by mouth every four hours as needed. 20 Tab 0   • duloxetine (CYMBALTA) 20 MG Cap DR Particles Take 20 mg by mouth every day.     • pregabalin (LYRICA) 25 MG Cap Take 25 mg by mouth every day.     • Omega-3 Fatty Acids (FISH OIL) 1000 MG Cap capsule Take 1,000 mg by mouth every day.     • CALCIUM & MAGNESIUM CARBONATES PO Take 1 Tab by mouth every day.     • DHEA 25 MG Tab Take 25 mg by mouth every day.     • cyanocobalamin (VITAMIN B-12) 100 MCG Tab Take 100 mcg by mouth every day.     • tizanidine (ZANAFLEX) 4 MG Tab Take 4 mg by mouth at bedtime as needed.       No current facility-administered medications for this encounter.       ALLERGIES:  Contrast media with iodine; Cherry; Latex; Oxycontin; Demerol; Nsaids; and Tape    PHYSICAL EXAM:   Filed Vitals:    05/09/17 1441   BP: 112/71   Temp: 36.4 °C (97.5 °F)   SpO2: 93%           GENERAL: Alert and oriented no acute distress  HEENT:  Pupils are equal, round, and reactive to light.  Extraocular muscles   are intact. Sclerae nonicteric.  Conjunctivae pink.  Oral cavity, tongue   protrudes midline.   NECK:  Supple without evidence of thyromegaly.  NODES:  No peripheral adenopathy of the neck, supraclavicular fossa or axillae   bilaterally.  LUNGS:  Clear to ascultation and resonant to percussion.  HEART:  Regular rate and rhythm.  No murmur appreciated  BREAST: Right breast without palpable abnormality left breast curvilinear central scar with some induration deep to the scar.  ABDOMEN:  Soft. No evidence of hepatosplenomegaly.  Positive bowel sounds.  EXTREMITIES:  Without Edema.  NEUROLOGIC: Grossly intact.    Pain Scale: 0-10  Pain Assessement: 5  Pain Location, Orientation and Scale: chronic rt hip pain  What makes the pain better: Norco      LABORATORY DATA:   Lab Results   Component Value Date/Time    SODIUM 135 10/26/2016 08:00 AM    POTASSIUM 3.8 10/26/2016 08:00 AM    CHLORIDE 100 10/26/2016 08:00 AM    CO2 29 10/26/2016 08:00 AM    GLUCOSE 76 10/26/2016 08:00 AM    BUN 15 10/26/2016 08:00 AM    CREATININE 0.60 10/26/2016 08:00 AM    BUN-CREATININE RATIO 26* 04/14/2014 03:00 PM     Lab Results   Component Value Date/Time    ALKALINE PHOSPHATASE 42 10/26/2016 08:00 AM    AST(SGOT) 18 10/26/2016 08:00 AM    ALT(SGPT) 13 10/26/2016 08:00 AM    TOTAL BILIRUBIN 0.6 10/26/2016 08:00 AM      Lab Results   Component Value Date/Time    WBC 4.4* 02/15/2017 03:04 PM    RBC 4.44 02/15/2017 03:04 PM    HEMOGLOBIN 13.4 02/15/2017 03:04 PM    HEMATOCRIT 40.5 02/15/2017 03:04 PM    MCV 91.2 02/15/2017 03:04 PM    MCH 30.2 02/15/2017 03:04 PM    MCHC 33.1* 02/15/2017 03:04 PM    MPV 9.5 02/15/2017 03:04 PM    NEUTROPHILS-POLYS 52.10 10/26/2016 08:00 AM    LYMPHOCYTES 32.70 10/26/2016 08:00 AM    MONOCYTES 7.80 10/26/2016 08:00 AM    EOSINOPHILS 6.10 10/26/2016 08:00 AM    BASOPHILS 1.10  10/26/2016 08:00 AM        RADIOLOGY DATA:  No results found.    IMPRESSION:    A 61 y.o. with stage IA left breast cancer receptor positive. She is status post accelerated partial breast radiation.    RECOMMENDATIONS:   Reassured her. Recommended follow-up imaging of the left breast 6 months post completion of therapy. We'll set up for unilateral imaging left breast for September. She'll have bilateral imaging in March. I did ask her to return for follow-up in September after breast imaging.    25 minutes was spent face-to-face with patient in the office and more than half of that time was spent counseling patient or coordinating care as described above.    Thank you for the opportunity to participate in her care.  If any questions or comments, please do not hesitate in calling.    Shannon PILLAI M.D.  Electronically signed by: Shannon Rincon V, 5/9/2017 3:38 PM  [phone]         no

## 2024-08-07 ENCOUNTER — RX ONLY (OUTPATIENT)
Age: 69
Setting detail: RX ONLY
End: 2024-08-07

## 2024-08-07 ENCOUNTER — APPOINTMENT (RX ONLY)
Dept: URBAN - METROPOLITAN AREA CLINIC 36 | Facility: CLINIC | Age: 69
Setting detail: DERMATOLOGY
End: 2024-08-07

## 2024-08-07 DIAGNOSIS — Z41.9 ENCOUNTER FOR PROCEDURE FOR PURPOSES OTHER THAN REMEDYING HEALTH STATE, UNSPECIFIED: ICD-10-CM

## 2024-08-07 PROCEDURE — ? FRAXEL

## 2024-08-07 RX ORDER — HYDROQUINONE 6 %
EMULSION (GRAM) TOPICAL
Qty: 30 | Refills: 2 | Status: ERX

## 2024-08-07 ASSESSMENT — LOCATION SIMPLE DESCRIPTION DERM
LOCATION SIMPLE: LEFT CHEEK
LOCATION SIMPLE: RIGHT CHEEK
LOCATION SIMPLE: LEFT FOREHEAD

## 2024-08-07 ASSESSMENT — LOCATION DETAILED DESCRIPTION DERM
LOCATION DETAILED: LEFT FOREHEAD
LOCATION DETAILED: LEFT CENTRAL MALAR CHEEK
LOCATION DETAILED: RIGHT MEDIAL BUCCAL CHEEK
LOCATION DETAILED: RIGHT CENTRAL MALAR CHEEK

## 2024-08-07 ASSESSMENT — LOCATION ZONE DERM: LOCATION ZONE: FACE

## 2024-08-09 ENCOUNTER — OFFICE VISIT (OUTPATIENT)
Dept: INTERNAL MEDICINE | Facility: IMAGING CENTER | Age: 69
End: 2024-08-09
Payer: MEDICARE

## 2024-08-09 VITALS
OXYGEN SATURATION: 94 % | RESPIRATION RATE: 17 BRPM | WEIGHT: 105.6 LBS | DIASTOLIC BLOOD PRESSURE: 62 MMHG | BODY MASS INDEX: 19.43 KG/M2 | TEMPERATURE: 97.9 F | HEART RATE: 96 BPM | HEIGHT: 62 IN | SYSTOLIC BLOOD PRESSURE: 110 MMHG

## 2024-08-09 DIAGNOSIS — Z12.11 SCREENING FOR COLORECTAL CANCER: ICD-10-CM

## 2024-08-09 DIAGNOSIS — Z12.12 SCREENING FOR COLORECTAL CANCER: ICD-10-CM

## 2024-08-09 DIAGNOSIS — F51.01 PRIMARY INSOMNIA: Chronic | ICD-10-CM

## 2024-08-09 PROCEDURE — 99213 OFFICE O/P EST LOW 20 MIN: CPT | Performed by: FAMILY MEDICINE

## 2024-08-09 PROCEDURE — 3078F DIAST BP <80 MM HG: CPT | Performed by: FAMILY MEDICINE

## 2024-08-09 PROCEDURE — 3074F SYST BP LT 130 MM HG: CPT | Performed by: FAMILY MEDICINE

## 2024-08-09 RX ORDER — ZOLPIDEM TARTRATE 10 MG/1
10 TABLET ORAL NIGHTLY PRN
Qty: 90 TABLET | Refills: 0 | Status: SHIPPED | OUTPATIENT
Start: 2024-08-09 | End: 2024-11-07

## 2024-08-09 ASSESSMENT — FIBROSIS 4 INDEX: FIB4 SCORE: 1.12

## 2024-08-09 NOTE — PROGRESS NOTES
"Chief Complaint   Patient presents with    Medication Refill     Ambien       HPI:  Patient is a 69 y.o. female established patient who presents today to obtain an Ambien prescription for chronic primary insomnia management. She has used Ambien infrequently in the past with good results, denies medication related side effects, and uses this controlled medication in a safe manner.     Patient Active Problem List    Diagnosis Date Noted    Risk for falls 02/09/2024    Superior mesenteric artery stenosis (HCC) 08/18/2023    Celiac artery stenosis (HCC) 08/18/2023    Hand arthritis 08/18/2023    Mixed dyslipidemia 02/12/2018    Post menopausal syndrome 02/12/2018    Vitamin D deficiency 02/12/2018    Chronic right hip pain 05/21/2017    History of breast cancer 05/08/2017    Chronic narcotic dependence (HCC) 05/05/2017    Diverticulosis of large intestine without hemorrhage, per 12/23/16 COLONOSCOPY 01/14/2017    Adenomatous polyp of colon, per 12/23/16 COLONOSCOPY 01/14/2017    Atherosclerosis of right carotid artery, mild 12/15/2016    History of opthalmic migraine 07/12/2016    Frozen shoulder 03/08/2016    Irritable bowel syndrome (IBS) 01/19/2016    Family history of colon cancer     Chronic pain 08/13/2013    Primary insomnia     Chronic pelvic pain in female        Past medical, surgical, family, and social history was reviewed and updated in Epic chart by me today.     Medications and allergies reviewed and updated in Epic chart by me today.     ROS:  Pertinent positives listed above in HPI. All other systems have been reviewed and are negative.    PE:   /62 (BP Location: Left arm, Patient Position: Sitting, BP Cuff Size: Adult)   Pulse 96   Temp 36.6 °C (97.9 °F) (Temporal)   Resp 17   Ht 1.575 m (5' 2\")   Wt 47.9 kg (105 lb 9.6 oz)   SpO2 94%   BMI 19.31 kg/m²   Vital signs reviewed with patient.     Gen: Well developed; well nourished; no acute distress; age appropriate appearance   CV: Regular " rate and rhythm; S1/ S2 present; no murmur, gallop or rub noted  Pulm: No respiratory distress; clear to ascultation b/l; no wheezing or stridor noted b/l  Skin: Warm and dry; no rashes noted   Neuro: No focal deficits noted   Psych: AAOx4; mood and affect are at her baseline     A/P:  1. Primary insomnia  Stable/ patient can continue infrequent Ambien use for insomnia management.  reviewed today and no concerns noted. Pt is well versed in safe use of controlled medication, and benefit of use outweighs risk at this time. New RX sent to pharmacy.   - zolpidem (AMBIEN) 10 MG Tab; Take 1 Tablet by mouth at bedtime as needed for Sleep for up to 90 days.  Dispense: 90 Tablet; Refill: 0    2. Screening for colorectal cancer  Patient was scheduled for screening colonoscopy earlier this week but unfortunately did not prep according to instructions. She will reschedule this important screening exam accordingly.

## 2025-03-26 ENCOUNTER — HOSPITAL ENCOUNTER (OUTPATIENT)
Facility: MEDICAL CENTER | Age: 70
End: 2025-03-26
Attending: FAMILY MEDICINE
Payer: MEDICARE

## 2025-03-26 ENCOUNTER — NON-PROVIDER VISIT (OUTPATIENT)
Dept: INTERNAL MEDICINE | Facility: IMAGING CENTER | Age: 70
End: 2025-03-26
Payer: MEDICARE

## 2025-03-26 DIAGNOSIS — Z91.89 AT INCREASED RISK FOR EXPOSURE TO MEASLES VIRUS: ICD-10-CM

## 2025-03-26 PROCEDURE — 86765 RUBEOLA ANTIBODY: CPT

## 2025-03-28 LAB — MEV IGG SER-ACNC: >300 AU/ML

## 2025-03-29 ENCOUNTER — RESULTS FOLLOW-UP (OUTPATIENT)
Dept: INTERNAL MEDICINE | Facility: IMAGING CENTER | Age: 70
End: 2025-03-29

## 2025-04-11 DIAGNOSIS — D75.89 MACROCYTOSIS: ICD-10-CM

## 2025-04-11 DIAGNOSIS — R73.9 HYPERGLYCEMIA: ICD-10-CM

## 2025-04-11 DIAGNOSIS — E78.2 MIXED DYSLIPIDEMIA: ICD-10-CM

## 2025-04-11 DIAGNOSIS — Z00.00 HEALTH CARE MAINTENANCE: ICD-10-CM

## 2025-04-11 DIAGNOSIS — R53.83 OTHER FATIGUE: ICD-10-CM

## 2025-04-11 DIAGNOSIS — E55.9 VITAMIN D DEFICIENCY: ICD-10-CM

## 2025-04-14 ENCOUNTER — NON-PROVIDER VISIT (OUTPATIENT)
Dept: INTERNAL MEDICINE | Facility: IMAGING CENTER | Age: 70
End: 2025-04-14
Payer: MEDICARE

## 2025-04-14 ENCOUNTER — HOSPITAL ENCOUNTER (OUTPATIENT)
Facility: MEDICAL CENTER | Age: 70
End: 2025-04-14
Attending: FAMILY MEDICINE
Payer: MEDICARE

## 2025-04-14 DIAGNOSIS — E78.2 MIXED DYSLIPIDEMIA: ICD-10-CM

## 2025-04-14 DIAGNOSIS — E55.9 VITAMIN D DEFICIENCY: ICD-10-CM

## 2025-04-14 DIAGNOSIS — R53.83 OTHER FATIGUE: ICD-10-CM

## 2025-04-14 DIAGNOSIS — R73.9 HYPERGLYCEMIA: ICD-10-CM

## 2025-04-14 DIAGNOSIS — D75.89 MACROCYTOSIS: ICD-10-CM

## 2025-04-14 DIAGNOSIS — Z00.00 HEALTH CARE MAINTENANCE: ICD-10-CM

## 2025-04-14 LAB
25(OH)D3 SERPL-MCNC: 33 NG/ML (ref 30–100)
ALBUMIN SERPL BCP-MCNC: 4.4 G/DL (ref 3.2–4.9)
ALBUMIN/GLOB SERPL: 1.6 G/DL
ALP SERPL-CCNC: 73 U/L (ref 30–99)
ALT SERPL-CCNC: 17 U/L (ref 2–50)
ANION GAP SERPL CALC-SCNC: 10 MMOL/L (ref 7–16)
AST SERPL-CCNC: 22 U/L (ref 12–45)
BASOPHILS # BLD AUTO: 1.9 % (ref 0–1.8)
BASOPHILS # BLD: 0.09 K/UL (ref 0–0.12)
BILIRUB SERPL-MCNC: 0.4 MG/DL (ref 0.1–1.5)
BUN SERPL-MCNC: 14 MG/DL (ref 8–22)
CALCIUM ALBUM COR SERPL-MCNC: 9 MG/DL (ref 8.5–10.5)
CALCIUM SERPL-MCNC: 9.3 MG/DL (ref 8.5–10.5)
CHLORIDE SERPL-SCNC: 101 MMOL/L (ref 96–112)
CHOLEST SERPL-MCNC: 289 MG/DL (ref 100–199)
CO2 SERPL-SCNC: 27 MMOL/L (ref 20–33)
CREAT SERPL-MCNC: 0.64 MG/DL (ref 0.5–1.4)
EOSINOPHIL # BLD AUTO: 0.29 K/UL (ref 0–0.51)
EOSINOPHIL NFR BLD: 6.2 % (ref 0–6.9)
ERYTHROCYTE [DISTWIDTH] IN BLOOD BY AUTOMATED COUNT: 42.5 FL (ref 35.9–50)
EST. AVERAGE GLUCOSE BLD GHB EST-MCNC: 134 MG/DL
FOLATE SERPL-MCNC: 24 NG/ML
GFR SERPLBLD CREATININE-BSD FMLA CKD-EPI: 96 ML/MIN/1.73 M 2
GLOBULIN SER CALC-MCNC: 2.7 G/DL (ref 1.9–3.5)
GLUCOSE SERPL-MCNC: 83 MG/DL (ref 65–99)
HBA1C MFR BLD: 6.3 % (ref 4–5.6)
HCT VFR BLD AUTO: 39 % (ref 37–47)
HDLC SERPL-MCNC: 53 MG/DL
HGB BLD-MCNC: 12.5 G/DL (ref 12–16)
IMM GRANULOCYTES # BLD AUTO: 0.01 K/UL (ref 0–0.11)
IMM GRANULOCYTES NFR BLD AUTO: 0.2 % (ref 0–0.9)
LDLC SERPL CALC-MCNC: 180 MG/DL
LYMPHOCYTES # BLD AUTO: 1.53 K/UL (ref 1–4.8)
LYMPHOCYTES NFR BLD: 32.8 % (ref 22–41)
MCH RBC QN AUTO: 29.5 PG (ref 27–33)
MCHC RBC AUTO-ENTMCNC: 32.1 G/DL (ref 32.2–35.5)
MCV RBC AUTO: 92 FL (ref 81.4–97.8)
MONOCYTES # BLD AUTO: 0.34 K/UL (ref 0–0.85)
MONOCYTES NFR BLD AUTO: 7.3 % (ref 0–13.4)
NEUTROPHILS # BLD AUTO: 2.4 K/UL (ref 1.82–7.42)
NEUTROPHILS NFR BLD: 51.6 % (ref 44–72)
NRBC # BLD AUTO: 0 K/UL
NRBC BLD-RTO: 0 /100 WBC (ref 0–0.2)
PLATELET # BLD AUTO: 256 K/UL (ref 164–446)
PMV BLD AUTO: 9.9 FL (ref 9–12.9)
POTASSIUM SERPL-SCNC: 4.4 MMOL/L (ref 3.6–5.5)
PROT SERPL-MCNC: 7.1 G/DL (ref 6–8.2)
RBC # BLD AUTO: 4.24 M/UL (ref 4.2–5.4)
SODIUM SERPL-SCNC: 138 MMOL/L (ref 135–145)
T4 FREE SERPL-MCNC: 1.09 NG/DL (ref 0.93–1.7)
TRIGL SERPL-MCNC: 282 MG/DL (ref 0–149)
TSH SERPL-ACNC: 2.13 UIU/ML (ref 0.38–5.33)
VIT B12 SERPL-MCNC: 2563 PG/ML (ref 211–911)
WBC # BLD AUTO: 4.7 K/UL (ref 4.8–10.8)

## 2025-04-14 PROCEDURE — 80061 LIPID PANEL: CPT

## 2025-04-14 PROCEDURE — 85025 COMPLETE CBC W/AUTO DIFF WBC: CPT

## 2025-04-14 PROCEDURE — 83036 HEMOGLOBIN GLYCOSYLATED A1C: CPT

## 2025-04-14 PROCEDURE — 84443 ASSAY THYROID STIM HORMONE: CPT

## 2025-04-14 PROCEDURE — 99999 PR NO CHARGE: CPT

## 2025-04-14 PROCEDURE — 82306 VITAMIN D 25 HYDROXY: CPT

## 2025-04-14 PROCEDURE — 82607 VITAMIN B-12: CPT

## 2025-04-14 PROCEDURE — 84439 ASSAY OF FREE THYROXINE: CPT

## 2025-04-14 PROCEDURE — 82746 ASSAY OF FOLIC ACID SERUM: CPT

## 2025-04-14 PROCEDURE — 80053 COMPREHEN METABOLIC PANEL: CPT

## 2025-04-15 ENCOUNTER — RESULTS FOLLOW-UP (OUTPATIENT)
Dept: INTERNAL MEDICINE | Facility: IMAGING CENTER | Age: 70
End: 2025-04-15

## 2025-04-16 ENCOUNTER — APPOINTMENT (OUTPATIENT)
Dept: URBAN - METROPOLITAN AREA CLINIC 4 | Facility: CLINIC | Age: 70
Setting detail: DERMATOLOGY
End: 2025-04-16

## 2025-04-16 DIAGNOSIS — L81.4 OTHER MELANIN HYPERPIGMENTATION: ICD-10-CM

## 2025-04-16 DIAGNOSIS — D22 MELANOCYTIC NEVI: ICD-10-CM

## 2025-04-16 DIAGNOSIS — L82.1 OTHER SEBORRHEIC KERATOSIS: ICD-10-CM

## 2025-04-16 DIAGNOSIS — D18.0 HEMANGIOMA: ICD-10-CM

## 2025-04-16 DIAGNOSIS — L30.9 DERMATITIS, UNSPECIFIED: ICD-10-CM

## 2025-04-16 DIAGNOSIS — Z71.89 OTHER SPECIFIED COUNSELING: ICD-10-CM

## 2025-04-16 PROBLEM — D18.01 HEMANGIOMA OF SKIN AND SUBCUTANEOUS TISSUE: Status: ACTIVE | Noted: 2025-04-16

## 2025-04-16 PROBLEM — D22.5 MELANOCYTIC NEVI OF TRUNK: Status: ACTIVE | Noted: 2025-04-16

## 2025-04-16 PROCEDURE — ? TREATMENT REGIMEN

## 2025-04-16 PROCEDURE — ? ADDITIONAL NOTES

## 2025-04-16 PROCEDURE — ? COUNSELING

## 2025-04-16 PROCEDURE — 99213 OFFICE O/P EST LOW 20 MIN: CPT

## 2025-04-16 ASSESSMENT — LOCATION DETAILED DESCRIPTION DERM
LOCATION DETAILED: UPPER STERNUM
LOCATION DETAILED: RIGHT MID-UPPER BACK
LOCATION DETAILED: RIGHT SUPERIOR MEDIAL UPPER BACK
LOCATION DETAILED: LEFT MEDIAL SUPERIOR CHEST
LOCATION DETAILED: RIGHT INFERIOR UPPER BACK
LOCATION DETAILED: MIDDLE STERNUM

## 2025-04-16 ASSESSMENT — LOCATION SIMPLE DESCRIPTION DERM
LOCATION SIMPLE: CHEST
LOCATION SIMPLE: RIGHT UPPER BACK

## 2025-04-16 ASSESSMENT — LOCATION ZONE DERM: LOCATION ZONE: TRUNK

## 2025-04-22 ENCOUNTER — APPOINTMENT (OUTPATIENT)
Dept: URBAN - METROPOLITAN AREA CLINIC 20 | Facility: CLINIC | Age: 70
Setting detail: DERMATOLOGY
End: 2025-04-22

## 2025-04-24 ENCOUNTER — OFFICE VISIT (OUTPATIENT)
Dept: INTERNAL MEDICINE | Facility: IMAGING CENTER | Age: 70
End: 2025-04-24
Payer: MEDICARE

## 2025-04-24 VITALS
TEMPERATURE: 97.8 F | DIASTOLIC BLOOD PRESSURE: 66 MMHG | HEIGHT: 62 IN | HEART RATE: 83 BPM | WEIGHT: 102 LBS | BODY MASS INDEX: 18.77 KG/M2 | OXYGEN SATURATION: 96 % | SYSTOLIC BLOOD PRESSURE: 118 MMHG | RESPIRATION RATE: 17 BRPM

## 2025-04-24 DIAGNOSIS — K55.1 SUPERIOR MESENTERIC ARTERY STENOSIS (HCC): ICD-10-CM

## 2025-04-24 DIAGNOSIS — E78.2 MIXED DYSLIPIDEMIA: Chronic | ICD-10-CM

## 2025-04-24 DIAGNOSIS — Z00.00 ENCOUNTER FOR MEDICARE ANNUAL WELLNESS EXAM: ICD-10-CM

## 2025-04-24 DIAGNOSIS — Z12.31 BREAST CANCER SCREENING BY MAMMOGRAM: ICD-10-CM

## 2025-04-24 DIAGNOSIS — N95.1 POST MENOPAUSAL SYNDROME: Chronic | ICD-10-CM

## 2025-04-24 DIAGNOSIS — R73.09 ELEVATED HEMOGLOBIN A1C: ICD-10-CM

## 2025-04-24 DIAGNOSIS — K58.1 IRRITABLE BOWEL SYNDROME WITH CONSTIPATION: Chronic | ICD-10-CM

## 2025-04-24 DIAGNOSIS — R79.89 HIGH SERUM VITAMIN B12: ICD-10-CM

## 2025-04-24 DIAGNOSIS — D12.6 ADENOMATOUS POLYP OF COLON, UNSPECIFIED PART OF COLON: ICD-10-CM

## 2025-04-24 DIAGNOSIS — K57.30 DIVERTICULOSIS OF LARGE INTESTINE WITHOUT HEMORRHAGE: ICD-10-CM

## 2025-04-24 DIAGNOSIS — I77.4 CELIAC ARTERY STENOSIS (HCC): ICD-10-CM

## 2025-04-24 DIAGNOSIS — F51.01 PRIMARY INSOMNIA: Chronic | ICD-10-CM

## 2025-04-24 DIAGNOSIS — E55.9 VITAMIN D DEFICIENCY: Chronic | ICD-10-CM

## 2025-04-24 DIAGNOSIS — G89.29 OTHER CHRONIC PAIN: Chronic | ICD-10-CM

## 2025-04-24 DIAGNOSIS — Z71.85 VACCINE COUNSELING: ICD-10-CM

## 2025-04-24 DIAGNOSIS — F11.20 CHRONIC NARCOTIC DEPENDENCE (HCC): Chronic | ICD-10-CM

## 2025-04-24 PROBLEM — Z91.81 RISK FOR FALLS: Status: RESOLVED | Noted: 2024-02-09 | Resolved: 2025-04-24

## 2025-04-24 RX ORDER — ZOLPIDEM TARTRATE 10 MG/1
10 TABLET ORAL NIGHTLY PRN
Qty: 90 TABLET | Refills: 0 | Status: SHIPPED | OUTPATIENT
Start: 2025-04-24 | End: 2025-07-23

## 2025-04-24 ASSESSMENT — ACTIVITIES OF DAILY LIVING (ADL): BATHING_REQUIRES_ASSISTANCE: 0

## 2025-04-24 ASSESSMENT — ENCOUNTER SYMPTOMS: GENERAL WELL-BEING: FAIR

## 2025-04-24 ASSESSMENT — FIBROSIS 4 INDEX: FIB4 SCORE: 1.4

## 2025-04-24 ASSESSMENT — PATIENT HEALTH QUESTIONNAIRE - PHQ9: CLINICAL INTERPRETATION OF PHQ2 SCORE: 0

## 2025-04-24 NOTE — PROGRESS NOTES
CC:   Medicare Annual Wellness Visit    HPI:  Adrianne is a 69 y.o. female here for her Medicare Annual Wellness Visit and to review labs done 4/14/25.     Patient Active Problem List    Diagnosis Date Noted    Elevated hemoglobin A1c 04/24/2025    Superior mesenteric artery stenosis (HCC) 08/18/2023    Celiac artery stenosis (HCC) 08/18/2023    Hand arthritis 08/18/2023    Mixed dyslipidemia 02/12/2018    Post menopausal syndrome 02/12/2018    Vitamin D deficiency 02/12/2018    Chronic right hip pain 05/21/2017    History of breast cancer 05/08/2017    Chronic narcotic dependence (HCC) 05/05/2017    Diverticulosis of large intestine without hemorrhage, per 12/23/16 COLONOSCOPY 01/14/2017    Adenomatous polyp of colon, per 12/23/16 COLONOSCOPY 01/14/2017    Atherosclerosis of right carotid artery, mild 12/15/2016    History of opthalmic migraine 07/12/2016    Irritable bowel syndrome (IBS) 01/19/2016    Family history of colon cancer     Chronic pain 08/13/2013    Primary insomnia     Chronic pelvic pain in female      Current Outpatient Medications   Medication Sig Dispense Refill    zolpidem (AMBIEN) 10 MG Tab Take 1 Tablet by mouth at bedtime as needed for Sleep for up to 90 days. 90 Tablet 0    NALTREXONE HCL PO Take  by mouth.      estradiol (ESTRACE) 0.1 MG/GM vaginal cream Insert 1 g into the vagina two times a week. 42.5 g 3    zolpidem (AMBIEN) 10 MG Tab Take 10 mg by mouth at bedtime as needed for Sleep.      CALCIUM PO Take 300 mg by mouth every morning.      Cyanocobalamin (B-12) 5000 MCG Cap Take 5,000 mcg by mouth every morning.      DULoxetine (CYMBALTA) 60 MG Cap DR Particles delayed-release capsule Take 60 mg by mouth at bedtime.      TURMERIC PO Take 1,000 mg by mouth every morning.      Coenzyme Q10 (COQ10) 100 MG Cap Take 100 mg by mouth every morning.      pregabalin (LYRICA) 25 MG Cap Take 50 mg by mouth at bedtime. 2 to 3 capsules = 50 to 75 mg.      Ascorbic Acid (VITAMIN C) 1000 MG Tab Take  1,000 mg by mouth every evening.      Omega-3 Fatty Acids (FISH OIL) 1000 MG Cap capsule Take 1,000 mg by mouth every morning.      tizanidine (ZANAFLEX) 4 MG Tab Take 4 mg by mouth at bedtime. Indications: Musculoskeletal Pain       No current facility-administered medications for this visit.      Current supplements: see MAR   Chronic narcotic pain medicines: not currently  Allergies: Meperidine, Oxycontin [oxycodone hcl], Statins [hmg-coa-r inhibitors], Cherry, Latex, and Nsaids  Exercise: yes  Current social contact/activities: yes  Current mood: good  Advance Directive on file: no    Screening:  Depression Screening  Little interest or pleasure in doing things?  0 - not at all  Feeling down, depressed , or hopeless? 0 - not at all  Patient Health Questionnaire Score: 0     If depressive symptoms identified deferred to follow up visit unless specifically addressed in assessment and plan.    Interpretation of PHQ-9 Total Score   Score Severity   1-4 No Depression   5-9 Mild Depression   10-14 Moderate Depression   15-19 Moderately Severe Depression   20-27 Severe Depression    Screening for Cognitive Impairment  Do you or any of your friends or family members have any concern about your memory? No  Three Minute Recall (Village, Kitchen, Baby) 3/3    [place]on clock face with all 12 numbers and set the hands to show 10 minutes past 11.  Yes    Cognitive concerns identified deferred for follow up unless specifically addressed in assessment and plan.    Fall Risk Assessment  Has the patient had two or more falls in the last year or any fall with injury in the last year?  No    Safety Assessment  Do you always wear your seatbelt?  Yes  Any changes to home needed to function safely? No  Difficulty hearing.  No  Patient counseled about all safety risks that were identified.    Functional Assessment ADLs  Are there any barriers preventing you from cooking for yourself or meeting nutritional needs?  No.    Are there any  barriers preventing you from driving safely or obtaining transportation?  No.    Are there any barriers preventing you from using a telephone or calling for help?  No    Are there any barriers preventing you from shopping?  No.    Are there any barriers preventing you from taking care of your own finances?  No    Are there any barriers preventing you from managing your medications?  No    Are there any barriers preventing you from showering, bathing or dressing yourself? No    Are there any barriers preventing you from doing housework or laundry? No  Are there any barriers preventing you from using the toilet?No  Are you currently engaging in any exercise or physical activity?  Yes.      Self-Assessment of Health  What is your perception of your health? Fair    Do you sleep more than six hours a night? No    In the past 7 days, how much did pain keep you from doing your normal work? None    Do you spend quality time with family or friends (virtually or in person)? Yes    Do you usually eat a heart healthy diet that constists of a variety of fruits, vegetables, whole grains and fiber? Yes    Do you eat foods high in fat and/or Fast Food more than three times per week? No    How concerned are you that your medical conditions are not being well managed? Not at all    Are you worried that in the next 2 months, you may not have stable housing that you own, rent, or stay in as part of a household?        Advance Care Planning  Do you have an Advance Directive, Living Will, Durable Power of , or POLST?                   Health Maintenance Summary            Current Care Gaps       Colorectal Cancer Screening (Colonoscopy - Every 5 Years) Overdue since 12/23/2021 08/18/2023  Order placed for Referral to GI for Colonoscopy by Danielle Blas M.D.    12/23/2016  REFERRAL TO GI FOR COLONOSCOPY              COVID-19 Vaccine (6 - 2024-25 season) Overdue since 9/1/2024      05/10/2023  Imm Admin: PFIZER BIVALENT  SARS-COV-2 VACCINE (12+)    10/20/2022  Imm Admin: MODERNA BIVALENT BOOSTER SARS-COV-2 VACCINE (6+)    04/24/2022  Imm Admin: PFIZER PURPLE CAP SARS-COV-2 VACCINATION (12+)    11/16/2021  Imm Admin: PFIZER PURPLE CAP SARS-COV-2 VACCINATION (12+)    04/23/2021  Imm Admin: PFIZER PURPLE CAP SARS-COV-2 VACCINATION (12+)     Only the first 5 history entries have been loaded, but more history exists.                    Awaiting Completion       Mammogram (Yearly) Order placed this encounter      04/24/2025  Order placed for MA-SCREENING MAMMO BILAT W/TOMOSYNTHESIS W/CAD by Danielle Blas M.D.    06/10/2024  MA-SCREENING MAMMO BILAT W/TOMOSYNTHESIS W/CAD    06/08/2023  MA-SCREENING MAMMO BILAT W/TOMOSYNTHESIS W/CAD    08/30/2021  MA-SCREENING MAMMO BILAT W/TOMOSYNTHESIS W/CAD    07/17/2020  MA-SCREENING MAMMO BILAT W/TOMOSYNTHESIS W/CAD      Only the first 5 history entries have been loaded, but more history exists.                      Upcoming       Annual Wellness Visit (Yearly) Next due on 4/24/2026 04/24/2025  Subsequent Annual Wellness Visit - Includes PPPS ()    04/24/2025  Visit Dx: Encounter for Medicare annual wellness exam    02/09/2024  Subsequent Annual Wellness Visit - Includes PPPS ()    02/09/2024  Visit Dx: Encounter for Medicare annual wellness exam              Bone Density Scan (Every 5 Years) Next due on 4/27/2026 04/27/2021  DS-BONE DENSITY STUDY (DEXA)    03/28/2019  DS-BONE DENSITY STUDY (DEXA)    03/20/2017  DS-BONE DENSITY STUDY (DEXA)    09/14/2009  DS-BONE DENSITY STUDY (DEXA)              IMM DTaP/Tdap/Td Vaccine (3 - Td or Tdap) Next due on 9/24/2029 09/24/2019  Imm Admin: Tdap Vaccine    10/29/2013  Imm Admin: Tdap Vaccine                      Completed or No Longer Recommended       Influenza Vaccine (Series Information) Completed      08/08/2024  Imm Admin: Influenza Vaccine Adult HD    09/25/2023  Imm Admin: Influenza Vaccine Adult HD    09/01/2023  Imm Admin:  Influenza Seasonal Injectable - Historical Data    09/29/2022  Imm Admin: Influenza Vaccine Adult HD    11/16/2021  Imm Admin: Influenza Vaccine Quad Inj (Pf)      Only the first 5 history entries have been loaded, but more history exists.              Zoster (Shingles) Vaccines (Series Information) Completed      12/03/2019  Imm Admin: Zoster Vaccine Recombinant (RZV) (SHINGRIX)    09/20/2019  Imm Admin: Zoster Vaccine Recombinant (RZV) (SHINGRIX)    07/31/2015  Imm Admin: Zoster Vaccine Live (ZVL) (Zostavax) - HISTORICAL DATA              Pneumococcal Vaccine: 50+ Years (Series Information) Completed      10/31/2022  Imm Admin: Pneumococcal Conjugate Vaccine (PCV20)    10/01/2016  Imm Admin: Pneumococcal Vaccine (PCV7) - HISTORICAL DATA              Hepatitis C Screening  Addressed      06/11/2019  Reason not specified    06/11/2019  Hepatitis C Antibody component of HEP C VIRUS ANTIBODY              Hepatitis A Vaccine (Hep A) (Series Information) Aged Out      05/12/2021  Imm Admin: Hep A/HEP B Combined Vaccine (TwinRix)    09/20/2019  Imm Admin: Hep A/HEP B Combined Vaccine (TwinRix)    02/08/2016  Imm Admin: Hep A/HEP B Combined Vaccine (TwinRix)              HPV Vaccines (Series Information) Aged Out      No completion history exists for this topic.              Polio Vaccine (Inactivated Polio) (Series Information) Aged Out     No completion history exists for this topic.              Meningococcal Immunization (Series Information) Aged Out     No completion history exists for this topic.              Cervical Cancer Screening  Discontinued        Frequency changed to Never automatically (Topic No Longer Applies)    10/03/2011  Done              Hepatitis B Vaccine (Hep B)  Discontinued      05/12/2021  Imm Admin: Hep A/HEP B Combined Vaccine (TwinRix)    09/20/2019  Imm Admin: Hep A/HEP B Combined Vaccine (TwinRix)    02/08/2016  Imm Admin: Hep A/HEP B Combined Vaccine (TwinRix)                             Patient Care Team:  Danielle Blas M.D. as PCP - General (Family Medicine)  Fariba Dutta M.D. (Medical Oncology)  Danielle Blas M.D. (Family Medicine)  Melodie Andre R.N.      Social History     Tobacco Use    Smoking status: Never     Passive exposure: Never    Smokeless tobacco: Never   Substance Use Topics    Alcohol use: Yes     Alcohol/week: 0.6 oz     Types: 1 Glasses of wine per week     Comment: 1-3 per week     Drug use: No     Family History   Problem Relation Age of Onset    Cancer Mother         breast cancer    Cancer Father         colon, throat, and skin     Heart Attack Brother 50     She  has a past medical history of Anesthesia, Bowel habit changes, Breast cancer (HCC), Cancer (HCC) (2017), Cancer (HCC), Cataract, Chronic pain, Chronic pelvic pain in female, Colon polyp, ENDOMETRIOSIS, Endometriosis, Family history of colon cancer, Gynecological disorder, Heart burn, Hepatitis, Hyperlipidemia, Indigestion, Insomnia, Jaundice (1967 ), Pain, Pain, Pneumonia (2006), Postmenopausal HRT (hormone replacement therapy), Psychiatric problem, Recurrent UTI, Seizure (HCC), Snoring, and Urine frequency.   Past Surgical History:   Procedure Laterality Date    BREAST BIOPSY Left 2/16/2017    Procedure: BREAST BIOPSY-RE EXCISION FOR MARGINS ;  Surgeon: Adriano Baird M.D.;  Location: [facility];  Service:     MASTECTOMY Left 2/8/2017    Procedure: MASTECTOMY - PARTIAL PLACEMENT OF CAVITY EVALUATION DEVICE;  Surgeon: Adriano Baird M.D.;  Location: [facility];  Service:     NODE BIOPSY SENTINEL  2/8/2017    Procedure: NODE BIOPSY SENTINEL;  Surgeon: Adriano Baird M.D.;  Location: [facility];  Service:     LUMPECTOMY Left Feb 2017    LUMPECTOMY  2007    Left - benign    ABDOMINAL HYSTERECTOMY TOTAL  1999    for endometriosis    OVARIAN CYSTECTOMY  1985    OTHER ORTHOPEDIC SURGERY  1964    ORIF L elbow compound fx    BREAST BIOPSY      left breast  "   BREAST BIOPSY      left breast biopsy 2008 (benign)    CATARACT EXTRACTION WITH IOL Bilateral     LAPAROSCOPY  [id number]    multiple for endometriosis       ROS:    All positives noted in HPI. All others reviewed and are negative.    Ostomy or other tubes or amputations: no  Chronic oxygen use: no  Last eye exam: UTD  : denies urinary incontinence; does not interfere with ADLs/ sleep  Gait: stable  Problems with balance/ difficulty walking: not currently  Hearing: good  Dentition: adequate    Lab results 4/14/25 reviewed with patient at visit today.     Exam:   /66 (BP Location: Left arm, Patient Position: Sitting, BP Cuff Size: Adult)   Pulse 83   Temp 36.6 °C (97.8 °F) (Temporal)   Resp 17   Ht 1.575 m (5' 2\")   Wt 46.3 kg (102 lb)   SpO2 96%  Body mass index is 18.66 kg/m².    Gen: Well developed; thin; no acute distress; age appropriate appearance   HEENT: Normocephalic; atraumatic; PEERLA b/l; sclera clear b/l; b/l external auditory canals WNL; b/l TM WNL; nares patent; oropharynx clear; oral mucosa moist; tongue midline; dentition adequate   Neck: No adenopathy; no thyromegaly  CV: Regular rate and rhythm; S1/ S2 present; no murmur, gallop or rub noted  Pulm: No respiratory distress; clear to ascultation b/l; no wheezing or stridor noted b/l  Abd: Adequate bowel sounds noted; soft and nontender; no rebound, rigidity, nor distention  Extremities: No peripheral edema b/l LE extremities/ no clubbing nor cyanosis noted  Skin: Warm and dry; no rashes noted   Neuro: No focal deficits noted; pt is able to get up out of chair unassisted and walk forward  Psych: AAOx4; mood and affect are appropriate    Assessment and Plan:  1. Mixed dyslipidemia  Uncontrolled historically with history of statin intolerance. CT Cardiac Score:1.4 2/27/24 and I have referred her to Dr. Bloch in the past for additional treatment options/management but she did not establish care with him. Recommend repeating lipid panel in " 4-6 months for ongoing management.   - Lipid Profile; Future  - Subsequent Annual Wellness Visit - Includes PPPS ()    2. Diverticulosis of large intestine without hemorrhage, per 12/23/16 COLONOSCOPY  Patient has screening colonoscopy scheduled in June.   - Subsequent Annual Wellness Visit - Includes PPPS ()    3. Adenomatous polyp of colon, unspecified part of colon  Patient has screening colonoscopy scheduled in June.   - Subsequent Annual Wellness Visit - Includes PPPS ()    4. Superior mesenteric artery stenosis (HCC)  Known condition managed historically by Dr. Badillo  - Subsequent Annual Wellness Visit - Includes PPPS ()    5. Celiac artery stenosis (HCC)  Known condition managed historically by Dr. Badillo  - Subsequent Annual Wellness Visit - Includes PPPS ()    6. Chronic narcotic dependence (HCC)  Patient is not currently taking narcotic medication  - Subsequent Annual Wellness Visit - Includes PPPS ()    7. Other chronic pain  Chronic condition managed by pain management team   - Subsequent Annual Wellness Visit - Includes PPPS ()    8. Irritable bowel syndrome with constipation  Stable  - Subsequent Annual Wellness Visit - Includes PPPS ()    9. Post menopausal syndrome  Stable  - Subsequent Annual Wellness Visit - Includes PPPS ()    10. Primary insomnia  Stable/ patient can continue PRN Ambien use for insomnia management. She continues to benefit from medication use, denies medication related side effects, has failed non controlled alternatives, and uses this controlled medication in a safe manner.  reviewed today and no concerns noted. Pt is well versed in safe use of controlled medication, and benefit of use outweighs risk at this time. New RX sent to pharmacy.   - zolpidem (AMBIEN) 10 MG Tab; Take 1 Tablet by mouth at bedtime as needed for Sleep for up to 90 days.  Dispense: 90 Tablet; Refill: 0  - Subsequent Annual Wellness Visit - Includes PPPS  ()    11. Vitamin D deficiency  Stable but I recommend patient start Vitamin D3 1000 IU daily use for maintenance.   - Subsequent Annual Wellness Visit - Includes PPPS ()    12. High serum vitamin B12  B12 level is far higher than normal limits due to ongoing supplementation. Recommend patient decrease B12 supplementation moving forward.   - Subsequent Annual Wellness Visit - Includes PPPS ()    13. Breast cancer screening by mammogram  Patient will be due for annual mammogram in June, and she will make imaging appointment accordingly.   - MA-SCREENING MAMMO BILAT W/TOMOSYNTHESIS W/CAD; Future  - Subsequent Annual Wellness Visit - Includes PPPS ()    14. Elevated hemoglobin A1c  Uncontrolled - HgA1c is now 6.3% (previously 5.6%) and strategies for management discussed at visit today. Recommend repeating HgA1c in 4-6 months for ongoing surveillance.   - HEMOGLOBIN A1C; Future  - Subsequent Annual Wellness Visit - Includes PPPS ()    15. Vaccine counseling  Patient educated about vaccine eligibility at visit today.   - Subsequent Annual Wellness Visit - Includes PPPS ()    16. Encounter for Medicare annual wellness exam  Patient remains well informed about medical conditions that need additional attention moving forward.   - Subsequent Annual Wellness Visit - Includes PPPS ()       Services needed: no new services needed at this time  Health Care Screening: recommendations as per orders if indicated.  Referrals offered: none  Counseling provided today:  Prevent falls and reduce trip hazards; Secure or remove rugs if present   Maintain working fire alarm and carbon monoxide detectors   Engage in regular physical activity daily and social activities weekly as tolerated   F/U with me quarterly/ PRN sooner as new needs arise

## 2025-05-29 ENCOUNTER — APPOINTMENT (OUTPATIENT)
Dept: URBAN - METROPOLITAN AREA CLINIC 20 | Facility: CLINIC | Age: 70
Setting detail: DERMATOLOGY
End: 2025-05-29

## 2025-05-29 DIAGNOSIS — Z41.9 ENCOUNTER FOR PROCEDURE FOR PURPOSES OTHER THAN REMEDYING HEALTH STATE, UNSPECIFIED: ICD-10-CM

## 2025-05-29 PROCEDURE — ? FRAXEL

## 2025-05-29 ASSESSMENT — LOCATION SIMPLE DESCRIPTION DERM
LOCATION SIMPLE: LEFT FOREHEAD
LOCATION SIMPLE: LEFT CHEEK
LOCATION SIMPLE: RIGHT CHEEK

## 2025-05-29 ASSESSMENT — LOCATION ZONE DERM: LOCATION ZONE: FACE

## 2025-06-24 ENCOUNTER — HOSPITAL ENCOUNTER (OUTPATIENT)
Dept: RADIOLOGY | Facility: MEDICAL CENTER | Age: 70
End: 2025-06-24
Attending: FAMILY MEDICINE
Payer: MEDICARE

## 2025-06-24 DIAGNOSIS — Z12.31 BREAST CANCER SCREENING BY MAMMOGRAM: ICD-10-CM

## 2025-06-24 PROCEDURE — 77063 BREAST TOMOSYNTHESIS BI: CPT

## 2025-06-25 ENCOUNTER — APPOINTMENT (OUTPATIENT)
Dept: URBAN - METROPOLITAN AREA CLINIC 20 | Facility: CLINIC | Age: 70
Setting detail: DERMATOLOGY
End: 2025-06-25

## 2025-06-25 ENCOUNTER — RX ONLY (RX ONLY)
Age: 70
End: 2025-06-25

## 2025-06-25 DIAGNOSIS — Z41.9 ENCOUNTER FOR PROCEDURE FOR PURPOSES OTHER THAN REMEDYING HEALTH STATE, UNSPECIFIED: ICD-10-CM

## 2025-06-25 PROCEDURE — ? RF MICRONEEDLING

## 2025-06-25 RX ORDER — HYDROQUINONE 6 %
SMALL AMOUNT EMULSION (GRAM) TOPICAL ONCE A DAY
Qty: 30 | Refills: 2 | Status: ERX

## 2025-06-25 ASSESSMENT — LOCATION SIMPLE DESCRIPTION DERM
LOCATION SIMPLE: RIGHT CHEEK
LOCATION SIMPLE: LEFT LIP
LOCATION SIMPLE: LEFT CHEEK
LOCATION SIMPLE: RIGHT FOREHEAD

## 2025-06-25 ASSESSMENT — LOCATION DETAILED DESCRIPTION DERM
LOCATION DETAILED: RIGHT MEDIAL FOREHEAD
LOCATION DETAILED: RIGHT CENTRAL MALAR CHEEK
LOCATION DETAILED: LEFT LOWER CUTANEOUS LIP
LOCATION DETAILED: LEFT INFERIOR CENTRAL MALAR CHEEK

## 2025-06-25 ASSESSMENT — LOCATION ZONE DERM
LOCATION ZONE: LIP
LOCATION ZONE: FACE

## 2025-06-25 NOTE — PROCEDURE: RF MICRONEEDLING
Depth In Mm: 0.1
Depth In Mm: 1
Indication: dyschromia
Detail Level: Simple
Depth In Mm: 2
Indication: skin tightening and resurfacing
Depth In Mm: 1.5
Treatment Number (Optional): 0
Laser: Endymed Intensif
Fluence In J/Cm2 (Optional): 60%
Price (Use Numbers Only, No Special Characters Or $): 776
Laser: Noemy [id number]
Information: You must select either a Location or Location Override for the laser information to render in the note
Laser Override (Optional): Exion
Consent: Written consent obtained, risks reviewed including but not limited to pain, scarring, infection and incomplete improvement.  Patient understands the procedure is cosmetic in nature and will require out of pocket payment.
Pre-Procedure Text: The treatment areas were cleansed in the usual fashion and treatment proceeded as outlined above.
Location #4: Nose
Topical Anesthesia Type: 23% lidocaine, 7% tetracaine
Location #3: Chin
Post-Care Instructions: After the procedure, take precautions agains sun exposure. Do not apply sunscreen for 12 hours after the procedure. Do not apply make-up for 12 hours after the procedure. Avoid alcohol based toners for 10-14 days. After 2-3 days patients can return to their regular skin regimen.
Location #2: Cheeks
Location #1: Forehead

## 2025-06-26 ENCOUNTER — RESULTS FOLLOW-UP (OUTPATIENT)
Dept: INTERNAL MEDICINE | Facility: IMAGING CENTER | Age: 70
End: 2025-06-26

## 2025-08-22 ENCOUNTER — HOSPITAL ENCOUNTER (OUTPATIENT)
Facility: MEDICAL CENTER | Age: 70
End: 2025-08-22
Attending: FAMILY MEDICINE
Payer: MEDICARE

## 2025-08-22 ENCOUNTER — OFFICE VISIT (OUTPATIENT)
Dept: INTERNAL MEDICINE | Facility: IMAGING CENTER | Age: 70
End: 2025-08-22
Payer: MEDICARE

## 2025-08-22 VITALS
RESPIRATION RATE: 17 BRPM | TEMPERATURE: 98.7 F | WEIGHT: 103 LBS | SYSTOLIC BLOOD PRESSURE: 148 MMHG | HEART RATE: 86 BPM | DIASTOLIC BLOOD PRESSURE: 74 MMHG | OXYGEN SATURATION: 94 % | HEIGHT: 62 IN | BODY MASS INDEX: 18.95 KG/M2

## 2025-08-22 DIAGNOSIS — Z20.822 ENCOUNTER FOR LABORATORY TESTING FOR COVID-19 VIRUS: ICD-10-CM

## 2025-08-22 DIAGNOSIS — R03.0 ELEVATED BLOOD PRESSURE READING WITHOUT DIAGNOSIS OF HYPERTENSION: ICD-10-CM

## 2025-08-22 DIAGNOSIS — Z78.9 ACUTE MEDICAL ILLNESS: Primary | ICD-10-CM

## 2025-08-22 DIAGNOSIS — J06.9 UPPER RESPIRATORY TRACT INFECTION, UNSPECIFIED TYPE: ICD-10-CM

## 2025-08-22 DIAGNOSIS — R68.89 FLU-LIKE SYMPTOMS: ICD-10-CM

## 2025-08-22 LAB
FLUAV RNA SPEC QL NAA+PROBE: NEGATIVE
FLUBV RNA SPEC QL NAA+PROBE: NEGATIVE
RSV RNA SPEC QL NAA+PROBE: NEGATIVE
SARS-COV-2 RNA RESP QL NAA+PROBE: NEGATIVE

## 2025-08-22 PROCEDURE — 85025 COMPLETE CBC W/AUTO DIFF WBC: CPT

## 2025-08-22 PROCEDURE — 87637 SARSCOV2&INF A&B&RSV AMP PRB: CPT | Performed by: FAMILY MEDICINE

## 2025-08-22 PROCEDURE — 3078F DIAST BP <80 MM HG: CPT | Performed by: FAMILY MEDICINE

## 2025-08-22 PROCEDURE — 3077F SYST BP >= 140 MM HG: CPT | Performed by: FAMILY MEDICINE

## 2025-08-22 PROCEDURE — 80053 COMPREHEN METABOLIC PANEL: CPT

## 2025-08-22 PROCEDURE — 99214 OFFICE O/P EST MOD 30 MIN: CPT | Performed by: FAMILY MEDICINE

## 2025-08-22 ASSESSMENT — FIBROSIS 4 INDEX: FIB4 SCORE: 1.42

## 2025-08-23 LAB
ALBUMIN SERPL BCP-MCNC: 4.6 G/DL (ref 3.2–4.9)
ALBUMIN/GLOB SERPL: 1.8 G/DL
ALP SERPL-CCNC: 87 U/L (ref 30–99)
ALT SERPL-CCNC: 8 U/L (ref 2–50)
ANION GAP SERPL CALC-SCNC: 13 MMOL/L (ref 7–16)
AST SERPL-CCNC: 18 U/L (ref 12–45)
BASOPHILS # BLD AUTO: 1.1 % (ref 0–1.8)
BASOPHILS # BLD: 0.07 K/UL (ref 0–0.12)
BILIRUB SERPL-MCNC: 0.3 MG/DL (ref 0.1–1.5)
BUN SERPL-MCNC: 15 MG/DL (ref 8–22)
CALCIUM ALBUM COR SERPL-MCNC: 8.7 MG/DL (ref 8.5–10.5)
CALCIUM SERPL-MCNC: 9.2 MG/DL (ref 8.5–10.5)
CHLORIDE SERPL-SCNC: 105 MMOL/L (ref 96–112)
CO2 SERPL-SCNC: 24 MMOL/L (ref 20–33)
CREAT SERPL-MCNC: 0.72 MG/DL (ref 0.5–1.4)
EOSINOPHIL # BLD AUTO: 0.36 K/UL (ref 0–0.51)
EOSINOPHIL NFR BLD: 5.8 % (ref 0–6.9)
ERYTHROCYTE [DISTWIDTH] IN BLOOD BY AUTOMATED COUNT: 43.9 FL (ref 35.9–50)
GFR SERPLBLD CREATININE-BSD FMLA CKD-EPI: 91 ML/MIN/1.73 M 2
GLOBULIN SER CALC-MCNC: 2.5 G/DL (ref 1.9–3.5)
GLUCOSE SERPL-MCNC: 109 MG/DL (ref 65–99)
HCT VFR BLD AUTO: 41.8 % (ref 37–47)
HGB BLD-MCNC: 13.3 G/DL (ref 12–16)
IMM GRANULOCYTES # BLD AUTO: 0.01 K/UL (ref 0–0.11)
IMM GRANULOCYTES NFR BLD AUTO: 0.2 % (ref 0–0.9)
LYMPHOCYTES # BLD AUTO: 1.42 K/UL (ref 1–4.8)
LYMPHOCYTES NFR BLD: 23 % (ref 22–41)
MCH RBC QN AUTO: 30 PG (ref 27–33)
MCHC RBC AUTO-ENTMCNC: 31.8 G/DL (ref 32.2–35.5)
MCV RBC AUTO: 94.4 FL (ref 81.4–97.8)
MONOCYTES # BLD AUTO: 0.48 K/UL (ref 0–0.85)
MONOCYTES NFR BLD AUTO: 7.8 % (ref 0–13.4)
NEUTROPHILS # BLD AUTO: 3.84 K/UL (ref 1.82–7.42)
NEUTROPHILS NFR BLD: 62.1 % (ref 44–72)
NRBC # BLD AUTO: 0 K/UL
NRBC BLD-RTO: 0 /100 WBC (ref 0–0.2)
PLATELET # BLD AUTO: 304 K/UL (ref 164–446)
PMV BLD AUTO: 11.2 FL (ref 9–12.9)
POTASSIUM SERPL-SCNC: 4.3 MMOL/L (ref 3.6–5.5)
PROT SERPL-MCNC: 7.1 G/DL (ref 6–8.2)
RBC # BLD AUTO: 4.43 M/UL (ref 4.2–5.4)
SODIUM SERPL-SCNC: 142 MMOL/L (ref 135–145)
WBC # BLD AUTO: 6.2 K/UL (ref 4.8–10.8)

## 2025-08-25 ENCOUNTER — OFFICE VISIT (OUTPATIENT)
Dept: INTERNAL MEDICINE | Facility: IMAGING CENTER | Age: 70
End: 2025-08-25
Payer: MEDICARE

## 2025-08-25 VITALS
DIASTOLIC BLOOD PRESSURE: 62 MMHG | HEIGHT: 62 IN | WEIGHT: 103 LBS | OXYGEN SATURATION: 94 % | TEMPERATURE: 97.9 F | HEART RATE: 81 BPM | SYSTOLIC BLOOD PRESSURE: 120 MMHG | BODY MASS INDEX: 18.95 KG/M2 | RESPIRATION RATE: 17 BRPM

## 2025-08-25 DIAGNOSIS — H60.392 OTHER INFECTIVE ACUTE OTITIS EXTERNA OF LEFT EAR: Primary | ICD-10-CM

## 2025-08-25 PROCEDURE — 99213 OFFICE O/P EST LOW 20 MIN: CPT | Performed by: FAMILY MEDICINE

## 2025-08-25 PROCEDURE — 3078F DIAST BP <80 MM HG: CPT | Performed by: FAMILY MEDICINE

## 2025-08-25 PROCEDURE — 3074F SYST BP LT 130 MM HG: CPT | Performed by: FAMILY MEDICINE

## 2025-08-25 ASSESSMENT — FIBROSIS 4 INDEX: FIB4 SCORE: 1.42

## (undated) DEVICE — RESERVOIR SUCTION 100 CC - SILICONE (20EA/CA)

## (undated) DEVICE — PROTECTOR ULNA NERVE - (36PR/CA)

## (undated) DEVICE — SODIUM CHL IRRIGATION 0.9% 1000ML (12EA/CA)

## (undated) DEVICE — ELECTRODE 850 FOAM ADHESIVE - HYDROGEL RADIOTRNSPRNT (50/PK)

## (undated) DEVICE — NEPTUNE 4 PORT MANIFOLD - (20/PK)

## (undated) DEVICE — COVER PROBE STERILE CONE (12EA/CA)

## (undated) DEVICE — MASK AIRWAY SIZE 3 UNIQUE SILICON (10/BX)

## (undated) DEVICE — SLEEVE, VASO, THIGH, MED

## (undated) DEVICE — SUTURE GENERAL

## (undated) DEVICE — SHEET TRANSVERSE LAP - (12EA/CA)

## (undated) DEVICE — SUTURE 3-0 VICRYL PLUS SH - 8X 18 INCH (12/BX)

## (undated) DEVICE — NEEDLE NON SAFETY HYPO 22 GA X 1 1/2 IN (100/BX)

## (undated) DEVICE — TUBE CONNECT SUCTION CLEAR 120 X 1/4" (50EA/CA)"

## (undated) DEVICE — CANISTER SUCTION 3000ML MECHANICAL FILTER AUTO SHUTOFF MEDI-VAC NONSTERILE LF DISP  (40EA/CA)

## (undated) DEVICE — Device

## (undated) DEVICE — CLIP SM INTNL HRZN TI ESCP LGT - (24EA/PK 25PK/BX)

## (undated) DEVICE — SET LEADWIRE 5 LEAD BEDSIDE DISPOSABLE ECG (1SET OF 5/EA)

## (undated) DEVICE — MASK ANESTHESIA ADULT  - (100/CA)

## (undated) DEVICE — CHLORAPREP 26 ML APPLICATOR - ORANGE TINT(25/CA)

## (undated) DEVICE — HEAD HOLDER JUNIOR/ADULT

## (undated) DEVICE — GLOVE BIOGEL SZ 8 SURGICAL PF LTX - (50PR/BX 4BX/CA)

## (undated) DEVICE — MEDICINE CUP STERILE 2 OZ - (100/CA)

## (undated) DEVICE — TUBING CLEARLINK DUO-VENT - C-FLO (48EA/CA)

## (undated) DEVICE — LEAD SET 6 DISP. EKG NIHON KOHDEN

## (undated) DEVICE — CONTAINER SPECIMEN BAG OR - STERILE 4 OZ W/LID (100EA/CA)

## (undated) DEVICE — SUTURE 3-0 VICRYL PLUS SH - 27 INCH (36/BX)

## (undated) DEVICE — PACK MAJOR BASIN - (2EA/CA)

## (undated) DEVICE — BLADE SURGICAL #15 - (50/BX 3BX/CA)

## (undated) DEVICE — SET EXTENSION WITH 2 PORTS (48EA/CA) ***PART #2C8610 IS A SUBSTITUTE*****

## (undated) DEVICE — DRAIN J-VAC 7MM FLAT - (10EA/CA)

## (undated) DEVICE — MASK, LARYNGEAL AIRWAY #4

## (undated) DEVICE — GLOVE BIOGEL PI ORTHO SZ 8.5 PF LF (40/BX)

## (undated) DEVICE — STERI STRIP COMPOUND BENZOIN - TINCTURE 0.6ML WITH APPLICATOR (40EA/BX)

## (undated) DEVICE — GLOVE SZ 7.5 BIOGEL PI MICRO - PF LF (50PR/BX)

## (undated) DEVICE — CLOSURE SKIN STRIP 1/2 X 4 IN - (STERI STRIP) (50/BX 4BX/CA)

## (undated) DEVICE — SENSOR SPO2 NEO LNCS ADHESIVE (20/BX) SEE USER NOTES

## (undated) DEVICE — GLOVE BIOGEL PI INDICATOR SZ 8.0 SURGICAL PF LF -(50/BX 4BX/CA)

## (undated) DEVICE — KIT ROOM DECONTAMINATION

## (undated) DEVICE — SPONGE GAUZESTER 4 X 4 4PLY - (128PK/CA)

## (undated) DEVICE — ELECTRODE DUAL RETURN W/ CORD - (50/PK)

## (undated) DEVICE — CLIP MED INTNL HRZN TI ESCP - (25/BX)

## (undated) DEVICE — GLOVE BIOGEL PI INDICATOR SZ 6.5 SURGICAL PF LF - (50/BX 4BX/CA)

## (undated) DEVICE — LACTATED RINGERS INJ 1000 ML - (14EA/CA 60CA/PF)

## (undated) DEVICE — SUTURE 2-0 ETHILON FS - (36/BX) 18 INCH

## (undated) DEVICE — TOWELS CLOTH SURGICAL - (4/PK 20PK/CA)

## (undated) DEVICE — GOWN WARMING STANDARD FLEX - (30/CA)

## (undated) DEVICE — COVER LIGHT HANDLE FLEXIBLE - SOFT (2EA/PK 80PK/CA)

## (undated) DEVICE — GLOVE BIOGEL SZ 7 SURGICAL PF LTX - (50PR/BX 4BX/CA)

## (undated) DEVICE — KIT ANESTHESIA W/CIRCUIT & 3/LT BAG W/FILTER (20EA/CA)

## (undated) DEVICE — SUTURE 4-0 MONOCRYL PLUS PS-2 - 27 INCH (36/BX)

## (undated) DEVICE — GOWN SURGEONS X-LARGE - DISP. (30/CA)

## (undated) DEVICE — SYRINGE 10 ML CONTROL LL (25EA/BX 4BX/CA)

## (undated) DEVICE — DRAPE LARGE 3 QUARTER - (20/CA)

## (undated) DEVICE — SUCTION INSTRUMENT YANKAUER BULBOUS TIP W/O VENT (50EA/CA)

## (undated) DEVICE — GLOVE BIOGEL INDICATOR SZ 7.5 SURGICAL PF LTX - (50PR/BX 4BX/CA)

## (undated) DEVICE — BLANKET WARMING LOWER BODY - (10/CA) INACTIVE USE #8585

## (undated) DEVICE — GLOVE BIOGEL PI INDICATOR SZ 7.0 SURGICAL PF LF - (50/BX 4BX/CA)

## (undated) DEVICE — PACK MINOR BASIN - (2EA/CA)

## (undated) DEVICE — GLOVE SZ 7 BIOGEL PI MICRO - PF LF (50PR/BX 4BX/CA)